# Patient Record
Sex: FEMALE | Race: WHITE | Employment: UNEMPLOYED | URBAN - NONMETROPOLITAN AREA
[De-identification: names, ages, dates, MRNs, and addresses within clinical notes are randomized per-mention and may not be internally consistent; named-entity substitution may affect disease eponyms.]

---

## 2017-02-15 RX ORDER — DIAZEPAM 10 MG/1
10 TABLET ORAL EVERY 8 HOURS PRN
Qty: 90 TABLET | Refills: 0 | OUTPATIENT
Start: 2017-02-15

## 2017-02-23 ENCOUNTER — OFFICE VISIT (OUTPATIENT)
Dept: PSYCHIATRY | Age: 52
End: 2017-02-23
Payer: MEDICAID

## 2017-02-23 VITALS
BODY MASS INDEX: 26.45 KG/M2 | OXYGEN SATURATION: 100 % | SYSTOLIC BLOOD PRESSURE: 113 MMHG | HEIGHT: 63 IN | DIASTOLIC BLOOD PRESSURE: 77 MMHG | WEIGHT: 149.3 LBS | HEART RATE: 70 BPM

## 2017-02-23 DIAGNOSIS — F41.1 GENERALIZED ANXIETY DISORDER: ICD-10-CM

## 2017-02-23 DIAGNOSIS — F43.10 POST TRAUMATIC STRESS DISORDER: ICD-10-CM

## 2017-02-23 DIAGNOSIS — F32.A DEPRESSION, UNSPECIFIED DEPRESSION TYPE: Primary | ICD-10-CM

## 2017-02-23 DIAGNOSIS — F41.0 PANIC DISORDER: ICD-10-CM

## 2017-02-23 PROCEDURE — 99213 OFFICE O/P EST LOW 20 MIN: CPT | Performed by: NURSE PRACTITIONER

## 2017-02-23 RX ORDER — GABAPENTIN 300 MG/1
900 CAPSULE ORAL 3 TIMES DAILY
Qty: 270 CAPSULE | Refills: 2 | Status: SHIPPED | OUTPATIENT
Start: 2017-02-23 | End: 2017-06-21 | Stop reason: SDUPTHER

## 2017-02-23 RX ORDER — DIAZEPAM 10 MG/1
10 TABLET ORAL EVERY 8 HOURS PRN
Qty: 90 TABLET | Refills: 0 | Status: SHIPPED | OUTPATIENT
Start: 2017-02-23 | End: 2017-05-30 | Stop reason: SDUPTHER

## 2017-02-23 RX ORDER — DULOXETIN HYDROCHLORIDE 60 MG/1
60 CAPSULE, DELAYED RELEASE ORAL DAILY
Qty: 30 CAPSULE | Refills: 2 | Status: SHIPPED | OUTPATIENT
Start: 2017-02-23 | End: 2017-06-21 | Stop reason: SDUPTHER

## 2017-02-24 ENCOUNTER — TELEPHONE (OUTPATIENT)
Dept: PSYCHIATRY | Age: 52
End: 2017-02-24

## 2017-04-21 ENCOUNTER — TELEPHONE (OUTPATIENT)
Dept: PSYCHIATRY | Age: 52
End: 2017-04-21

## 2017-04-24 ENCOUNTER — TELEPHONE (OUTPATIENT)
Dept: PSYCHIATRY | Age: 52
End: 2017-04-24

## 2017-05-30 RX ORDER — DIAZEPAM 10 MG/1
10 TABLET ORAL EVERY 8 HOURS PRN
Qty: 90 TABLET | Refills: 0 | Status: SHIPPED | OUTPATIENT
Start: 2017-05-30 | End: 2017-06-21 | Stop reason: SDUPTHER

## 2017-06-21 ENCOUNTER — OFFICE VISIT (OUTPATIENT)
Dept: PSYCHIATRY | Age: 52
End: 2017-06-21
Payer: MEDICAID

## 2017-06-21 VITALS
DIASTOLIC BLOOD PRESSURE: 77 MMHG | BODY MASS INDEX: 25.69 KG/M2 | HEART RATE: 58 BPM | HEIGHT: 63 IN | OXYGEN SATURATION: 99 % | SYSTOLIC BLOOD PRESSURE: 130 MMHG | WEIGHT: 145 LBS

## 2017-06-21 DIAGNOSIS — F41.0 PANIC DISORDER: ICD-10-CM

## 2017-06-21 PROCEDURE — 99214 OFFICE O/P EST MOD 30 MIN: CPT | Performed by: NURSE PRACTITIONER

## 2017-06-21 RX ORDER — TRAZODONE HYDROCHLORIDE 50 MG/1
TABLET ORAL
Qty: 60 TABLET | Refills: 2 | Status: SHIPPED | OUTPATIENT
Start: 2017-06-21 | End: 2017-08-21 | Stop reason: DRUGHIGH

## 2017-06-21 RX ORDER — DULOXETIN HYDROCHLORIDE 60 MG/1
60 CAPSULE, DELAYED RELEASE ORAL DAILY
Qty: 30 CAPSULE | Refills: 2 | Status: SHIPPED | OUTPATIENT
Start: 2017-06-21 | End: 2017-08-21 | Stop reason: SDUPTHER

## 2017-06-21 RX ORDER — GABAPENTIN 300 MG/1
CAPSULE ORAL
Qty: 90 CAPSULE | Refills: 2 | Status: SHIPPED | OUTPATIENT
Start: 2017-06-21 | End: 2017-07-21

## 2017-06-21 RX ORDER — DIAZEPAM 10 MG/1
10 TABLET ORAL EVERY 8 HOURS PRN
Qty: 90 TABLET | Refills: 0 | Status: SHIPPED | OUTPATIENT
Start: 2017-06-21 | End: 2017-08-21 | Stop reason: DRUGHIGH

## 2017-06-23 ENCOUNTER — TELEPHONE (OUTPATIENT)
Dept: PSYCHIATRY | Age: 52
End: 2017-06-23

## 2017-07-21 ENCOUNTER — OFFICE VISIT (OUTPATIENT)
Dept: PSYCHIATRY | Age: 52
End: 2017-07-21
Payer: MEDICAID

## 2017-07-21 VITALS
HEIGHT: 63 IN | DIASTOLIC BLOOD PRESSURE: 66 MMHG | OXYGEN SATURATION: 98 % | BODY MASS INDEX: 27.3 KG/M2 | WEIGHT: 154.1 LBS | SYSTOLIC BLOOD PRESSURE: 98 MMHG | HEART RATE: 68 BPM

## 2017-07-21 DIAGNOSIS — F32.A DEPRESSION, UNSPECIFIED DEPRESSION TYPE: Primary | ICD-10-CM

## 2017-07-21 DIAGNOSIS — F41.1 GENERALIZED ANXIETY DISORDER: ICD-10-CM

## 2017-07-21 PROCEDURE — 90791 PSYCH DIAGNOSTIC EVALUATION: CPT | Performed by: COUNSELOR

## 2017-07-21 PROCEDURE — 99213 OFFICE O/P EST LOW 20 MIN: CPT | Performed by: NURSE PRACTITIONER

## 2017-07-21 PROCEDURE — 99999 PR OFFICE/OUTPT VISIT,PROCEDURE ONLY: CPT | Performed by: COUNSELOR

## 2017-07-21 RX ORDER — PRAZOSIN HYDROCHLORIDE 2 MG/1
2 CAPSULE ORAL NIGHTLY
Qty: 30 CAPSULE | Refills: 1 | Status: SHIPPED | OUTPATIENT
Start: 2017-07-21 | End: 2017-08-21

## 2017-08-14 ENCOUNTER — OFFICE VISIT (OUTPATIENT)
Dept: PSYCHIATRY | Age: 52
End: 2017-08-14
Payer: MEDICAID

## 2017-08-14 VITALS
HEART RATE: 64 BPM | DIASTOLIC BLOOD PRESSURE: 71 MMHG | WEIGHT: 154 LBS | OXYGEN SATURATION: 99 % | BODY MASS INDEX: 27.29 KG/M2 | SYSTOLIC BLOOD PRESSURE: 120 MMHG | HEIGHT: 63 IN

## 2017-08-14 DIAGNOSIS — F32.A DEPRESSION, UNSPECIFIED DEPRESSION TYPE: Primary | ICD-10-CM

## 2017-08-14 DIAGNOSIS — F43.10 POST TRAUMATIC STRESS DISORDER: ICD-10-CM

## 2017-08-14 PROCEDURE — 90834 PSYTX W PT 45 MINUTES: CPT | Performed by: COUNSELOR

## 2017-08-14 PROCEDURE — 99999 PR OFFICE/OUTPT VISIT,PROCEDURE ONLY: CPT | Performed by: COUNSELOR

## 2017-08-14 RX ORDER — GABAPENTIN 300 MG/1
CAPSULE ORAL
Refills: 1 | COMMUNITY
Start: 2017-08-08 | End: 2018-02-16 | Stop reason: SDUPTHER

## 2017-08-21 ENCOUNTER — OFFICE VISIT (OUTPATIENT)
Dept: PSYCHIATRY | Age: 52
End: 2017-08-21
Payer: MEDICAID

## 2017-08-21 VITALS
SYSTOLIC BLOOD PRESSURE: 109 MMHG | HEART RATE: 65 BPM | OXYGEN SATURATION: 99 % | DIASTOLIC BLOOD PRESSURE: 70 MMHG | WEIGHT: 155.2 LBS | HEIGHT: 63 IN | BODY MASS INDEX: 27.5 KG/M2

## 2017-08-21 DIAGNOSIS — F32.A DEPRESSION, UNSPECIFIED DEPRESSION TYPE: Primary | ICD-10-CM

## 2017-08-21 PROCEDURE — 90832 PSYTX W PT 30 MINUTES: CPT | Performed by: NURSE PRACTITIONER

## 2017-08-21 RX ORDER — DULOXETIN HYDROCHLORIDE 60 MG/1
60 CAPSULE, DELAYED RELEASE ORAL DAILY
Qty: 30 CAPSULE | Refills: 2 | Status: SHIPPED | OUTPATIENT
Start: 2017-08-21 | End: 2017-11-16 | Stop reason: SDUPTHER

## 2017-08-21 RX ORDER — DIAZEPAM 5 MG/1
TABLET ORAL
Qty: 45 TABLET | Refills: 0 | Status: SHIPPED | OUTPATIENT
Start: 2017-08-21 | End: 2017-10-09 | Stop reason: SDUPTHER

## 2017-08-21 RX ORDER — TRAZODONE HYDROCHLORIDE 100 MG/1
TABLET ORAL
Qty: 45 TABLET | Refills: 2 | Status: SHIPPED | OUTPATIENT
Start: 2017-08-21 | End: 2017-11-16 | Stop reason: DRUGHIGH

## 2017-08-29 ENCOUNTER — TELEPHONE (OUTPATIENT)
Dept: PSYCHIATRY | Age: 52
End: 2017-08-29

## 2017-08-29 ENCOUNTER — OFFICE VISIT (OUTPATIENT)
Dept: PSYCHIATRY | Age: 52
End: 2017-08-29
Payer: MEDICAID

## 2017-08-29 VITALS
OXYGEN SATURATION: 95 % | SYSTOLIC BLOOD PRESSURE: 112 MMHG | HEART RATE: 67 BPM | BODY MASS INDEX: 26.05 KG/M2 | HEIGHT: 63 IN | WEIGHT: 147 LBS | DIASTOLIC BLOOD PRESSURE: 73 MMHG

## 2017-08-29 DIAGNOSIS — F41.9 ANXIETY: ICD-10-CM

## 2017-08-29 DIAGNOSIS — F32.A DEPRESSION, UNSPECIFIED DEPRESSION TYPE: Primary | ICD-10-CM

## 2017-08-29 DIAGNOSIS — F43.10 POST TRAUMATIC STRESS DISORDER: ICD-10-CM

## 2017-08-29 PROCEDURE — 99999 PR OFFICE/OUTPT VISIT,PROCEDURE ONLY: CPT | Performed by: COUNSELOR

## 2017-08-29 PROCEDURE — 90832 PSYTX W PT 30 MINUTES: CPT | Performed by: COUNSELOR

## 2017-10-10 RX ORDER — DIAZEPAM 5 MG/1
TABLET ORAL
Qty: 45 TABLET | Refills: 0 | Status: SHIPPED | OUTPATIENT
Start: 2017-10-10 | End: 2017-11-16 | Stop reason: DRUGHIGH

## 2017-10-10 NOTE — TELEPHONE ENCOUNTER
Called in Valium 5 mg tablet to [facility] left on prescriber vm  Per Dr. Elen Carlos  Per Katie Pastrana
found.  Tenzin Rinaldi report reviewed per  req. Plan: Start Latuda 10 mg qhs, increase Trazodone to 150 mg qhs, Cymbalta 60 mg daily, Valium 5 mg q 8 hours plus 1/2 tab for breakthrough anxiety  1. The risks, benefits, side effects, indications, contraindications, and adverse effects of the medications have been discussed. yes   2. The pt has verbalized understanding and has capacity to give informed consent. 3. The Tenzin Rinaldi report has been reviewed according to [facility] regulations. 4. Supportive therapy offered. 5. Follow up: No Follow-up on file. 6. The patient has been advised to call with any problems. 7. Controlled substance Treatment Plan: Pt is continuing to try to taper her Valium. 8. The above listed medications have been continued, modifications in meds and other orders/labs as follows: No orders of the defined types were placed in this encounter. No orders of the defined types were placed in this encounter.       9. Additional comments:

## 2017-11-16 ENCOUNTER — TELEPHONE (OUTPATIENT)
Dept: PSYCHIATRY | Age: 52
End: 2017-11-16

## 2017-11-16 ENCOUNTER — OFFICE VISIT (OUTPATIENT)
Dept: PSYCHIATRY | Age: 52
End: 2017-11-16
Payer: MEDICAID

## 2017-11-16 VITALS
HEART RATE: 63 BPM | DIASTOLIC BLOOD PRESSURE: 72 MMHG | OXYGEN SATURATION: 96 % | SYSTOLIC BLOOD PRESSURE: 106 MMHG | BODY MASS INDEX: 27.64 KG/M2 | HEIGHT: 63 IN | WEIGHT: 156 LBS

## 2017-11-16 DIAGNOSIS — F32.A DEPRESSION, UNSPECIFIED DEPRESSION TYPE: Primary | ICD-10-CM

## 2017-11-16 PROCEDURE — 99999 PR OFFICE/OUTPT VISIT,PROCEDURE ONLY: CPT | Performed by: NURSE PRACTITIONER

## 2017-11-16 PROCEDURE — 90832 PSYTX W PT 30 MINUTES: CPT | Performed by: NURSE PRACTITIONER

## 2017-11-16 RX ORDER — DULOXETIN HYDROCHLORIDE 60 MG/1
60 CAPSULE, DELAYED RELEASE ORAL DAILY
Qty: 30 CAPSULE | Refills: 2 | Status: SHIPPED | OUTPATIENT
Start: 2017-11-16 | End: 2018-02-16 | Stop reason: SDUPTHER

## 2017-11-16 RX ORDER — DIAZEPAM 5 MG/1
TABLET ORAL
Qty: 60 TABLET | Refills: 0 | Status: SHIPPED | OUTPATIENT
Start: 2017-11-16 | End: 2018-02-16 | Stop reason: SDUPTHER

## 2017-11-16 RX ORDER — TRAZODONE HYDROCHLORIDE 100 MG/1
100 TABLET ORAL NIGHTLY
Qty: 30 TABLET | Refills: 2 | Status: SHIPPED | OUTPATIENT
Start: 2017-11-16 | End: 2018-02-16 | Stop reason: DRUGHIGH

## 2017-11-16 NOTE — PROGRESS NOTES
11/16/2017 1:05 PM   Progress Note        Jaleesa Pacheco 1965  Psychotherapy Time Spent: 19 min      Psychotherapy Topics: health, medications    Chief Complaint   Patient presents with    Follow-up    Depression     Subjective: The patient is a 46 y.o.   female who is here for a routine office visit. Last seen by this writer on 8/21/17. Dx: Depression, Unspecified type, Anxiety,  PTSD    We discussed complaints made at her last appt in therapy on 8/291/7, I.e medication changes. Devan Alexandre explained to pt to talk about this in appts which she had not. Pt dismisses complaints. \"Oh, I was angry. \" Pt states she was upset because she only gets 30 minutes for therapy. She has not returned to therapy. She is now ready for surgery on her back. She in unsure of the date. Very strong chance the surgery will help her pain in her lower back. Still plans to move into a \"tiny house. \" Hopefully by the end of the year. Depression: Ok  Anxiety: Is taking one 5 mg Valium qhs and occasionally one in the daytime if she gets really upset. \"I'm really proud of myself. I've come a long way with that! \" We discussed a temporary increase at this time due to her upcoming move and surgery. Sleep: Taking 100 mg Trazodone qhs. Working well. 150 mg knocks her out too much. She was urinating in the bed because she couldn't get to the bathroom in time. Appetite: Pt has gained 9 lbs since last appt. Substance Use:  Pain: \"Pretty bad. \"    Patient reports side effects as follows: none. No evidence of EPS, no cogwheeling or abnormal motor movements. Absent  suicidal ideation. Reports compliance with medications as see above. Review of Systems - See above      Current Meds:    Prior to Admission medications    Medication Sig Start Date End Date Taking?  Authorizing Provider   diazepam (VALIUM) 5 MG tablet Take one tablet bid prn anxiety plus 1/2 tablet prn breakthrough anxiety 10/10/17   ALESSANDRA Dooley lurasidone (LATUDA) 20 MG TABS tablet Take 1 tablet by mouth daily 8/30/17   ALESSANDRA Robins   DULoxetine (CYMBALTA) 60 MG extended release capsule Take 1 capsule by mouth daily 8/21/17   ALESSANDRA Robins   traZODone (DESYREL) 100 MG tablet Take 1 1/2 tablets qhs 8/21/17   ALESSANDRA Robins   gabapentin (NEURONTIN) 300 MG capsule TAKE 3 CAPSULES BY MOUTH EVERY 8 HOURS 8/8/17   Historical Provider, MD   carisoprodol (SOMA) 250 MG tablet Take 350 mg by mouth three times daily    Historical Provider, MD   oxyCODONE-acetaminophen (PERCOCET)  MG per tablet every 6 hours as needed  . 1/27/16   Historical Provider, MD   propranolol (INDERAL LA) 80 MG CR capsule daily  2/19/16   Historical Provider, MD   CELEBREX 200 MG capsule 200 mg daily. 9/29/14   Historical Provider, MD   cloNIDine (CATAPRES) 0.1 MG tablet Take 0.1 mg by mouth 2 times daily as needed. Historical Provider, MD   promethazine (PHENERGAN) 25 MG tablet Take 25 mg by mouth daily. Historical Provider, MD           MSE:  Patient is  A & O x3. Appearance: well appearing appropriately dressed for season and age. Cognition:  Recent memory intact , remote memory intact , good fund of knowledge, average  intelligence level. Speech:  normal  Language: Naming: intact; Word Finding: intact  Conversation no evidence of delusions, paranoid, persecutory, grandiose, ideas of reference, thought broadcasting, thought insertion and delusions of control  Behavior:  Cooperative and Good eye contact  Mood: euthymic but brighter than last appt  Affect: congruent with mood  Thought Content: no evidence of overt psychosis, delusional thought or suicidal /homicidal ideation or plan  Thought Process: linear, goal directed and coherent  Judgement Insight:  normal and appropriate  Gait and Station: Slow to rise, stiff gait   Musculoskeletal: WNL      Assesment: See above   Pavan Swanson report reviewed per HB req.        Plan:  D/C Latuda, continue Cymbalta,

## 2017-12-01 ENCOUNTER — TRANSCRIBE ORDERS (OUTPATIENT)
Dept: ADMINISTRATIVE | Facility: HOSPITAL | Age: 52
End: 2017-12-01

## 2017-12-01 DIAGNOSIS — M41.80 DEXTROSCOLIOSIS: Primary | ICD-10-CM

## 2017-12-01 DIAGNOSIS — M54.5 LOW BACK PAIN, UNSPECIFIED BACK PAIN LATERALITY, UNSPECIFIED CHRONICITY, WITH SCIATICA PRESENCE UNSPECIFIED: ICD-10-CM

## 2017-12-01 DIAGNOSIS — M54.9 DORSALGIA: ICD-10-CM

## 2017-12-01 DIAGNOSIS — N39.44 NOCTURNAL AND DIURNAL ENURESIS: ICD-10-CM

## 2017-12-08 ENCOUNTER — HOSPITAL ENCOUNTER (OUTPATIENT)
Dept: MRI IMAGING | Facility: HOSPITAL | Age: 52
Discharge: HOME | End: 2017-12-08
Admitting: NEUROLOGICAL SURGERY

## 2017-12-08 ENCOUNTER — HOSPITAL ENCOUNTER (OUTPATIENT)
Dept: MRI IMAGING | Facility: HOSPITAL | Age: 52
Discharge: HOME | End: 2017-12-08

## 2017-12-08 DIAGNOSIS — M41.80 DEXTROSCOLIOSIS: ICD-10-CM

## 2017-12-08 DIAGNOSIS — M54.9 DORSALGIA: ICD-10-CM

## 2017-12-08 DIAGNOSIS — M54.5 LOW BACK PAIN, UNSPECIFIED BACK PAIN LATERALITY, UNSPECIFIED CHRONICITY, WITH SCIATICA PRESENCE UNSPECIFIED: ICD-10-CM

## 2017-12-08 PROCEDURE — 72148 MRI LUMBAR SPINE W/O DYE: CPT

## 2017-12-08 PROCEDURE — 72146 MRI CHEST SPINE W/O DYE: CPT

## 2017-12-11 ENCOUNTER — APPOINTMENT (OUTPATIENT)
Dept: MRI IMAGING | Facility: HOSPITAL | Age: 52
End: 2017-12-11

## 2017-12-12 ENCOUNTER — HOSPITAL ENCOUNTER (OUTPATIENT)
Dept: MRI IMAGING | Facility: HOSPITAL | Age: 52
Discharge: HOME | End: 2017-12-12
Admitting: NEUROLOGICAL SURGERY

## 2017-12-12 DIAGNOSIS — M41.80 DEXTROSCOLIOSIS: ICD-10-CM

## 2017-12-12 DIAGNOSIS — N39.44 NOCTURNAL AND DIURNAL ENURESIS: ICD-10-CM

## 2017-12-12 LAB — CREAT BLDA-MCNC: 1 MG/DL (ref 0.6–1.3)

## 2017-12-12 PROCEDURE — 72156 MRI NECK SPINE W/O & W/DYE: CPT

## 2017-12-12 PROCEDURE — A9577 INJ MULTIHANCE: HCPCS | Performed by: NEUROLOGICAL SURGERY

## 2017-12-12 PROCEDURE — 0 GADOBENATE DIMEGLUMINE 529 MG/ML SOLUTION: Performed by: NEUROLOGICAL SURGERY

## 2017-12-12 PROCEDURE — 82565 ASSAY OF CREATININE: CPT

## 2017-12-12 RX ADMIN — GADOBENATE DIMEGLUMINE 20 ML: 529 INJECTION, SOLUTION INTRAVENOUS at 13:15

## 2018-01-15 ENCOUNTER — TRANSCRIBE ORDERS (OUTPATIENT)
Dept: ADMINISTRATIVE | Facility: HOSPITAL | Age: 53
End: 2018-01-15

## 2018-01-15 DIAGNOSIS — E04.9 NONTOXIC GOITER: Primary | ICD-10-CM

## 2018-01-17 ENCOUNTER — APPOINTMENT (OUTPATIENT)
Dept: ULTRASOUND IMAGING | Facility: HOSPITAL | Age: 53
End: 2018-01-17
Attending: INTERNAL MEDICINE

## 2018-01-23 ENCOUNTER — HOSPITAL ENCOUNTER (OUTPATIENT)
Dept: ULTRASOUND IMAGING | Facility: HOSPITAL | Age: 53
Discharge: HOME | End: 2018-01-23
Attending: INTERNAL MEDICINE | Admitting: INTERNAL MEDICINE

## 2018-01-23 DIAGNOSIS — E04.9 NONTOXIC GOITER: ICD-10-CM

## 2018-01-23 PROCEDURE — 76536 US EXAM OF HEAD AND NECK: CPT

## 2018-02-16 ENCOUNTER — OFFICE VISIT (OUTPATIENT)
Dept: PSYCHIATRY | Age: 53
End: 2018-02-16
Payer: MEDICAID

## 2018-02-16 ENCOUNTER — TELEPHONE (OUTPATIENT)
Dept: PSYCHIATRY | Age: 53
End: 2018-02-16

## 2018-02-16 DIAGNOSIS — F32.A DEPRESSION, UNSPECIFIED DEPRESSION TYPE: Primary | ICD-10-CM

## 2018-02-16 PROCEDURE — 99999 PR OFFICE/OUTPT VISIT,PROCEDURE ONLY: CPT | Performed by: NURSE PRACTITIONER

## 2018-02-16 PROCEDURE — 90832 PSYTX W PT 30 MINUTES: CPT | Performed by: NURSE PRACTITIONER

## 2018-02-16 RX ORDER — DIAZEPAM 5 MG/1
TABLET ORAL
Qty: 60 TABLET | Refills: 0 | Status: SHIPPED | OUTPATIENT
Start: 2018-02-16 | End: 2018-05-04 | Stop reason: SDUPTHER

## 2018-02-16 RX ORDER — TRAZODONE HYDROCHLORIDE 100 MG/1
TABLET ORAL
Qty: 60 TABLET | Refills: 2 | Status: SHIPPED | OUTPATIENT
Start: 2018-02-16 | End: 2018-05-26 | Stop reason: SDUPTHER

## 2018-02-16 RX ORDER — DULOXETIN HYDROCHLORIDE 60 MG/1
60 CAPSULE, DELAYED RELEASE ORAL DAILY
Qty: 30 CAPSULE | Refills: 2 | Status: SHIPPED | OUTPATIENT
Start: 2018-02-16 | End: 2018-05-26 | Stop reason: SDUPTHER

## 2018-02-16 RX ORDER — GABAPENTIN 300 MG/1
CAPSULE ORAL
Qty: 90 CAPSULE | Refills: 0 | Status: SHIPPED | OUTPATIENT
Start: 2018-02-16 | End: 2020-08-13 | Stop reason: ALTCHOICE

## 2018-02-16 NOTE — PROGRESS NOTES
capsule 200 mg daily. 9/29/14   Historical Provider, MD   cloNIDine (CATAPRES) 0.1 MG tablet Take 0.1 mg by mouth 2 times daily as needed. Historical Provider, MD   promethazine (PHENERGAN) 25 MG tablet Take 25 mg by mouth daily. Historical Provider, MD     MSE:  Patient is  A & O x3. Appearance:  well-appearing appropriately dressed for season and age. Cognition:  Recent memory intact , remote memory intact , good fund of knowledge, average  intelligence level. Speech:  normal  Language: Naming: intact; Word Finding: intact  Conversation no evidence of delusions  Behavior:  Cooperative and Good eye contact  Mood: euthymic  Affect: congruent with mood  Thought Content: no evidence of overt psychosis, delusional thought or suicidal /homicidal ideation or plan  Thought Process: linear, goal directed and coherent  Judgement Insight:  normal and appropriate  Gait and Station: slow to rise and move   Musculoskeletal: WNL    Assesment: See above   Adrian Hammonds report reviewed per  req. Plan: Increase Trazodone to 100 mg one to two tablets qhs prn insomnia, continue Valium 5 mg one tablet daily prn anxiety and one tablet qhs, Cymbalta 60 mg qam, Neurontin 300 mg take 3 capsules q 8 hours  1. The risks, benefits, side effects, indications, contraindications, and adverse effects of the medications have been discussed. yes   2. The pt has verbalized understanding and has capacity to give informed consent. 3. The Adrian Hammonds report has been reviewed according to [facility] regulations. 4. Supportive therapy offered. 5. Follow up: Return in three months  6. The patient has been advised to call with any problems.   7. Controlled substance Treatment Plan: Maintenance at this time

## 2018-05-08 RX ORDER — DIAZEPAM 5 MG/1
TABLET ORAL
Qty: 60 TABLET | Refills: 0 | Status: SHIPPED | OUTPATIENT
Start: 2018-05-08 | End: 2018-06-05 | Stop reason: SDUPTHER

## 2018-05-16 ENCOUNTER — TELEPHONE (OUTPATIENT)
Dept: PSYCHIATRY | Age: 53
End: 2018-05-16

## 2018-05-29 RX ORDER — DULOXETIN HYDROCHLORIDE 60 MG/1
60 CAPSULE, DELAYED RELEASE ORAL DAILY
Qty: 30 CAPSULE | Refills: 2 | Status: SHIPPED | OUTPATIENT
Start: 2018-05-29 | End: 2018-08-22 | Stop reason: SDUPTHER

## 2018-05-29 RX ORDER — TRAZODONE HYDROCHLORIDE 100 MG/1
TABLET ORAL
Qty: 60 TABLET | Refills: 2 | Status: SHIPPED | OUTPATIENT
Start: 2018-05-29 | End: 2018-08-22 | Stop reason: SDUPTHER

## 2018-06-05 DIAGNOSIS — F33.9 EPISODE OF RECURRENT MAJOR DEPRESSIVE DISORDER, UNSPECIFIED DEPRESSION EPISODE SEVERITY (HCC): Primary | ICD-10-CM

## 2018-06-06 RX ORDER — DIAZEPAM 5 MG/1
TABLET ORAL
Qty: 60 TABLET | Refills: 0 | Status: SHIPPED | OUTPATIENT
Start: 2018-06-06 | End: 2018-06-26 | Stop reason: SDUPTHER

## 2018-06-26 ENCOUNTER — OFFICE VISIT (OUTPATIENT)
Dept: PSYCHIATRY | Age: 53
End: 2018-06-26
Payer: MEDICAID

## 2018-06-26 VITALS
HEART RATE: 65 BPM | HEIGHT: 62 IN | DIASTOLIC BLOOD PRESSURE: 74 MMHG | OXYGEN SATURATION: 98 % | BODY MASS INDEX: 27.2 KG/M2 | WEIGHT: 147.8 LBS | SYSTOLIC BLOOD PRESSURE: 118 MMHG

## 2018-06-26 DIAGNOSIS — F43.10 PTSD (POST-TRAUMATIC STRESS DISORDER): Primary | ICD-10-CM

## 2018-06-26 DIAGNOSIS — F41.1 GAD (GENERALIZED ANXIETY DISORDER): ICD-10-CM

## 2018-06-26 DIAGNOSIS — F33.9 EPISODE OF RECURRENT MAJOR DEPRESSIVE DISORDER, UNSPECIFIED DEPRESSION EPISODE SEVERITY (HCC): ICD-10-CM

## 2018-06-26 DIAGNOSIS — F99 INSOMNIA DUE TO OTHER MENTAL DISORDER: ICD-10-CM

## 2018-06-26 DIAGNOSIS — F51.05 INSOMNIA DUE TO OTHER MENTAL DISORDER: ICD-10-CM

## 2018-06-26 PROCEDURE — 99215 OFFICE O/P EST HI 40 MIN: CPT | Performed by: NURSE PRACTITIONER

## 2018-06-26 RX ORDER — DIAZEPAM 5 MG/1
TABLET ORAL
Qty: 30 TABLET | Refills: 0 | Status: SHIPPED | OUTPATIENT
Start: 2018-06-26 | End: 2018-07-26 | Stop reason: SDUPTHER

## 2018-07-26 ENCOUNTER — TELEPHONE (OUTPATIENT)
Dept: PSYCHIATRY | Age: 53
End: 2018-07-26

## 2018-07-26 DIAGNOSIS — F33.9 EPISODE OF RECURRENT MAJOR DEPRESSIVE DISORDER, UNSPECIFIED DEPRESSION EPISODE SEVERITY (HCC): ICD-10-CM

## 2018-07-26 RX ORDER — DIAZEPAM 5 MG/1
TABLET ORAL
Qty: 60 TABLET | Refills: 0 | OUTPATIENT
Start: 2018-07-30 | End: 2018-08-30 | Stop reason: SDUPTHER

## 2018-08-22 RX ORDER — DULOXETIN HYDROCHLORIDE 60 MG/1
CAPSULE, DELAYED RELEASE ORAL
Qty: 30 CAPSULE | Refills: 2 | Status: SHIPPED | OUTPATIENT
Start: 2018-08-22 | End: 2018-11-19 | Stop reason: SDUPTHER

## 2018-08-22 RX ORDER — TRAZODONE HYDROCHLORIDE 100 MG/1
TABLET ORAL
Qty: 60 TABLET | Refills: 2 | Status: SHIPPED | OUTPATIENT
Start: 2018-08-22 | End: 2018-11-27 | Stop reason: SDUPTHER

## 2018-08-22 NOTE — TELEPHONE ENCOUNTER
Pt is needing a refill on the following; Pt was last seen on 06/26/18 and has a follow up on 09/10/18                                                         Progress Note    8/22/2018          Octaviano Cheng 1965     Time in: 6422   Time out: 56      Chief Complaint   Patient presents with    Medication Refill       History obtained via chart review Patient    Subjective:  Patient is a 46 y.o. Year old female who presents needing medication management for the problems of mood swings. States she will become angry due to the situation she is in. She is not currently seeing a therapist for PTSD. She agrees to get a therapist to work on her emotional distress, fear, and anxiety disorder. Patient Active Problem List    Diagnosis Date Noted    Post traumatic stress disorder 05/18/2016    Depression 10/09/2014    Panic disorder 10/09/2014    Generalized anxiety disorder 10/09/2014    Chronic constipation 08/04/2014    Melena 08/04/2014    Bilateral lower abdominal pain 08/04/2014    Nausea 08/04/2014    Dysphagia 08/04/2014    NSAID long-term use 08/04/2014   . Allen Clark is not reporting any side effects from prescribed medications and is  medication complaint. Today Allen Clark states, \"My S.O. caused a lot of damage to my back for past 3 and 1/2 years. \" States she has had back pain for most of her life, but has been more severe with 3.5 years. Has also had neck surgery. Psychosocial Stressors:  At risk for anxiety/stress issues, At risk for depression and At risk for social isolation    Review of Systems - Review of Systems - Negative except listed  History obtained from chart review and the patient  General ROS: positive for  - fatigue, malaise and sleep disturbance  Psychological ROS: positive for - anxiety, depression and sleep disturbances  Musculoskeletal ROS: positive for - gait disturbance, joint pain, joint stiffness, muscle pain and muscular weakness    Objective:     Family History or two at HS PRN sleep-has current refills  Cymbalta 60 mg at HS - has current refills    She is currently taking polypharmacy and appears sedated in the session.    Requested Prescriptions     Pending Prescriptions Disp Refills    DULoxetine (CYMBALTA) 60 MG extended release capsule [Pharmacy Med Name: DULOXETINE HCL DR 60 MG CAP] 30 capsule 2     Sig: TAKE 1 CAPSULE BY MOUTH EVERY DAY    traZODone (DESYREL) 100 MG tablet [Pharmacy Med Name: TRAZODONE 100 MG TABLET] 60 tablet 2     Sig: TAKE ONE TO TWO TABLETS BY MOUTH AT BEDTIME AS NEEDED FOR INSOMNIA

## 2018-08-30 DIAGNOSIS — F33.9 EPISODE OF RECURRENT MAJOR DEPRESSIVE DISORDER, UNSPECIFIED DEPRESSION EPISODE SEVERITY (HCC): ICD-10-CM

## 2018-08-31 RX ORDER — DIAZEPAM 5 MG/1
TABLET ORAL
Qty: 30 TABLET | Refills: 0 | Status: SHIPPED | OUTPATIENT
Start: 2018-08-31 | End: 2018-09-18

## 2018-09-06 ENCOUNTER — TELEPHONE (OUTPATIENT)
Dept: PSYCHIATRY | Age: 53
End: 2018-09-06

## 2018-09-21 ENCOUNTER — OFFICE VISIT (OUTPATIENT)
Dept: PSYCHIATRY | Age: 53
End: 2018-09-21
Payer: MEDICAID

## 2018-09-21 VITALS
OXYGEN SATURATION: 99 % | DIASTOLIC BLOOD PRESSURE: 83 MMHG | BODY MASS INDEX: 25.49 KG/M2 | WEIGHT: 135 LBS | HEIGHT: 61 IN | SYSTOLIC BLOOD PRESSURE: 129 MMHG | HEART RATE: 66 BPM

## 2018-09-21 DIAGNOSIS — F41.1 GAD (GENERALIZED ANXIETY DISORDER): ICD-10-CM

## 2018-09-21 DIAGNOSIS — F43.10 PTSD (POST-TRAUMATIC STRESS DISORDER): Primary | ICD-10-CM

## 2018-09-21 DIAGNOSIS — F33.9 EPISODE OF RECURRENT MAJOR DEPRESSIVE DISORDER, UNSPECIFIED DEPRESSION EPISODE SEVERITY (HCC): ICD-10-CM

## 2018-09-21 PROCEDURE — 99214 OFFICE O/P EST MOD 30 MIN: CPT | Performed by: NURSE PRACTITIONER

## 2018-09-21 RX ORDER — DIAZEPAM 5 MG/1
TABLET ORAL
Qty: 60 TABLET | Refills: 0 | Status: SHIPPED | OUTPATIENT
Start: 2018-09-21 | End: 2018-10-29 | Stop reason: SDUPTHER

## 2018-09-27 ENCOUNTER — TELEPHONE (OUTPATIENT)
Dept: PSYCHIATRY | Age: 53
End: 2018-09-27

## 2018-10-29 DIAGNOSIS — F33.9 EPISODE OF RECURRENT MAJOR DEPRESSIVE DISORDER, UNSPECIFIED DEPRESSION EPISODE SEVERITY (HCC): ICD-10-CM

## 2018-10-30 RX ORDER — DIAZEPAM 5 MG/1
TABLET ORAL
Qty: 60 TABLET | Refills: 0 | Status: SHIPPED | OUTPATIENT
Start: 2018-10-30 | End: 2018-11-19 | Stop reason: SDUPTHER

## 2018-10-30 NOTE — TELEPHONE ENCOUNTER
Called into pharmacy. Left on vm.
for - anxiety and depression  Musculoskeletal ROS: positive for - pain in bilateral back    Objective:     Family History   Problem Relation Age of Onset    Colon Polyps Father     Lung Cancer Father     Heart Disease Father         stents    Depression Father     Colon Polyps Paternal Grandmother     Colon Cancer Paternal Grandmother     Depression Sister     Esophageal Cancer Neg Hx     Liver Cancer Neg Hx     Stomach Cancer Neg Hx      Social History     Social History    Marital status:      Spouse name: N/A    Number of children: N/A    Years of education: N/A     Social History Main Topics    Smoking status: Former Smoker     Packs/day: 1.50     Quit date: 11/4/2016    Smokeless tobacco: Never Used      Comment: pt is using ecigs    Alcohol use No    Drug use: No    Sexual activity: Not on file     Other Topics Concern    Not on file     Social History Narrative    No narrative on file         Labs: reviewed No results for input(s): WBC, HGB, HCT, MCV, PLT in the last 720 hours. No results found for: LABA1C  No results found for: EAG   No results found for: NA, K, CL, CO2, BUN, CREATININE, GLUCOSE, CALCIUM, PROT, LABALBU, BILITOT, ALKPHOS, AST, ALT, LABGLOM, GFRAA, AGRATIO, GLOB      /83 (Site: Right Upper Arm, Position: Sitting, Cuff Size: Medium Adult)   Pulse 66   Ht 5' 1\" (1.549 m)   Wt 135 lb (61.2 kg)   SpO2 99%   BMI 25.51 kg/m²       Current Meds:    Current Outpatient Prescriptions   Medication Sig Dispense Refill    DULoxetine (CYMBALTA) 60 MG extended release capsule TAKE 1 CAPSULE BY MOUTH EVERY DAY 30 capsule 2    traZODone (DESYREL) 100 MG tablet TAKE ONE TO TWO TABLETS BY MOUTH AT BEDTIME AS NEEDED FOR INSOMNIA 60 tablet 2    gabapentin (NEURONTIN) 300 MG capsule TAKE 3 CAPSULES BY MOUTH EVERY 8 HOURS.  90 capsule 0    carisoprodol (SOMA) 250 MG tablet Take 350 mg by mouth three times daily      oxyCODONE-acetaminophen (PERCOCET)  MG per tablet

## 2018-11-19 DIAGNOSIS — F33.9 EPISODE OF RECURRENT MAJOR DEPRESSIVE DISORDER, UNSPECIFIED DEPRESSION EPISODE SEVERITY (HCC): ICD-10-CM

## 2018-11-19 RX ORDER — DULOXETIN HYDROCHLORIDE 60 MG/1
CAPSULE, DELAYED RELEASE ORAL
Qty: 30 CAPSULE | Refills: 2 | Status: SHIPPED | OUTPATIENT
Start: 2018-11-19 | End: 2019-02-14 | Stop reason: SDUPTHER

## 2018-11-19 RX ORDER — DIAZEPAM 5 MG/1
TABLET ORAL
Qty: 60 TABLET | Refills: 0 | Status: SHIPPED | OUTPATIENT
Start: 2018-11-19 | End: 2018-12-07

## 2018-11-19 NOTE — TELEPHONE ENCOUNTER
Shericeadelia Aguilar is out , please approve  Pt is requesting a refill of the following Rx. Pt was last seen on 9-21-18and has a follow up on 12-14-18. Requested Prescriptions     Pending Prescriptions Disp Refills    DULoxetine (CYMBALTA) 60 MG extended release capsule 30 capsule 2    diazepam (VALIUM) 5 MG tablet 60 tablet 0     Sig: Take one tablet BID. Moris Love Progress Note    11/19/2018          Aleksandar Owen 1965     Time in: 200   Time out:1145       Chief Complaint   Patient presents with    Medication Refill       History obtained via chart review Patient    Subjective:  Patient is a 48 y.o. Year old female who presents needing medication management for the problems of   Patient Active Problem List    Diagnosis Date Noted    Post traumatic stress disorder 05/18/2016    Depression 10/09/2014    Generalized anxiety disorder 10/09/2014    Chronic constipation 08/04/2014    Melena 08/04/2014    Bilateral lower abdominal pain 08/04/2014    Nausea 08/04/2014    Dysphagia 08/04/2014    NSAID long-term use 08/04/2014   . Sky Ashford is not reporting any side effects from prescribed medications and is  medication complaint. Today Sky Ashford states, \"I have PTSD and I need Valium 5 mg twice a day. \" She has not reengaged with a therapist for the past year. She has been instructed to do this to help long term with her PTSD and anxiety. She agrees today to contact a therapist and make a PTSD, trauma appointment. She has been taking Valium for a number of years and is not ready to decrease her dose to 30 tablets a month and with therapy she would be a candidate for decreasing this dose further. Will approve Valium 5 mg BID for her while she reengages with trauma therapy. Psychosocial Stressors: Has been living with mother. Sold her home. \"My back is to bad that I can't do anything. I just lay on the couch or in the bed. \"    Review of Systems - Review of Systems - Negative except listed  History obtained from the patient  General ROS: positive for  - sleep disturbance  Psychological ROS: positive for - anxiety and depression  Musculoskeletal ROS: positive for - pain in bilateral back    Objective:     Family History   Problem Relation Age of Onset    Colon Polyps Father     Lung Cancer Father     Heart Disease Father         stents    Depression Father     Colon Polyps Paternal Grandmother     Colon Cancer Paternal Grandmother     Depression Sister     Esophageal Cancer Neg Hx     Liver Cancer Neg Hx     Stomach Cancer Neg Hx      Social History     Social History    Marital status:      Spouse name: N/A    Number of children: N/A    Years of education: N/A     Social History Main Topics    Smoking status: Former Smoker     Packs/day: 1.50     Quit date: 11/4/2016    Smokeless tobacco: Never Used      Comment: pt is using ecigs    Alcohol use No    Drug use: No    Sexual activity: Not on file     Other Topics Concern    Not on file     Social History Narrative    No narrative on file         Labs: reviewed No results for input(s): WBC, HGB, HCT, MCV, PLT in the last 720 hours. No results found for: LABA1C  No results found for: EAG   No results found for: NA, K, CL, CO2, BUN, CREATININE, GLUCOSE, CALCIUM, PROT, LABALBU, BILITOT, ALKPHOS, AST, ALT, LABGLOM, GFRAA, AGRATIO, GLOB      /83 (Site: Right Upper Arm, Position: Sitting, Cuff Size: Medium Adult)   Pulse 66   Ht 5' 1\" (1.549 m)   Wt 135 lb (61.2 kg)   SpO2 99%   BMI 25.51 kg/m²       Current Meds:    Current Outpatient Prescriptions   Medication Sig Dispense Refill    DULoxetine (CYMBALTA) 60 MG extended release capsule TAKE 1 CAPSULE BY MOUTH EVERY DAY 30 capsule 2    traZODone (DESYREL) 100 MG tablet TAKE ONE TO TWO TABLETS BY MOUTH AT BEDTIME AS NEEDED FOR INSOMNIA 60 tablet 2    gabapentin (NEURONTIN) 300 MG capsule TAKE 3 CAPSULES BY MOUTH EVERY 8 HOURS.  80 capsule 0    carisoprodol (SOMA) 250 MG tablet Take 350 mg by mouth three times daily      oxyCODONE-acetaminophen (PERCOCET)  MG per tablet every 3-4 hours as needed. Moris Fear 0    propranolol (INDERAL LA) 80 MG CR capsule daily   6    CELEBREX 200 MG capsule 200 mg daily.  cloNIDine (CATAPRES) 0.1 MG tablet Take 0.1 mg by mouth 2 times daily as needed.  promethazine (PHENERGAN) 25 MG tablet Take 25 mg by mouth daily. No current facility-administered medications for this visit. MSE:  General Appearance: Appears healthy. Alert; in no acute distress. Pleasant. Mood & Affect: sad  and anxious    Orientation:  person, place, time/date, situation, day of week and month of year    Speech: Monotonous and Soft    Thought Process: within normal limits    Thought Content: normal    Hallucinations: Absent    Delusions: Absent    Suicidal: denies suicidal ideation    Homicidal: Negative for homicidal ideation       Memory: recent:  fair and remote:  fair    Estimated Intelligence: average    Attention/Concentration: fair    Judgment (everyday activities & social situations): limited  Insight (concerning psychiatric condition): limited    Gait and station: shuffled      Assessment:   Patient Active Problem List    Diagnosis Date Noted    Post traumatic stress disorder 05/18/2016    Depression 10/09/2014    Generalized anxiety disorder 10/09/2014    Chronic constipation 08/04/2014    Melena 08/04/2014    Bilateral lower abdominal pain 08/04/2014    Nausea 08/04/2014    Dysphagia 08/04/2014    NSAID long-term use 08/04/2014         Plan:  1. The risks, benefits, side effects, indications, contraindications, and adverse effects of the medications have been discussed. 2. [facility]  has verbalized understanding and has capacity to give informed consent. 3. The Smith Cordero report has been reviewed according to [facility] regulations. 4. Would benefit from individual therapy  5. Follow up in 2 months   6.

## 2018-11-27 DIAGNOSIS — G47.00 INSOMNIA, UNSPECIFIED TYPE: Primary | ICD-10-CM

## 2018-11-27 RX ORDER — TRAZODONE HYDROCHLORIDE 100 MG/1
TABLET ORAL
Qty: 60 TABLET | Refills: 2 | Status: SHIPPED | OUTPATIENT
Start: 2018-11-27 | End: 2019-02-14 | Stop reason: SDUPTHER

## 2018-11-27 NOTE — TELEPHONE ENCOUNTER
tablet Take 350 mg by mouth three times daily      oxyCODONE-acetaminophen (PERCOCET)  MG per tablet every 3-4 hours as needed. Nessa Kubas 0    propranolol (INDERAL LA) 80 MG CR capsule daily   6    CELEBREX 200 MG capsule 200 mg daily.  cloNIDine (CATAPRES) 0.1 MG tablet Take 0.1 mg by mouth 2 times daily as needed.  promethazine (PHENERGAN) 25 MG tablet Take 25 mg by mouth daily. No current facility-administered medications for this visit. MSE:  General Appearance: Appears healthy. Alert; in no acute distress. Pleasant. Mood & Affect: sad  and anxious    Orientation:  person, place, time/date, situation, day of week and month of year    Speech: Monotonous and Soft    Thought Process: within normal limits    Thought Content: normal    Hallucinations: Absent    Delusions: Absent    Suicidal: denies suicidal ideation    Homicidal: Negative for homicidal ideation       Memory: recent:  fair and remote:  fair    Estimated Intelligence: average    Attention/Concentration: fair    Judgment (everyday activities & social situations): limited  Insight (concerning psychiatric condition): limited    Gait and station: shuffled      Assessment:   Patient Active Problem List    Diagnosis Date Noted    Post traumatic stress disorder 05/18/2016    Depression 10/09/2014    Generalized anxiety disorder 10/09/2014    Chronic constipation 08/04/2014    Melena 08/04/2014    Bilateral lower abdominal pain 08/04/2014    Nausea 08/04/2014    Dysphagia 08/04/2014    NSAID long-term use 08/04/2014         Plan:  1. The risks, benefits, side effects, indications, contraindications, and adverse effects of the medications have been discussed. 2. Maninder  has verbalized understanding and has capacity to give informed consent. 3. The [facility] report has been reviewed according to [facility] regulations. 4. Would benefit from individual therapy  5. Follow up in 2 months   6.  The patient has been advised to call with

## 2018-12-13 ENCOUNTER — OFFICE VISIT (OUTPATIENT)
Dept: NEUROSURGERY | Facility: CLINIC | Age: 53
End: 2018-12-13

## 2018-12-13 VITALS — BODY MASS INDEX: 26.5 KG/M2 | WEIGHT: 144 LBS | HEIGHT: 62 IN

## 2018-12-13 DIAGNOSIS — M41.124 ADOLESCENT IDIOPATHIC SCOLIOSIS OF THORACIC REGION: Primary | ICD-10-CM

## 2018-12-13 DIAGNOSIS — G89.4 CHRONIC PAIN SYNDROME: ICD-10-CM

## 2018-12-13 DIAGNOSIS — M51.36 DEGENERATION OF LUMBAR INTERVERTEBRAL DISC: ICD-10-CM

## 2018-12-13 DIAGNOSIS — Z98.1 HISTORY OF FUSION OF CERVICAL SPINE: ICD-10-CM

## 2018-12-13 DIAGNOSIS — M47.896 OTHER SPONDYLOSIS, LUMBAR REGION: ICD-10-CM

## 2018-12-13 DIAGNOSIS — Z78.9 NON-TOBACCO USER: ICD-10-CM

## 2018-12-13 PROBLEM — M51.369 DEGENERATION OF LUMBAR INTERVERTEBRAL DISC: Status: ACTIVE | Noted: 2018-12-13

## 2018-12-13 PROCEDURE — 99204 OFFICE O/P NEW MOD 45 MIN: CPT | Performed by: NEUROLOGICAL SURGERY

## 2018-12-13 RX ORDER — DIAZEPAM 5 MG/1
5 TABLET ORAL 2 TIMES DAILY
Refills: 0 | COMMUNITY
Start: 2018-11-29 | End: 2021-08-09

## 2018-12-13 RX ORDER — OXYCODONE HYDROCHLORIDE 20 MG/1
20 TABLET ORAL EVERY 8 HOURS
COMMUNITY
Start: 2018-11-23 | End: 2021-08-09

## 2018-12-13 RX ORDER — TRAZODONE HYDROCHLORIDE 100 MG/1
TABLET ORAL
Refills: 2 | COMMUNITY
Start: 2018-11-27 | End: 2021-08-09

## 2018-12-13 RX ORDER — CARISOPRODOL 350 MG/1
350 TABLET ORAL EVERY 8 HOURS PRN
Refills: 0 | COMMUNITY
Start: 2018-11-29 | End: 2021-08-09

## 2018-12-13 RX ORDER — CELECOXIB 200 MG/1
200 CAPSULE ORAL DAILY
Refills: 6 | COMMUNITY
Start: 2018-11-24

## 2018-12-13 RX ORDER — PROPRANOLOL HYDROCHLORIDE 80 MG/1
80 CAPSULE, EXTENDED RELEASE ORAL DAILY
Refills: 0 | COMMUNITY
Start: 2018-11-24

## 2018-12-13 RX ORDER — GABAPENTIN 300 MG/1
900 CAPSULE ORAL 3 TIMES DAILY
COMMUNITY
Start: 2018-12-11

## 2018-12-13 RX ORDER — DULOXETIN HYDROCHLORIDE 60 MG/1
60 CAPSULE, DELAYED RELEASE ORAL DAILY
Refills: 2 | COMMUNITY
Start: 2018-11-23 | End: 2021-08-09

## 2018-12-13 NOTE — PROGRESS NOTES
"Primary Care Provider: Jose Luis Steve DO  Requesting Provider: No ref. provider found    Chief Complaint:   Chief Complaint   Patient presents with   • Back Pain     Chronic back pain since childhood. Diagnosed with scoliosis age 14, no bracing, no prior surgery. This is her 4-5th surgical evaluation for back pain.        History of Present Illness  Rossana He is a 53 y.o. female is being seen for consultation today at the request of Dr. Steve    Rossana is an unfortunate individual with a long history of back pain, anxiety, arthritis, chronic pain syndrome, fibromyalgia, and osteoporosis.    At the age of 14 she was diagnosed with adolescent idiopathic scoliosis.  This was found by her pediatrician.  She was then referred to a specialist who determined that she did not need bracing and would not need surgical intervention.  Her old pictures are not available for review.  However chest x-ray from 2014 till now show no progression.  She has a minor 14° thoracic curve.    2010 she had a C4/5 and 5/6 anterior cervical discectomy and fusion performed by Dr. Villa.  This was done for predominantly neck pain that radiated into her right arm.  She works as a black object dealer at the time and had worsening function of her right arm.  She feels that after the surgery her neck pain improved significantly in her right pain improved moderately.    2015 because of worsening back pain she went to [facility]. She saw Dr. Palencia, and now retired neurosurgeon, he felt that her back pain was severe and that she needed thoracic surgery but as he was retiring he referred her on.  She then was evaluated by Dr. Uriarte.  He did not feel that she needed thoracic surgery.  And he told the patient \"there is nothing wrong with you.\" He did recommend injections.  She received these a [facility].  Supposedly one of these injections hit her \"esophageal\" nerve.  This mimicked a heart attack resulting in a " catheterization workup.  However no cardiac condition was identified.    2016.  The patient was evaluated by Dr. Marcial Grossman.  He stated that he would not touch the thoracic spine.  He did however offered to perform a lumbar surgery for flat back.  However in the workup she was found to have osteoporosis.  She was originally started on Forteo and then transitioned to Prolia.    The patient has been frequently involved with pain management.  She is seen by Dr. Daniel at the [facility].  He has suggested that she could have fibromyalgia.    Currently the patient complains of predominantly axial back pain.  This is been present since childhood worsening since 2005.  She describes as whole back and right arm pain.  She also has pain in her bilateral legs.  Her pain is a 7 out of 10 in severity.  She feels that his life limiting.  It is 60% back pain, 20% right arm pain, and 20% bilateral leg pain.  She feels that it radiates from her thoracic spine into her right posterior arm.  She also feels that her thoracic spine and radiates down into her buttocks and posterior thighs bilaterally.  She feels like her knees are going to explode but it does not radiate past her knees. She does describe minor numbness.  She does not state that it is numb but that she has a strange feeling in her right anterior calf. She does endorse a change in fine motor function.  She has changes in her handwriting over the past few years.  She feels that all motor skills are slow due to pain.  Even showers take longer.  She endorses changes in gait.  She says that she will occasionally use a cane or walker and that she feels that she drags her right foot.  She endorses changes in bowel or bladder habits.  She feels that her bowels move slower.  Initially she says that she is incontinent of urine.  However then she says she wakes up approximately once a month needing to going cannot make it due to pain.    Her pain is  exacerbated by standing, walking, using arms, or bending.  Her pain is alleviated by laying on her Caltrate and recumbent position.  She has remained perfectly still.    Alternative therapies include she had aquatic therapy ordered by Dr. Villa several years ago which did not help.  There was no improvement.  She has not tried chiropractic.  She currently takes Celebrex.  She is on Percocet per pain management.  She receives [facility] which relieve her pain in her hips briefly.  She is also on Soma and gabapentin.    Oswestry Disability Index (Jonah et al, 1980)     Score   Pain Intensity 3   Personal Care 3   Lifting 3   Walking 3   Sitting 3   Standing 4   Sleeping 1   Sex Life (if applicable) 4   Social Life 5   Traveling 3   Previous Treatment Yes   TOTAL = Score x2  (or 2.22 if one NA) 64       SCORE INTERPRETATION OF THE OSWESTRY LBP DISABILITY QUESTIONNAIRE     60-80% Crippled Back pain impinges on all aspects of these patients’ lives both at home and at work. Positive intervention is required.       Review of Systems   Constitutional: Positive for activity change, appetite change and fatigue.   HENT: Positive for congestion, dental problem, ear discharge, ear pain and postnasal drip.    Eyes: Negative.    Respiratory: Negative.    Cardiovascular: Positive for palpitations.   Gastrointestinal: Positive for nausea.   Endocrine: Positive for cold intolerance and heat intolerance.   Genitourinary: Negative.    Musculoskeletal: Positive for back pain, gait problem, joint swelling, neck pain and neck stiffness.   Skin: Positive for pallor.   Allergic/Immunologic: Negative.    Neurological: Positive for facial asymmetry, weakness and headaches.   Hematological: Negative.    Psychiatric/Behavioral: Positive for decreased concentration and sleep disturbance. The patient is nervous/anxious.        Past Medical History:   Diagnosis Date   • Anxiety    • Arthritis    • Chronic pain  syndrome    • Fibromyalgia    • Osteoporosis        Past Surgical History:   Procedure Laterality Date   • ANTERIOR CERVICAL FUSION      C4-5, C5-6 with Dr. Villa   •  SECTION     • CHOLECYSTECTOMY         Family History: family history includes Arthritis in her father; Cancer in her father; Heart disease in her father; Hypertension in her father and mother; Rheum arthritis in her mother.    Social History:  reports that  has never smoked. she has never used smokeless tobacco. She reports that she does not drink alcohol or use drugs.    Medications:    Current Outpatient Medications:   •  carisoprodol (SOMA) 350 MG tablet, Take 350 mg by mouth Every 8 (Eight) Hours As Needed., Disp: , Rfl: 0  •  celecoxib (CeleBREX) 200 MG capsule, Take 200 mg by mouth Daily., Disp: , Rfl: 6  •  diazePAM (VALIUM) 5 MG tablet, Take 5 mg by mouth 2 (Two) Times a Day., Disp: , Rfl: 0  •  DULoxetine (CYMBALTA) 60 MG capsule, Take 60 mg by mouth Daily., Disp: , Rfl: 2  •  gabapentin (NEURONTIN) 300 MG capsule, Take 900 mg by mouth 3 (Three) Times a Day., Disp: , Rfl:   •  oxyCODONE (ROXICODONE) 20 MG tablet, Take 20 mg by mouth Every 8 (Eight) Hours., Disp: , Rfl:   •  propranolol LA (INDERAL LA) 80 MG 24 hr capsule, Take 80 mg by mouth Daily., Disp: , Rfl: 0  •  traZODone (DESYREL) 100 MG tablet, TAKE ONE TO TWO TABLETS BY MOUTH AT BEDTIME AS NEEDED FOR INSOMNIA, Disp: , Rfl: 2    Allergies:  Sulfa antibiotics    Objective   Physical Exam   Constitutional: She is oriented to person, place, and time. She appears well-developed and well-nourished.   HENT:   Head: Normocephalic and atraumatic.   Eyes: EOM are normal. Pupils are equal, round, and reactive to light.   Neck: Normal range of motion. Neck supple.   Pulmonary/Chest: Effort normal and breath sounds normal.   Abdominal: Soft.   Musculoskeletal: Normal range of motion.   Neurological: She is oriented to person, place, and time. She has a normal Finger-Nose-Finger Test, a  normal Heel to Bowles Test and a normal Tandem Gait Test.   Skin: Skin is warm and dry.   Dry cracked erythematous hands bilaterally.  Mild swelling.  No evidence of inflammation of the joint.  Psoriatic skin lesions.   Psychiatric: Her speech is normal.     Neurologic Exam     Mental Status   Oriented to person, place, and time.   Registration: recalls 3 of 3 objects. Recall at 5 minutes: recalls 3 of 3 objects.   Attention: normal. Concentration: normal.   Speech: speech is normal   Knowledge: consistent with education.     Cranial Nerves     CN II   Visual acuity: normal    CN III, IV, VI   Pupils are equal, round, and reactive to light.  Extraocular motions are normal.   Diplopia: none    CN V   Facial sensation intact.   Right corneal reflex: normal  Left corneal reflex: normal    CN VII   Right facial weakness: none  Left facial weakness: none    CN VIII   Hearing: intact    CN IX, X   Palate: symmetric  Right gag reflex: normal  Left gag reflex: normal    CN XI   Right trapezius strength: normal  Left trapezius strength: normal    CN XII   Tongue deviation: none    Motor Exam   Right arm tone: normal  Left arm tone: normal  Right arm pronator drift: absent  Left arm pronator drift: absent  Right leg tone: normal  Left leg tone: normal    Strength   Strength 5/5 except as noted.     Sensory Exam   Light touch normal.   Proprioception normal.     Gait, Coordination, and Reflexes     Gait  Gait: (Antalgic)    Coordination   Finger to nose coordination: normal  Heel to shin coordination: normal  Tandem walking coordination: normal    Reflexes   Reflexes 2+ except as noted.   Right plantar: normal  Left plantar: normal  Right Quesada: absent  Left Quesada: absent    Gait:  Able to heel and toe walk without difficulty    Back exam:   No point tenderness over spine   No point tenderness over SI joint right and left   No pain with later loading of hip    Hip exam:   Negative VIVIANA right and left    Negative straight  leg Raise bilaterally.      Imaging: (independent review and interpretation)  Chest x-ray from 2014 in 2016.  There is evidence of cervical instrumentation from an anterior cervical discectomy and fusion.  There is an approximately 14° main thoracic curve consistent with a very very mild less idiopathic scoliosis.  There is no evidence of progression in these films.  There is no evidence of rib vertebral angle discrepancy.      MRI of the cervical spine noncontrast from 2015, 16, and 17 reviewed.  Evidence of C4 5 and C5-C6 anterior cervical discectomy and fusion.  There is mild flattening of the cervical spine.  No other evidence of disc herniation or thecal sac compression.  There is no evidence of T2 signal change within the cord.  No change from year to year.    MRI of the thoracic spine noncontrast from 2015, 16, and 17 reviewed.  There are several areas of mild disc bulging.  No evidence of significant thecal sac compression or stenosis.  No change from ear to ear.    MRI of the lumbar spine from 2015, 16, and 17 reviewed  Mild degenerative changes at all levels of the lumbar spine.  There is evidence of flattening of the lumbar spine.  No evidence of central or foraminal stenosis.  No bunching of the nerve roots.  No T2 signal change within the cord.    ASSESSMENT and PLAN  Rossana He is a 53 y.o. female with a significant comorbidity osteoporosis, fibromyalgia, anxiety, and cervical stenosis status post 2 level anterior cervical discectomy and fusion. She presents with a new problem of predominantly axial back pain with some radiation into her right posterior arm and numbness and tingling in her bilateral lower extremities. Physical exam findings of neurologically intact with a significant antalgic gait..  Her imaging shows no significant cervical, thoracic, or lumbar compression on either her 2015, 2016, or 2017 MRIs.  While her chest film showed approximately 14° scoliosis this has very minimal axial  rotation and no evidence of progression.      Regarding her scoliosis: Her physical exam when bending forward shows right rib prominence.  But based on her x-rays this is most likely the result of the patient tensing her muscles on the right side.  Without altered rib vertebral angle or significant axial rotation of her spine this should not occur.  Thoracic scoliosis are typically instrumented at 50°.  Curves of 14° show virtually no likelihood of progression in adulthood based on several large studies.  Recommending conservative care.  This should also not be contributing significantly to her back pain.    Regarding her significant back pain: This is predominantly axial back pain with a normal neurological examination. Differential diagnosis includes dark disc disease, facet arthropathy, flat back syndrome, fibromyalgia, chronic pain, myofascial pain, conversion disorder, or malingering.  The patient has no radiographic pathology to explain her pain syndrome.  No surgical intervention needed.    I did however note that the patient has significant redness and cracking in her hands.  She reports an EM that is on the disabled range of back pain.  Whole body pain that is out of proportion to radiographic imaging can sometimes be rheumatological.  Therefore I recommend referral to rheumatology.    PT/OT as tolerated.  Patient does not feel that this will hold any benefit for her.  Massage and Chiropractic as tolerated  Nonsteroidal anti-inflammatories, she is currently on Celebrex  Continue with pain management, Dr Daniel, for epidural/facet injection.  She has no radiographic contraindications to injections.  We also discussed weight loss and healthy lifestyle as an adjuvant    Rossana was seen today for back pain.    Diagnoses and all orders for this visit:    Adolescent idiopathic scoliosis of thoracic region  -     Ambulatory Referral to Rheumatology    Degeneration of lumbar intervertebral disc  -     Ambulatory  Referral to Rheumatology    Other spondylosis, lumbar region  -     Ambulatory Referral to Rheumatology    Chronic pain syndrome  -     Ambulatory Referral to Rheumatology    History of fusion of cervical spine  -     Ambulatory Referral to Rheumatology    BMI 26.0-26.9,adult    Non-tobacco user        Return if symptoms worsen or fail to improve.    Thank you for this Consultation and the opportunity to participate in Rossana's care.    Sincerely,  James Stearns MD    Level of Risk: Moderate due to: undiagnosed new problem  MDM: High Complexity  (Mod = 60777, High = 93310)

## 2018-12-14 ENCOUNTER — OFFICE VISIT (OUTPATIENT)
Dept: PSYCHIATRY | Age: 53
End: 2018-12-14
Payer: MEDICAID

## 2018-12-14 VITALS
HEIGHT: 62 IN | WEIGHT: 140 LBS | BODY MASS INDEX: 25.76 KG/M2 | SYSTOLIC BLOOD PRESSURE: 161 MMHG | OXYGEN SATURATION: 100 % | HEART RATE: 61 BPM | DIASTOLIC BLOOD PRESSURE: 94 MMHG

## 2018-12-14 DIAGNOSIS — F43.10 PTSD (POST-TRAUMATIC STRESS DISORDER): ICD-10-CM

## 2018-12-14 DIAGNOSIS — F33.1 MAJOR DEPRESSIVE DISORDER, RECURRENT EPISODE, MODERATE (HCC): ICD-10-CM

## 2018-12-14 DIAGNOSIS — F41.1 GAD (GENERALIZED ANXIETY DISORDER): Primary | ICD-10-CM

## 2018-12-14 DIAGNOSIS — M45.9 RHEUMATOID ARTHRITIS INVOLVING VERTEBRA WITH POSITIVE RHEUMATOID FACTOR (HCC): ICD-10-CM

## 2018-12-14 PROCEDURE — 99213 OFFICE O/P EST LOW 20 MIN: CPT | Performed by: NURSE PRACTITIONER

## 2018-12-14 RX ORDER — DIAZEPAM 5 MG/1
5 TABLET ORAL EVERY 8 HOURS PRN
Qty: 60 TABLET | Refills: 0 | Status: SHIPPED | OUTPATIENT
Start: 2018-12-14 | End: 2019-01-28 | Stop reason: SDUPTHER

## 2018-12-14 NOTE — PROGRESS NOTES
Progress Note    12/14/2018          Gerri Hickey 1965     Time in: 0831   Time out: 16257      Chief Complaint   Patient presents with   [facility] Psychiatric Evaluation     \"I'm really stressed with the pain, It's a lot of pain. \"       History obtained via chart review and patient interview     Subjective:  Patient is a 48 y.o. Year old female who presents needing medication management for the problems of pain and depression. Patient Active Problem List    Diagnosis Date Noted    Rheumatoid arthritis involving vertebra with positive rheumatoid factor ([facility] [address] 12/14/2018    Post traumatic stress disorder 05/18/2016    Depression 10/09/2014    Generalized anxiety disorder 10/09/2014    Chronic constipation 08/04/2014    Melena 08/04/2014    Bilateral lower abdominal pain 08/04/2014    Nausea 08/04/2014    Dysphagia 08/04/2014    NSAID long-term use 08/04/2014   . Darylene Hoes is not reporting any side effects from prescribed medications and is  medication complaint. Today Darylene Hoes states she was just told she has RA. She is concerned about the side effect of the medications. \"I had neck surgery in 2010 I was old I have DDD and arthritis in my neck and spine. Psychosocial Stressors:  At risk for depression and At risk for social isolation    Review of Systems - Review of Systems - Negative except as listed  History obtained from the patient  General ROS: positive for  - fatigue  Psychological ROS: positive for - anxiety and depression  Musculoskeletal ROS: positive for - joint pain and muscle pain    Objective:     Family History   Problem Relation Age of Onset    Colon Polyps Father     Lung Cancer Father     Heart Disease Father         stents    Depression Father     Colon Polyps Paternal Grandmother     Colon Cancer Paternal Grandmother     Depression Sister     Esophageal Cancer Neg Hx     Liver Cancer Neg Hx     Stomach Cancer Neg Hx

## 2018-12-19 NOTE — PROGRESS NOTES
In regards to Rheum referral, no one in area participating with patient's insurance. Our referral has been canceled, and we have asked for help from Dr. Steve office in regards to. I have contacted patient and left this information on her VM.

## 2018-12-26 ENCOUNTER — TELEPHONE (OUTPATIENT)
Dept: PSYCHIATRY | Age: 53
End: 2018-12-26

## 2019-01-07 ENCOUNTER — HOSPITAL ENCOUNTER (EMERGENCY)
Facility: HOSPITAL | Age: 54
Discharge: HOME | End: 2019-01-07
Attending: EMERGENCY MEDICINE | Admitting: EMERGENCY MEDICINE

## 2019-01-07 VITALS
OXYGEN SATURATION: 100 % | BODY MASS INDEX: 25.98 KG/M2 | RESPIRATION RATE: 15 BRPM | DIASTOLIC BLOOD PRESSURE: 79 MMHG | TEMPERATURE: 98.6 F | HEART RATE: 59 BPM | WEIGHT: 141.2 LBS | SYSTOLIC BLOOD PRESSURE: 143 MMHG | HEIGHT: 62 IN

## 2019-01-07 DIAGNOSIS — R21 RASH: Primary | ICD-10-CM

## 2019-01-07 DIAGNOSIS — L30.9 DERMATITIS: ICD-10-CM

## 2019-01-07 LAB
HOLD SPECIMEN: NORMAL
HOLD SPECIMEN: NORMAL
WHOLE BLOOD HOLD SPECIMEN: NORMAL
WHOLE BLOOD HOLD SPECIMEN: NORMAL

## 2019-01-07 PROCEDURE — 99283 EMERGENCY DEPT VISIT LOW MDM: CPT

## 2019-01-07 PROCEDURE — 25010000002 DEXAMETHASONE PER 1 MG: Performed by: EMERGENCY MEDICINE

## 2019-01-07 PROCEDURE — 96374 THER/PROPH/DIAG INJ IV PUSH: CPT

## 2019-01-07 RX ORDER — PREDNISONE 10 MG/1
10 TABLET ORAL DAILY
Qty: 7 TABLET | Refills: 0 | Status: SHIPPED | OUTPATIENT
Start: 2019-01-07 | End: 2021-08-09

## 2019-01-07 RX ORDER — DEXAMETHASONE SODIUM PHOSPHATE 4 MG/ML
4 INJECTION, SOLUTION INTRA-ARTICULAR; INTRALESIONAL; INTRAMUSCULAR; INTRAVENOUS; SOFT TISSUE ONCE
Status: COMPLETED | OUTPATIENT
Start: 2019-01-07 | End: 2019-01-07

## 2019-01-07 RX ORDER — CEPHALEXIN 500 MG/1
500 CAPSULE ORAL 2 TIMES DAILY
Qty: 14 CAPSULE | Refills: 0 | Status: SHIPPED | OUTPATIENT
Start: 2019-01-07 | End: 2021-08-09

## 2019-01-07 RX ADMIN — DEXAMETHASONE SODIUM PHOSPHATE 4 MG: 4 INJECTION, SOLUTION INTRA-ARTICULAR; INTRALESIONAL; INTRAMUSCULAR; INTRAVENOUS; SOFT TISSUE at 04:01

## 2019-01-07 NOTE — DISCHARGE INSTRUCTIONS
Please use topical over-the-counter hydrocortisone on areas that are itchy and painful on the skin, topical or oral Benadryl as needed for itching.  He'll be prescribed a prescription of prednisone that he can use as needed if symptoms worsen.  Please follow with her primary care doctor within the next 48 hours.

## 2019-01-07 NOTE — ED PROVIDER NOTES
Subjective   53-year-old female presenting to the emergency department with areas of itching on her skin.  Patient states the symptoms of cough for 2 weeks and she feels they started when she started a topical ointment on her dog.  Patient states that over the past 2 days his symptoms got worse and she's noted circular spots on her skin which are really itchy and painful.  Patient states she also noticed her on her arms today.  Patient denies any fevers chills any difficulty breathing or any other symptoms.            Review of Systems   Skin: Positive for rash.       Past Medical History:   Diagnosis Date   • Anxiety    • Arthritis    • Chronic pain syndrome    • Fibromyalgia    • Osteoporosis        Allergies   Allergen Reactions   • Sulfa Antibiotics Rash       Past Surgical History:   Procedure Laterality Date   • ANTERIOR CERVICAL FUSION      C4-5, C5-6 with Dr. Villa   •  SECTION     • CHOLECYSTECTOMY         Family History   Problem Relation Age of Onset   • Hypertension Mother    • Rheum arthritis Mother    • Arthritis Father    • Cancer Father    • Heart disease Father    • Hypertension Father        Social History     Socioeconomic History   • Marital status:      Spouse name: Not on file   • Number of children: Not on file   • Years of education: Not on file   • Highest education level: Not on file   Tobacco Use   • Smoking status: Never Smoker   • Smokeless tobacco: Never Used   Substance and Sexual Activity   • Alcohol use: No     Frequency: Never   • Drug use: No   • Sexual activity: Defer           Objective   Physical Exam   Constitutional: She is oriented to person, place, and time. She appears well-developed and well-nourished.   HENT:   Head: Normocephalic and atraumatic.   Eyes: EOM are normal. Pupils are equal, round, and reactive to light.   Neck: Normal range of motion. Neck supple.   Cardiovascular: Normal rate, regular rhythm and normal heart sounds.   2+ pulses in upper  and lower extremities   Pulmonary/Chest: Effort normal and breath sounds normal.   Abdominal: Soft. Bowel sounds are normal.   Musculoskeletal: Normal range of motion.   Neurological: She is alert and oriented to person, place, and time.   Skin: Skin is warm. Capillary refill takes less than 2 seconds.   Areas of superficial irritation to the skin along the back as well as 2 areas on the face which are smaller in diameter as well as pencil size areas approximately 3 on both forearms bilaterally.  Skin overall rough and dry.   Psychiatric: She has a normal mood and affect. Her behavior is normal. Thought content normal.   Nursing note and vitals reviewed.      Procedures           ED Course  ED Course as of Jan 07 0355   Mon Jan 07, 2019   0350 Rash concerning for dermatitis with possible infection.  Recommend hydrocortisone cream as well as oral Keflex.  Patient will be discharged as well with a prescription for low-dose prednisone however patient states that she shakes a lot when she takes prednisone.  Patient will follow-up with Dr. Steve the next 48 hours.  Low suspicion for severe allergy such as a type I allergic reaction or severe allergic reactions or autoimmune reaction such as Evans-Jesse syndrome  [AP]      ED Course User Index  [AP] Ladonna Smith MD                  [facility]      Final diagnoses:   Rash   Dermatitis            Ladonna Smith MD  01/07/19 0351

## 2019-01-28 DIAGNOSIS — F41.1 GAD (GENERALIZED ANXIETY DISORDER): ICD-10-CM

## 2019-01-28 RX ORDER — DIAZEPAM 5 MG/1
5 TABLET ORAL EVERY 8 HOURS PRN
Qty: 60 TABLET | Refills: 0 | Status: SHIPPED | OUTPATIENT
Start: 2019-01-28 | End: 2019-02-14 | Stop reason: SDUPTHER

## 2019-02-12 ENCOUNTER — TELEPHONE (OUTPATIENT)
Dept: PSYCHIATRY | Age: 54
End: 2019-02-12

## 2019-02-14 ENCOUNTER — OFFICE VISIT (OUTPATIENT)
Dept: PSYCHIATRY | Age: 54
End: 2019-02-14
Payer: MEDICAID

## 2019-02-14 DIAGNOSIS — F41.1 GAD (GENERALIZED ANXIETY DISORDER): ICD-10-CM

## 2019-02-14 DIAGNOSIS — G47.00 INSOMNIA, UNSPECIFIED TYPE: ICD-10-CM

## 2019-02-14 DIAGNOSIS — F33.9 EPISODE OF RECURRENT MAJOR DEPRESSIVE DISORDER, UNSPECIFIED DEPRESSION EPISODE SEVERITY (HCC): ICD-10-CM

## 2019-02-14 PROCEDURE — 99214 OFFICE O/P EST MOD 30 MIN: CPT | Performed by: CLINICAL NURSE SPECIALIST

## 2019-02-14 RX ORDER — DIAZEPAM 5 MG/1
5 TABLET ORAL EVERY 8 HOURS PRN
Qty: 60 TABLET | Refills: 0 | Status: SHIPPED | OUTPATIENT
Start: 2019-02-14 | End: 2019-04-23 | Stop reason: SDUPTHER

## 2019-02-14 RX ORDER — DULOXETIN HYDROCHLORIDE 60 MG/1
CAPSULE, DELAYED RELEASE ORAL
Qty: 30 CAPSULE | Refills: 2 | Status: SHIPPED | OUTPATIENT
Start: 2019-02-14 | End: 2019-05-24 | Stop reason: SDUPTHER

## 2019-02-14 RX ORDER — TRAZODONE HYDROCHLORIDE 100 MG/1
TABLET ORAL
Qty: 60 TABLET | Refills: 2 | Status: SHIPPED | OUTPATIENT
Start: 2019-02-14 | End: 2019-06-04

## 2019-03-24 ENCOUNTER — HOSPITAL ENCOUNTER (EMERGENCY)
Age: 54
Discharge: LEFT BEFORE DISCHARGE | End: 2019-03-24
Payer: MEDICAID

## 2019-03-24 VITALS
OXYGEN SATURATION: 98 % | SYSTOLIC BLOOD PRESSURE: 109 MMHG | HEIGHT: 62 IN | BODY MASS INDEX: 23.19 KG/M2 | WEIGHT: 126 LBS | HEART RATE: 78 BPM | DIASTOLIC BLOOD PRESSURE: 70 MMHG | TEMPERATURE: 98.2 F

## 2019-03-24 PROCEDURE — 4500000002 HC ER NO CHARGE

## 2019-03-30 ENCOUNTER — APPOINTMENT (OUTPATIENT)
Dept: GENERAL RADIOLOGY | Age: 54
End: 2019-03-30
Payer: MEDICAID

## 2019-03-30 ENCOUNTER — HOSPITAL ENCOUNTER (EMERGENCY)
Age: 54
Discharge: HOME | End: 2019-03-30
Payer: MEDICAID

## 2019-03-30 VITALS
OXYGEN SATURATION: 98 % | SYSTOLIC BLOOD PRESSURE: 134 MMHG | HEART RATE: 78 BPM | TEMPERATURE: 98.4 F | BODY MASS INDEX: 23 KG/M2 | WEIGHT: 125 LBS | HEIGHT: 62 IN | RESPIRATION RATE: 18 BRPM | DIASTOLIC BLOOD PRESSURE: 78 MMHG

## 2019-03-30 DIAGNOSIS — F22 DELUSIONS OF PARASITOSIS (HCC): Primary | ICD-10-CM

## 2019-03-30 DIAGNOSIS — R21 RASH AND OTHER NONSPECIFIC SKIN ERUPTION: ICD-10-CM

## 2019-03-30 DIAGNOSIS — M54.50 RIGHT-SIDED LOW BACK PAIN WITHOUT SCIATICA, UNSPECIFIED CHRONICITY: ICD-10-CM
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
## 2019-03-30 LAB
ACETAMINOPHEN LEVEL: <15 UG/ML
ALBUMIN SERPL-MCNC: 3.6 G/DL (ref 3.5–5.2)
ALP BLD-CCNC: 66 U/L (ref 35–104)
ALT SERPL-CCNC: 25 U/L (ref 5–33)
AMPHETAMINE SCREEN, URINE: NEGATIVE
ANION GAP SERPL CALCULATED.3IONS-SCNC: 9 MMOL/L (ref 7–19)
AST SERPL-CCNC: 22 U/L (ref 5–32)
BARBITURATE SCREEN URINE: NEGATIVE
BASOPHILS ABSOLUTE: 0 K/UL (ref 0–0.2)
BASOPHILS RELATIVE PERCENT: 0.5 % (ref 0–1)
BENZODIAZEPINE SCREEN, URINE: POSITIVE
BILIRUB SERPL-MCNC: <0.2 MG/DL (ref 0.2–1.2)
BILIRUBIN URINE: ABNORMAL
BLOOD, URINE: NEGATIVE
BUN BLDV-MCNC: 4 MG/DL (ref 6–20)
CALCIUM SERPL-MCNC: 8.9 MG/DL (ref 8.6–10)
CANNABINOID SCREEN URINE: NEGATIVE
CHLORIDE BLD-SCNC: 101 MMOL/L (ref 98–111)
CLARITY: ABNORMAL
CO2: 29 MMOL/L (ref 22–29)
COCAINE METABOLITE SCREEN URINE: NEGATIVE
COLOR: YELLOW
CREAT SERPL-MCNC: 0.7 MG/DL (ref 0.5–0.9)
EOSINOPHILS ABSOLUTE: 0.1 K/UL (ref 0–0.6)
EOSINOPHILS RELATIVE PERCENT: 1.8 % (ref 0–5)
GFR NON-AFRICAN AMERICAN: >60
GLUCOSE BLD-MCNC: 99 MG/DL (ref 74–109)
GLUCOSE URINE: NEGATIVE MG/DL
HCT VFR BLD CALC: 33.3 % (ref 37–47)
HEMOGLOBIN: 10.9 G/DL (ref 12–16)
KETONES, URINE: NEGATIVE MG/DL
LEUKOCYTE ESTERASE, URINE: NEGATIVE
LYMPHOCYTES ABSOLUTE: 2.3 K/UL (ref 1.1–4.5)
LYMPHOCYTES RELATIVE PERCENT: 37.8 % (ref 20–40)
Lab: ABNORMAL
MCH RBC QN AUTO: 30.6 PG (ref 27–31)
MCHC RBC AUTO-ENTMCNC: 32.7 G/DL (ref 33–37)
MCV RBC AUTO: 93.5 FL (ref 81–99)
MONOCYTES ABSOLUTE: 0.5 K/UL (ref 0–0.9)
MONOCYTES RELATIVE PERCENT: 8.7 % (ref 0–10)
NEUTROPHILS ABSOLUTE: 3.1 K/UL (ref 1.5–7.5)
NEUTROPHILS RELATIVE PERCENT: 51 % (ref 50–65)
NITRITE, URINE: NEGATIVE
OPIATE SCREEN URINE: NEGATIVE
PDW BLD-RTO: 12.2 % (ref 11.5–14.5)
PH UA: 5.5 (ref 5–8)
PLATELET # BLD: 213 K/UL (ref 130–400)
PMV BLD AUTO: 9.7 FL (ref 9.4–12.3)
POTASSIUM SERPL-SCNC: 3.4 MMOL/L (ref 3.5–5)
PROTEIN UA: ABNORMAL MG/DL
RBC # BLD: 3.56 M/UL (ref 4.2–5.4)
SALICYLATE, SERUM: <3 MG/DL (ref 3–10)
SODIUM BLD-SCNC: 139 MMOL/L (ref 136–145)
SPECIFIC GRAVITY UA: 1.02 (ref 1–1.03)
TOTAL PROTEIN: 6.2 G/DL (ref 6.6–8.7)
UROBILINOGEN, URINE: 0.2 E.U./DL
WBC # BLD: 6 K/UL (ref 4.8–10.8)

## 2019-03-30 PROCEDURE — 99284 EMERGENCY DEPT VISIT MOD MDM: CPT

## 2019-03-30 PROCEDURE — 36415 COLL VENOUS BLD VENIPUNCTURE: CPT

## 2019-03-30 PROCEDURE — 85025 COMPLETE CBC W/AUTO DIFF WBC: CPT

## 2019-03-30 PROCEDURE — G0480 DRUG TEST DEF 1-7 CLASSES: HCPCS

## 2019-03-30 PROCEDURE — 80053 COMPREHEN METABOLIC PANEL: CPT

## 2019-03-30 PROCEDURE — 72100 X-RAY EXAM L-S SPINE 2/3 VWS: CPT

## 2019-03-30 PROCEDURE — 99284 EMERGENCY DEPT VISIT MOD MDM: CPT | Performed by: EMERGENCY MEDICINE

## 2019-03-30 PROCEDURE — 81003 URINALYSIS AUTO W/O SCOPE: CPT

## 2019-03-30 PROCEDURE — 80307 DRUG TEST PRSMV CHEM ANLYZR: CPT

## 2019-03-30 PROCEDURE — 73502 X-RAY EXAM HIP UNI 2-3 VIEWS: CPT

## 2019-03-30 ASSESSMENT — LIFESTYLE VARIABLES: HISTORY_ALCOHOL_USE: NO

## 2019-03-30 ASSESSMENT — PAIN DESCRIPTION - ORIENTATION: ORIENTATION: LEFT

## 2019-03-30 ASSESSMENT — PAIN SCALES - GENERAL: PAINLEVEL_OUTOF10: 7

## 2019-03-30 ASSESSMENT — PAIN DESCRIPTION - LOCATION: LOCATION: BACK;BUTTOCKS

## 2019-03-30 NOTE — ED PROVIDER NOTES
[facility] EMERG[facility]Y DE[facility]  eMERGENCY dEPARTMENT eNCOUnter      Pt Name: Tushar Mario  MRN: [id number]  [place] 1965  Date of evaluation: 3/30/2019  Provider: ALESSANDRA Yan    CHIEF COMPLAINT       Chief Complaint   Patient presents with    Back Pain     from fall.  Fall     fell tuesday. right knee gave out on her and she fell. pt hit head on a chair and butt and hip hit the floor.  Rash     pt has been seeing bugs. HISTORY OF PRESENT ILLNESS   (Location/Symptom, Timing/Onset,Context/Setting, Quality, Duration, Modifying Factors, Severity)  Note limiting factors. Tushar Mario is a 48 y.o. female who presents to the emergency department  with back and right hip pain after a fall 3 days ago. She states her knee gave out. This caused her to fall backward onto her buttocks. Still having soreness and pain. Did not hit her heAD. She also says she has bugs in her skin. This has gone on for 9 weeks apparently. She has seen a dermatologist AND 2 different physicians. She's been given ivermectin twice as well as the treatment for scabies. iT WILL GET BETTER AND THen comes back. She states her dog had similar worms that the dog  of. She tells me she has researched on the Internet and at times can see the worms. SHe told me a scientific name for the worms from her research. The worms she is concerned about are heartworms. She says her family think she is crazy. No one else in the family has worms. HAs a psych history and sees pain management    The history is provided by the patient. Fall   The accident occurred more than 2 days ago. The fall occurred while walking. She fell from a height of 3 to 5 ft. She landed on a hard floor. There was no blood loss. The point of impact was the right hip (buttock). The pain is moderate. She was ambulatory at the scene. The symptoms are aggravated by standing. NursingNotes were reviewed.     REVIEW OF SYSTEMS    (2-9 systems for level 4, 10 or Source Pulse Resp SpO2 Height Weight   (!) 140/80 98.4 °F (36.9 °C) Oral 75 17 98 % 5' 2\" (1.575 m) 125 lb (56.7 kg)       Physical Exam   Constitutional: She appears well-developed and well-nourished. HENT:   Head: Normocephalic and atraumatic. Eyes: Right eye exhibits no discharge. Left eye exhibits no discharge. No scleral icterus. Neck: Normal range of motion. Neck supple. Cardiovascular: Normal rate. Pulmonary/Chest: No respiratory distress. Neurological: She is alert. Skin: Rash noted. Dryness to skin   Psychiatric: Her behavior is normal. Her mood appears anxious. Her speech is rapid and/or pressured. Thought content is paranoid. She expresses no homicidal and no suicidal ideation. Nursing note and vitals reviewed. DIAGNOSTIC RESULTS     EKG: All EKG's are interpreted by the Emergency Department Physician who either signs or Co-signsthis chart in the absence of a cardiologist.        RADIOLOGY:   Pepin Renato such as CT, Ultrasound and MRI are read by the radiologist. [facility] radiographic images are visualized and preliminarily interpreted by the emergency physician with the below findings:      Interpretation per the Radiologist below, if available at the time of this note:    XR HIP 2-3 VW W PELVIS RIGHT   Final Result   Negative pelvis and right hip. Signed by Dr Griffin Ngo on 3/30/2019 8:26 AM      XR LUMBAR SPINE (2-3 VIEWS)   Final Result   No lumbar spine fracture or subluxation.    Signed by Dr Griffin Ngo on 3/30/2019 8:41 AM            ED BEDSIDEULTRASOUND:   Performed by ED Physician -none    LABS:  Labs Reviewed   URINALYSIS - Abnormal; Notable for the following components:       Result Value    Clarity, UA CLOUDY (*)     Bilirubin Urine SMALL (*)     Protein, UA TRACE (*)     All other components within normal limits   URINE DRUG SCREEN - Abnormal; Notable for the following components:    Benzodiazepine Screen, Urine Positive (*)     All other components within normal limits   CBC WITH AUTO DIFFERENTIAL - Abnormal; Notable for the following components:    RBC 3.56 (*)     Hemoglobin 10.9 (*)     Hematocrit 33.3 (*)     MCHC 32.7 (*)     All other components within normal limits   COMPREHENSIVE METABOLIC PANEL - Abnormal; Notable for the following components:    Potassium 3.4 (*)     BUN 4 (*)     Total Protein 6.2 (*)     All other components within normal limits   ACETAMINOPHEN LEVEL   SALICYLATE LEVEL       All other labs were within normal range or not returned as of this dictation. EMERGENCY DEPARTMENT COURSE and DIFFERENTIALDIAGNOSIS/MDM:   Vitals:    Vitals:    03/30/19 0037 03/30/19 0508   BP: (!) 140/80 134/78   Pulse: 75 78   Resp: 17 18   Temp: 98.4 °F (36.9 °C)    TempSrc: Oral    SpO2: 98% 98%   Weight: 125 lb (56.7 kg)    Height: 5' 2\" (1.575 m)            MDM  Pt's family is worried about her. Denies SI or HI. Agreeable to a mental health eval.  Pt left in care of Dr William Marrufo      CONSULTS:  None    PROCEDURES:  Unless otherwise noted below, none     Procedures    FINAL IMPRESSION      1. Delusions of parasitosis ([address]    2. Rash and other nonspecific skin eruption    3. Fall, initial encounter    4.  Right-sided low back pain without sciatica, unspecified chronicity        DISPOSITION/PLAN   DISPOSITION        PATIENT REFERRED TO:  Nick Azevedo, [place] [address]  [phone]    Call in 3 days  For follow up, As needed      DISCHARGE MEDICATIONS:  Discharge Medication List as of 3/30/2019  4:50 AM             (Please note that portions of this note were completed with a voice recognitionprogram.  Efforts were made to edit the dictations but occasionally words are mis-transcribed.)    ALESSANDRA Bolaños (electronically signed)          ALESSANDRA Bolaños  03/30/19 [facility], ALESSANDRA  03/30/19 4726

## 2019-03-30 NOTE — ED PROVIDER NOTES
[facility] EMERGENCY DEPT  eMERGENCYdEPARTMENT eNCOUnter      Pt Name: Ryan Conteh  MRN: [id number]  [place] 1965  Date of evaluation: 3/30/2019  Lynne Anand MD    Emergency Department care of this patient was assumed at 0330 from [facility]. We have discussed the case and the plan of care. I have seen and evaluated patient and reviewed ED course. CHIEF COMPLAINT       Chief Complaint   Patient presents with    Back Pain     from fall.  Fall     fell tuesday. right knee gave out on her and she fell. pt hit head on a chair and butt and hip hit the floor.  Rash     pt has been seeing bugs. PHYSICAL EXAM    (up to 7 for level 4, 8 or more for level 5)     ED Triage Vitals [03/30/19 0037]   BP Temp Temp Source Pulse Resp SpO2 Height Weight   (!) 140/80 98.4 °F (36.9 °C) Oral 75 17 98 % 5' 2\" (1.575 m) 125 lb (56.7 kg)       Physical Exam  Please see Arianna's note for full details. DIAGNOSTIC RESULTS     EKG: All EKG's are interpreted by the Emergency Department Physician who either signs or Co-signs this chart inthe absence of a cardiologist.        RADIOLOGY:   Non-plain film imagessuch as CT, Ultrasound and MRI are read by the radiologist. Plain radiographic images are visualized and preliminarily interpreted by the emergency physician with the below findings:          XR HIP 2-3 VW W PELVIS RIGHT   Final Result   Negative pelvis and right hip. Signed by Dr Jaspreet Branham on 3/30/2019 8:26 AM      XR LUMBAR SPINE (2-3 VIEWS)   Final Result   No lumbar spine fracture or subluxation.    Signed by Dr Jaspreet Branham on 3/30/2019 8:41 AM              LABS:  Labs Reviewed   URINALYSIS - Abnormal; Notable for the following components:       Result Value    Clarity, UA CLOUDY (*)     Bilirubin Urine SMALL (*)     Protein, UA TRACE (*)     All other components within normal limits   URINE DRUG SCREEN - Abnormal; Notable for the following components:    Benzodiazepine Screen, Urine Positive (*) All other components within normal limits   CBC WITH AUTO DIFFERENTIAL - Abnormal; Notable for the following components:    RBC 3.56 (*)     Hemoglobin 10.9 (*)     Hematocrit 33.3 (*)     MCHC 32.7 (*)     All other components within normal limits   COMPREHENSIVE METABOLIC PANEL - Abnormal; Notable for the following components:    Potassium 3.4 (*)     BUN 4 (*)     Total Protein 6.2 (*)     All other components within normal limits   ACETAMINOPHEN LEVEL   SALICYLATE LEVEL       All other labs were within normal range or not returned as of this dictation. EMERGENCY DEPARTMENT COURSE and DIFFERENTIAL DIAGNOSIS/MDM:   Vitals:    Vitals:    03/30/19 0037 03/30/19 0508   BP: (!) 140/80 134/78   Pulse: 75 78   Resp: 17 18   Temp: 98.4 °F (36.9 °C)    TempSrc: Oral    SpO2: 98% 98%   Weight: 125 lb (56.7 kg)    Height: 5' 2\" (1.575 m)        MDM  Number of Diagnoses or Management Options  Delusions of parasitosis ([facility] [facility]): new and requires workup  Fall, initial encounter: new and requires workup  Rash and other nonspecific skin eruption: new and requires workup  Right-sided low back pain without sciatica, unspecified chronicity: new and requires workup  Diagnosis management comments: Patient's workup this evening was initially completed by Arya Quick. Because of the delusions that she wanted patient evaluated by behavioral health before discharge. Mary Jane Jeffries, with behavioral health, came down and evaluated the patient. She feels that patient has parasitosis. Otherwise there is no reason to admit this patient at this time. Patient will be discharged home. Patient Progress  Patient progress: stable      Reassessment      CONSULTS:  None    PROCEDURES:  Unless otherwise noted below, none     Procedures    FINAL IMPRESSION      1. Delusions of parasitosis ([address]    2. Rash and other nonspecific skin eruption    3. Fall, initial encounter    4.  Right-sided low back pain without sciatica, unspecified chronicity DISPOSITION/PLAN   DISPOSITION Decision To Discharge    PATIENT REFERRED TO:  Eddie Brown DO  [address] [facility] [address] [place] [address]  [phone]    Call in 3 days  For follow up, As needed      DISCHARGE MEDICATIONS:  Discharge Medication List as of 3/30/2019  4:50 AM             (Please note that portions ofthis note were completed with a voice recognition program.  Efforts were made to edit the dictations but occasionally words are mis-transcribed.)    Umair Orellana MD(electronically signed)  Attending Emergency Physician          Sloan Wilkins MD  03/30/19 Veronica Gil MD  03/30/19 9470
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
## 2019-04-23 DIAGNOSIS — F41.1 GAD (GENERALIZED ANXIETY DISORDER): ICD-10-CM

## 2019-04-23 RX ORDER — DIAZEPAM 5 MG/1
5 TABLET ORAL EVERY 8 HOURS PRN
Qty: 60 TABLET | Refills: 0 | Status: SHIPPED | OUTPATIENT
Start: 2019-04-23 | End: 2019-05-23

## 2019-04-23 NOTE — TELEPHONE ENCOUNTER
Last OV:  02.14.19    Next OV:   05.29.19     Requested Prescriptions     Pending Prescriptions Disp Refills    diazepam (VALIUM) 5 MG tablet 60 tablet 0     Sig: Take 1 tablet by mouth every 8 hours as needed for Anxiety for up to 30 days. Last obtained 1/29/19. Do not renew before 30 days of last script     4/23/2019 1:36 PM   Progress Note        Milly Montes 1965  Psychotherapy Time Spent: 25 min      Psychotherapy Topics: health    PCP IS:        Subjective:  Patient is a 48year old  woman diagnosed with anxiety, depression, chronic pain and presents today for follow-up. Last seen in clinic on 12/14 and prior records were reviewed. History obtained via chart review and patient    CHIEF COMPLAINT: anxiety    Today patient states, Dog had contracted cheyliteilosis mite, dog passed it on to the patient. She saw her doctor who gave her 12 mg iv Ivermec  And instructions to repeat in one week. She went to a dermatologist who gave her permethrin, \"it still felt like bugs all over me,\" she saw another doctor who did scrapings and determined she is clear of this mite. Aydin treated herself aggressively for the mite. \"My fingertips are sensitive. If a piece of dust lands on my face it feels like it is back. \" She reports that she  Is staying with her father. Reports the diazepam helps. She has been anxious her whole life. SUBSTANCE USE/ABUSE:   Tobacco: denied   Alcohol: denied   Marijuana: denied   Street/recreational drugs: denied    DANGEROUSNESS:   Suicide ideation: denied   Homicidal ideation/dangerousness to others: denied        Review of Systems - 14 point review negative today for back pain and subjective feelings above described        Current Meds:    Prior to Admission medications    Medication Sig Start Date End Date Taking?  Authorizing Provider   traZODone (DESYREL) 100 MG tablet TAKE ONE TO TWO TABLETS BY MOUTH AT BEDTIME AS NEEDED FOR INSOMNIA 2/14/19   Ed Hernández Tam Damian, APRN - CNP   DULoxetine (CYMBALTA) 60 MG extended release capsule TAKE 1 CAPSULE BY MOUTH EVERY DAY 2/14/19   Ryder iJmenez APRN - CNP   gabapentin (NEURONTIN) 300 MG capsule TAKE 3 CAPSULES BY MOUTH EVERY 8 HOURS. 2/16/18 3/16/18  Stefan RidtalonALESSANDRA   carisoprodol (SOMA) 250 MG tablet Take 350 mg by mouth three times daily    Historical Provider, MD   oxyCODONE-acetaminophen (PERCOCET)  MG per tablet every 3-4 hours as needed. . 1/27/16   Historical Provider, MD   propranolol (INDERAL LA) 80 MG CR capsule daily  2/19/16   Historical Provider, MD   CELEBREX 200 MG capsule 200 mg daily. 9/29/14   Historical Provider, MD   cloNIDine (CATAPRES) 0.1 MG tablet Take 0.1 mg by mouth 2 times daily as needed. Historical Provider, MD   promethazine (PHENERGAN) 25 MG tablet Take 25 mg by mouth daily. Historical Provider, MD       Reports compliance with medications as good . Patient reports side effects as follows: none. No evidence of EPS, no cogwheeling or abnormal motor movements. Gait and Station:normal gait and station   Musculoskeletal: WNL    MSE:  Patient is  A & O x3. Appearance: Pleasant, cooperative  woman appearing her reported age. Dressed casually and appropriately for the weather  Cognition:  Recent memory intact , remote memory intact ,   excellent fund of knowledge, average to above average  intelligence level. Speech:  normal  Language: intact  Conversation appropriate context  Behavior:  Cooperative  Mood: euthymic  Affect: congruent with mood  Thought Content: no psychosis. Increased emphasis on recent mite infestation which she is resolving  Thought Process: linear,logicall, goal directed and coherent  Judgement Insight regarding illness[de-identified]  normal and appropriate        Assesment: INNA. PTSD.  Depression        Plan:  Continue diazepam 5 mg po q8h prn anxiety, cymbalta 60 mg po daily, trazodone 100-200 mg po qhs        The risks, benefits, side effects, indications, contraindications, and adverse effects of the medications have been discussed. The pt has verbalized understanding and has capacity to give informed consent. The Franklyn Chavez report has been reviewed according to [facility] regulations. Controlled substance Treatment Plan: this is a maintenance dose. Supportive therapy offered. The patient has been advised to call with any problems.       Follow up: 3 months/prn        Federico Ko MA

## 2019-04-26 ENCOUNTER — HOSPITAL ENCOUNTER (EMERGENCY)
Age: 54
Discharge: HOME | End: 2019-04-26
Payer: MEDICAID

## 2019-04-26 VITALS
SYSTOLIC BLOOD PRESSURE: 143 MMHG | RESPIRATION RATE: 16 BRPM | BODY MASS INDEX: 23.92 KG/M2 | HEIGHT: 62 IN | DIASTOLIC BLOOD PRESSURE: 84 MMHG | OXYGEN SATURATION: 98 % | WEIGHT: 130 LBS | TEMPERATURE: 97.8 F | HEART RATE: 66 BPM

## 2019-04-26 DIAGNOSIS — F22 DELUSIONS OF PARASITOSIS (HCC): Primary | ICD-10-CM

## 2019-04-26 LAB
ALBUMIN SERPL-MCNC: 4.1 G/DL (ref 3.5–5.2)
ALP BLD-CCNC: 70 U/L (ref 35–104)
ALT SERPL-CCNC: 16 U/L (ref 5–33)
AMPHETAMINE SCREEN, URINE: NEGATIVE
ANION GAP SERPL CALCULATED.3IONS-SCNC: 9 MMOL/L (ref 7–19)
AST SERPL-CCNC: 22 U/L (ref 5–32)
BARBITURATE SCREEN URINE: NEGATIVE
BASOPHILS ABSOLUTE: 0 K/UL (ref 0–0.2)
BASOPHILS RELATIVE PERCENT: 0.4 % (ref 0–1)
BENZODIAZEPINE SCREEN, URINE: POSITIVE
BILIRUB SERPL-MCNC: <0.2 MG/DL (ref 0.2–1.2)
BILIRUBIN URINE: NEGATIVE
BLOOD, URINE: NEGATIVE
BUN BLDV-MCNC: 7 MG/DL (ref 6–20)
CALCIUM SERPL-MCNC: 9.5 MG/DL (ref 8.6–10)
CANNABINOID SCREEN URINE: NEGATIVE
CHLORIDE BLD-SCNC: 101 MMOL/L (ref 98–111)
CLARITY: CLEAR
CO2: 30 MMOL/L (ref 22–29)
COCAINE METABOLITE SCREEN URINE: NEGATIVE
COLOR: YELLOW
CREAT SERPL-MCNC: 0.6 MG/DL (ref 0.5–0.9)
EOSINOPHILS ABSOLUTE: 0.1 K/UL (ref 0–0.6)
EOSINOPHILS RELATIVE PERCENT: 2.4 % (ref 0–5)
ETHANOL: <10 MG/DL (ref 0–0.08)
GFR NON-AFRICAN AMERICAN: >60
GLUCOSE BLD-MCNC: 68 MG/DL (ref 74–109)
GLUCOSE URINE: NEGATIVE MG/DL
HCT VFR BLD CALC: 37.8 % (ref 37–47)
HEMOGLOBIN: 12.1 G/DL (ref 12–16)
KETONES, URINE: NEGATIVE MG/DL
LEUKOCYTE ESTERASE, URINE: NEGATIVE
LYMPHOCYTES ABSOLUTE: 2.3 K/UL (ref 1.1–4.5)
LYMPHOCYTES RELATIVE PERCENT: 42 % (ref 20–40)
Lab: ABNORMAL
MAGNESIUM: 2.3 MG/DL (ref 1.6–2.6)
MCH RBC QN AUTO: 31 PG (ref 27–31)
MCHC RBC AUTO-ENTMCNC: 32 G/DL (ref 33–37)
MCV RBC AUTO: 96.9 FL (ref 81–99)
MONOCYTES ABSOLUTE: 0.4 K/UL (ref 0–0.9)
MONOCYTES RELATIVE PERCENT: 7.8 % (ref 0–10)
NEUTROPHILS ABSOLUTE: 2.6 K/UL (ref 1.5–7.5)
NEUTROPHILS RELATIVE PERCENT: 47.2 % (ref 50–65)
NITRITE, URINE: NEGATIVE
OPIATE SCREEN URINE: NEGATIVE
PDW BLD-RTO: 12.3 % (ref 11.5–14.5)
PH UA: 5.5 (ref 5–8)
PLATELET # BLD: 191 K/UL (ref 130–400)
PMV BLD AUTO: 10.3 FL (ref 9.4–12.3)
POTASSIUM REFLEX MAGNESIUM: 3.5 MMOL/L (ref 3.5–5)
PROTEIN UA: NEGATIVE MG/DL
RBC # BLD: 3.9 M/UL (ref 4.2–5.4)
SODIUM BLD-SCNC: 140 MMOL/L (ref 136–145)
SPECIFIC GRAVITY UA: 1.01 (ref 1–1.03)
TOTAL PROTEIN: 7.2 G/DL (ref 6.6–8.7)
TSH SERPL DL<=0.05 MIU/L-ACNC: 2.71 UIU/ML (ref 0.27–4.2)
URINE REFLEX TO CULTURE: NORMAL
UROBILINOGEN, URINE: 0.2 E.U./DL
WBC # BLD: 5.4 K/UL (ref 4.8–10.8)

## 2019-04-26 PROCEDURE — 80053 COMPREHEN METABOLIC PANEL: CPT

## 2019-04-26 PROCEDURE — 84443 ASSAY THYROID STIM HORMONE: CPT

## 2019-04-26 PROCEDURE — 99284 EMERGENCY DEPT VISIT MOD MDM: CPT | Performed by: EMERGENCY MEDICINE

## 2019-04-26 PROCEDURE — 81003 URINALYSIS AUTO W/O SCOPE: CPT

## 2019-04-26 PROCEDURE — 83735 ASSAY OF MAGNESIUM: CPT

## 2019-04-26 PROCEDURE — 36415 COLL VENOUS BLD VENIPUNCTURE: CPT

## 2019-04-26 PROCEDURE — G0480 DRUG TEST DEF 1-7 CLASSES: HCPCS

## 2019-04-26 PROCEDURE — 99283 EMERGENCY DEPT VISIT LOW MDM: CPT

## 2019-04-26 PROCEDURE — 80307 DRUG TEST PRSMV CHEM ANLYZR: CPT

## 2019-04-26 PROCEDURE — 85025 COMPLETE CBC W/AUTO DIFF WBC: CPT

## 2019-04-26 ASSESSMENT — ENCOUNTER SYMPTOMS
SHORTNESS OF BREATH: 0
BACK PAIN: 0
WHEEZING: 0
COUGH: 0
DIARRHEA: 0
VOMITING: 0
ABDOMINAL PAIN: 0
NAUSEA: 0
EYE DISCHARGE: 0
SORE THROAT: 0

## 2019-04-26 NOTE — ED PROVIDER NOTES
Attending Supervisory Note/Shared Visit   I have personally performed a face to face diagnostic evaluation on this patient. I have reviewed the mid-levels findings and agree. Briefly, patient presents the ED for evaluation regarding bugs on her skin. She's been here to the ED previously for similar. Patient denies any substance abuse. On my exam: Patient is alert and speaking. She is in no respiratory distress. I do not appreciate any rash over her face or neck or torso area. She does show me these items in a plastic bag however they do not appear like an insect. Patient has been seen and evaluated by mental health professional and after discussion with on-call psychiatry, Dr. Georgia Villareal, patient was cleared for discharge with outpatient follow-up. She was offered inpatient admission to the behavioral health unit if she was voluntary however patient states that she is not interested in this course of action. FINAL IMPRESSION      1.  Del[facility]osis ([facility]          Sheryl Ramos MD  Attending Emergency Physician       Sheryl Ramos MD  04/26/19 2071
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
## 2019-04-26 NOTE — ED PROVIDER NOTES
[facility] DE[facility]  eMERGENCYdEPARTMENT eNCOUnter      Pt Name: Emry Denver  MRN: [id number]  [place] 1965  Date of evaluation: 4/26/2019  Provider:ALESSANDRA Rodriges    CHIEF COMPLAINT       Chief Complaint   Patient presents with    Pruritis     12 weeks. pt believes she has bug bites. pt being treated for thrush. HISTORY OF PRESENT ILLNESS  (Location/Symptom, Timing/Onset, Context/Setting, Quality, Duration, Modifying Factors, Severity.)   Emry Denver is a 48 y.o. female who presents to the emergency department with chief complaint of skin irritation. She reports for 12 weeks she feels that bugs have been crawling out of her hair irritating her skin. She reports seeing bugs crawl on her arms as well. Patient denies any rash. Patient was seen and evaluated on 30 March for the same chief complaint. She was diagnosed with delusional parasitosis. She was evaluated by psychiatry. The history is provided by the patient. Nursing Notes were reviewed and I agree. REVIEW OF SYSTEMS    (2-9 systems for level 4, 10 or more for level 5)     Review of Systems   Constitutional: Negative for chills and fever. HENT: Negative for congestion, ear pain and sore throat. Eyes: Negative for discharge. Respiratory: Negative for cough, shortness of breath and wheezing. Cardiovascular: Negative for chest pain and palpitations. Gastrointestinal: Negative for abdominal pain, diarrhea, nausea and vomiting. Genitourinary: Negative for dysuria, frequency, hematuria and urgency. Musculoskeletal: Positive for arthralgias and myalgias. Negative for back pain and neck pain. Skin: Positive for rash. Neurological: Negative for dizziness and headaches. Psychiatric/Behavioral: Positive for hallucinations. All other systems reviewed and are negative. Except as noted above the remainder of the review of systems was reviewed and negative.        PAST MEDICAL HISTORY     Past Medical History:   Diagnosis Date    Anxiety     Arthritis     Cervical spinal stenosis     Chest pain     COPD (chronic obstructive pulmonary disease) (HCC)     Depression     Dysphagia     Fibromyalgia     GERD (gastroesophageal reflux disease)     Headache(784.0)     migraines    Hip pain     Hypertension     Lung nodule seen on imaging study Oct 23    8mm on lower right lobe, yet to be investigated [facility]     Narcotic dependence ([facility] [address] 2011    Treated in rehab in past    Neck pain     Nicotine dependence     Urgency-frequency syndrome     UTI (urinary tract infection)          SURGICAL HISTORY       Past Surgical History:   Procedure Laterality Date    CERVICAL SPINE SURGERY       SECTION      CHOLECYSTECTOMY           CURRENT MEDICATIONS       Discharge Medication List as of 2019  4:54 AM      CONTINUE these medications which have NOT CHANGED    Details   nystatin (MYCOSTATIN) 154515 UNIT/ML suspension Take 100,000 Units by mouth 4 times daily, Oral, 4 TIMES DAILY, Historical Med      diazepam (VALIUM) 5 MG tablet Take 1 tablet by mouth every 8 hours as needed for Anxiety for up to 30 days. Last obtained 19. Do not renew before 30 days of last script, Disp-60 tablet, R-0Normal      traZODone (DESYREL) 100 MG tablet TAKE ONE TO TWO TABLETS BY MOUTH AT BEDTIME AS NEEDED FOR INSOMNIA, Disp-60 tablet, R-2Normal      DULoxetine (CYMBALTA) 60 MG extended release capsule TAKE 1 CAPSULE BY MOUTH EVERY DAY, Disp-30 capsule, R-2Normal      gabapentin (NEURONTIN) 300 MG capsule TAKE 3 CAPSULES BY MOUTH EVERY 8 HOURS., Disp-90 capsule, R-0Print      carisoprodol (SOMA) 250 MG tablet Take 350 mg by mouth three times daily      oxyCODONE-acetaminophen (PERCOCET)  MG per tablet every 3-4 hours as needed. ., R-0Historical Med      propranolol (INDERAL LA) 80 MG CR capsule daily , R-6      CELEBREX 200 MG capsule 200 mg daily.              ALLERGIES     Morphine and Sulfa antibiotics    FAMILY HISTORY       Family History   Problem Relation Age of Onset    Colon Polyps Father     Lung Cancer Father     Heart Disease Father         stents    Depression Father     Colon Polyps Paternal Grandmother     Colon Cancer Paternal Grandmother     Depression Sister     Esophageal Cancer Neg Hx     Liver Cancer Neg Hx     Stomach Cancer Neg Hx           SOCIAL HISTORY       Social History     Socioeconomic History    Marital status:      Spouse name: None    Number of children: None    Years of education: None    Highest education level: None   Occupational History    None   Social Needs    Financial resource strain: None    Food insecurity:     Worry: None     Inability: None    Transportation needs:     Medical: None     Non-medical: None   Tobacco Use    Smoking status: Former Smoker     Packs/day: 1.50     Last attempt to quit: 2016     Years since quittin.4    Smokeless tobacco: Never Used    Tobacco comment: pt is using ecigs   Substance and Sexual Activity    Alcohol use: No    Drug use: No    Sexual activity: None   Lifestyle    Physical activity:     Days per week: None     Minutes per session: None    Stress: None   Relationships    Social connections:     Talks on phone: None     Gets together: None     Attends Latter-day service: None     Active member of club or organization: None     Attends meetings of clubs or organizations: None     Relationship status: None    Intimate partner violence:     Fear of current or ex partner: None     Emotionally abused: None     Physically abused: None     Forced sexual activity: None   Other Topics Concern    None   Social History Narrative    None       SCREENINGS           PHYSICAL EXAM    (up to 7 forlevel 4, 8 or more for level 5)     ED Triage Vitals [19 0140]   BP Temp Temp Source Pulse Resp SpO2 Height Weight   (!) 143/84 97.8 °F (36.6 °C) Oral 66 16 98 % 5' 2\" (1.575 m) 130 lb (59 kg) Physical Exam   Constitutional: She is oriented to person, place, and time. She appears well-developed and well-nourished. No distress. HENT:   Head: Normocephalic. Mouth/Throat: No oropharyngeal exudate. Eyes: Pupils are equal, round, and reactive to light. EOM are normal. Right eye exhibits no discharge. Left eye exhibits no discharge. No scleral icterus. Neck: Normal range of motion. No tracheal deviation present. Cardiovascular: Normal rate, regular rhythm and normal heart sounds. No murmur heard. Pulmonary/Chest: Effort normal and breath sounds normal. No stridor. No respiratory distress. She has no wheezes. Abdominal: Soft. Bowel sounds are normal. She exhibits no distension. There is no tenderness. Musculoskeletal: Normal range of motion. Lymphadenopathy:     She has no cervical adenopathy. Neurological: She is alert and oriented to person, place, and time. Skin: Skin is warm and dry. Capillary refill takes less than 2 seconds. No rash noted. She is not diaphoretic. No erythema. No pallor. Psychiatric: She has a normal mood and affect. Her speech is normal and behavior is normal. Thought content is paranoid and delusional. Cognition and memory are normal.   Patient is convinced she has bugs crawling out of her hair. I did not appreciate any insects on the patient's scalp and I have examined the hair thoroughly. She does not have any bugs on her arms, he claims to have insects crawling on her. The patient denies any suicidal or homicidal ideations. I do not see any evidence of any self injury or harm. Nursing note and vitals reviewed.         DIAGNOSTIC RESULTS     RADIOLOGY:   Non-plain film images such as CT, Ultrasound and MRI are read by the radiologist. Plain radiographic images are visualized and preliminarilyinterpreted by No att. providers found with the below findings:        Interpretation per the Radiologist below, if available at the time of this note:    No orders to display       LABS:  Labs Reviewed   CBC WITH AUTO DIFFERENTIAL - Abnormal; Notable for the following components:       Result Value    RBC 3.90 (*)     MCHC 32.0 (*)     Neutrophils % 47.2 (*)     Lymphocytes % 42.0 (*)     All other components within normal limits   COMPREHENSIVE METABOLIC PANEL W/ REFLEX TO MG FOR LOW K - Abnormal; Notable for the following components:    CO2 30 (*)     Glucose 68 (*)     All other components within normal limits   URINE DRUG SCREEN - Abnormal; Notable for the following components:    Benzodiazepine Screen, Urine Positive (*)     All other components within normal limits   URINE RT REFLEX TO CULTURE   ETHANOL   TSH WITHOUT REFLEX   MAGNESIUM       All other labs were within normal range or notreturned as of this dictation. RE-ASSESSMENT          EMERGENCY DEPARTMENT COURSE and DIFFERENTIAL DIAGNOSIS/MDM:   Vitals:    Vitals:    04/26/19 0140   BP: (!) 143/84   Pulse: 66   Resp: 16   Temp: 97.8 °F (36.6 °C)   TempSrc: Oral   SpO2: 98%   Weight: 130 lb (59 kg)   Height: 5' 2\" (1.575 m)         MDM  Number of Diagnoses or Management Options  Delusions of parasitosis [facility]):   Diagnosis management comments: Patient presents to the ED with concerns of an insect infestation. No obvious signs of bugs. I am going to obtain basic labs and have the patient evaluated by psychiatry, as she does have a psych history. Care will be transferred to the ED attending, Dr. Daneil Libman, and he will have the patient evaluated and disposition the patient accordingly. PROCEDURES:    Procedures      FINAL IMPRESSION      1.  Delusions of parasitosis [facility])          DISPOSITION/PLAN   DISPOSITION Decision To Discharge 04/26/2019 04:54:12 AM      PATIENT REFERRED TO:  Jaylin Shultz DO  [address] [facility] [address] [facility] [address]  [phone]            DISCHARGE MEDICATIONS:  Discharge Medication List as of 4/26/2019  4:54 AM          (Please note that portions of this note were completed with a voice recognition program.  Efforts were made to edit the dictations but occasionallywords are mis-transcribed.)    ALESSANDRA Jaimes APRN  04/26/19 0080

## 2019-04-26 NOTE — ED NOTES
Assessment:    Pt respirations even and unlabored, skin color within normal limits. Skin is warm and dry. Capillary refill less than 2 seconds. Alert and oriented x 4. Pt is in no acute distress. Pt upper left forearm red. No edema noted to extremities. Pt believes bugs are crawling all over her. No bugs seen.         Gerson Dubon, RN  04/26/19 4140

## 2019-05-23 DIAGNOSIS — F33.9 EPISODE OF RECURRENT MAJOR DEPRESSIVE DISORDER, UNSPECIFIED DEPRESSION EPISODE SEVERITY (HCC): ICD-10-CM

## 2019-05-23 NOTE — TELEPHONE ENCOUNTER
Last OV:  02.14.19    Next OV:   05.29.19    Requested Prescriptions     Pending Prescriptions Disp Refills    DULoxetine (CYMBALTA) 60 MG extended release capsule 30 capsule 2     Sig: TAKE 1 CAPSULE BY MOUTH EVERY DAY     5/23/2019 3:58 PM   Progress Note        Lidia [place] 1965  Psychotherapy Time Spent: 25 min      Psychotherapy Topics: health    PCP IS:        Subjective:  Patient is a 48year old  woman diagnosed with anxiety, depression, chronic pain and presents today for follow-up. Last seen in clinic on 12/14 and prior records were reviewed. History obtained via chart review and patient    CHIEF COMPLAINT: anxiety    Today patient states, Dog had contracted cheyliteilosis mite, dog passed it on to the patient. She saw her doctor who gave her 12 mg iv Ivermec  And instructions to repeat in one week. She went to a dermatologist who gave her permethrin, \"it still felt like bugs all over me,\" she saw another doctor who did scrapings and determined she is clear of this mite. Patien treated herself aggressively for the mite. \"My fingertips are sensitive. If a piece of dust lands on my face it feels like it is back. \" She reports that she  Is staying with her father. Reports the diazepam helps. She has been anxious her whole life. SUBSTANCE USE/ABUSE:   Tobacco: denied   Alcohol: denied   Marijuana: denied   Street/recreational drugs: denied    DANGEROUSNESS:   Suicide ideation: denied   Homicidal ideation/dangerousness to others: denied        Review of Systems - 14 point review negative today for back pain and subjective feelings above described        Current Meds:    Prior to Admission medications    Medication Sig Start Date End Date Taking?  Authorizing Provider   nystatin (MYCOSTATIN) [id number] UNIT/ML suspension Take 100,000 Units by mouth 4 times daily    Historical Provider, MD   diazepam (VALIUM) 5 MG tablet Take 1 tablet by mouth every 8 hours as needed for Anxiety appropriate        Assesment: [facility]. PTSD. Depression        Plan:  Continue diazepam 5 mg po q8h prn anxiety, cymbalta 60 mg po daily, trazodone 100-200 mg po qhs        The risks, benefits, side effects, indications, contraindications, and adverse effects of the medications have been discussed. The pt has verbalized understanding and has capacity to give informed consent. The Zak Verdechance report has been reviewed according to [facility] regulations. Controlled substance Treatment Plan: this is a maintenance dose. Supportive therapy offered. The patient has been advised to call with any problems.       Follow up: 3 months/prn        Jordy López MA

## 2019-05-24 RX ORDER — DULOXETIN HYDROCHLORIDE 60 MG/1
CAPSULE, DELAYED RELEASE ORAL
Qty: 30 CAPSULE | Refills: 2 | Status: SHIPPED | OUTPATIENT
Start: 2019-05-24 | End: 2019-07-18 | Stop reason: SDUPTHER

## 2019-05-24 NOTE — TELEPHONE ENCOUNTER
Sent to pharmacy as: DULoxetine HCl 60 MG Oral Capsule Delayed Release Particles    E-Prescribing Status: Receipt confirmed by pharmacy (5/24/2019  8:14 AM CDT)

## 2019-06-04 ENCOUNTER — OFFICE VISIT (OUTPATIENT)
Dept: PSYCHIATRY | Age: 54
End: 2019-06-04
Payer: MEDICAID

## 2019-06-04 VITALS
OXYGEN SATURATION: 98 % | WEIGHT: 124 LBS | BODY MASS INDEX: 22.82 KG/M2 | DIASTOLIC BLOOD PRESSURE: 83 MMHG | HEIGHT: 62 IN | HEART RATE: 68 BPM | SYSTOLIC BLOOD PRESSURE: 133 MMHG

## 2019-06-04 DIAGNOSIS — F41.1 GENERALIZED ANXIETY DISORDER WITH PANIC ATTACKS: ICD-10-CM

## 2019-06-04 DIAGNOSIS — F33.0 MILD EPISODE OF RECURRENT MAJOR DEPRESSIVE DISORDER (HCC): Primary | ICD-10-CM

## 2019-06-04 DIAGNOSIS — G47.00 INSOMNIA, UNSPECIFIED TYPE: ICD-10-CM

## 2019-06-04 DIAGNOSIS — F41.0 GENERALIZED ANXIETY DISORDER WITH PANIC ATTACKS: ICD-10-CM

## 2019-06-04 PROCEDURE — 99214 OFFICE O/P EST MOD 30 MIN: CPT | Performed by: NURSE PRACTITIONER

## 2019-06-04 RX ORDER — DIAZEPAM 5 MG/1
5 TABLET ORAL DAILY PRN
Qty: 15 TABLET | Refills: 0
Start: 2019-06-04 | End: 2019-07-04

## 2019-06-04 RX ORDER — TRAZODONE HYDROCHLORIDE 100 MG/1
TABLET ORAL
Qty: 30 TABLET | Refills: 3
Start: 2019-06-04 | End: 2019-07-10 | Stop reason: SDUPTHER

## 2019-06-04 NOTE — PATIENT INSTRUCTIONS
Plan:  Continue:  Trazodone, 100mg, nightly as needed for sleep  Cymbalta, 60mg, daily (with the evening meal)    Taper:  Diazepam, 5mg, daily as needed for anxiety (15 pills prescribed)    Follow up: Return in about 3 months (around 9/4/2019).

## 2019-06-04 NOTE — PROGRESS NOTES
6/4/2019 1:40 PM   Progress Note    IN:  1301  OUT: [address] 1965      Chief Complaint   Patient presents with    Anxiety         Subjective:  Patient is a 48 y.o. female diagnosed with INNA/PTSD/Depression and presents today for follow-up. Last seen in clinic on 2/14/19 with Lady Joseph and prior records were reviewed. Today patient states, \"I feel good today. I have had no Valium today. I have the Valium down to an as needed basis. \" She says that she only takes Valium when she feels a panic attack coming. She says that she is taking Trazodone for sleep. She says that she has set her mind to getting off of Valium. She says that she is having panic attacks a couple of times a week. Patient reports side effects as follows: none. No evidence of EPS, no cogwheeling or abnormal motor movements. Absent  suicidal ideation. Reports compliance with medications as good .      PRIOR MED TRIALS  Buspirone (did nothing)  Xanax (didn't work)  Valium  Ativan (put her in another world, forgetfulness)  Prozac (increased anxiety)  Cymbalta (current, working well at 60mg)  Zoloft (doesn't remember)  Lexapro (doesn't remember)  Amitriptyline (long time ago, put her in another world)  Trazodone (current, 100mg)    Vehemently doesn't want to be on Clonazepam    Current Substance Use:  See history    BP: /83 (Site: Right Upper Arm, Position: Sitting, Cuff Size: Medium Adult)   Pulse 68   Ht 5' 2\" (1.575 m)   Wt 124 lb (56.2 kg)   SpO2 98%   BMI 22.68 kg/m²       Review of Systems - 14 point review:  Negative except for: insomnia, anxiety, depression    Constitutional: (fevers, chills, night sweats, wt loss/gain, change in appetite, fatigue, somnolence)    HEENT: (ear pain or discharge, hearing loss, ear ringing, sinus pressure, nosebleed, nasal discharge, sore throat, oral sores, tooth pain, bleeding gums, hoarse voice, neck pain)      Cardiovascular: (HTN, chest pain, elevated cholesterol/lipids, palpitations, leg swelling, leg pain with walking)    Respiratory: (cough, wheezing, snoring, SOB with activity (dyspnea), SOB while lying flat (orthopnea), awakening with severe SOB (paroxysmal nocturnal dyspnea))    Gastrointestinal: (NVD, constipation, abdominal pain, bright red stools, black tarry stools, stool incontinence)     Genitourinary:  (pelvic pain, burning or frequency of urination, urinary urgency, blood in urine incomplete bladder emptying, urinary incontinence, STD; MEN: testicular pain or swelling, erectile dysfunction; WOMEN: LMP, heavy menstrual bleeding (menorrhagia), irregular periods, postmenopausal bleeding, menstrual pain (dymenorrhea, vaginal discharge)    Musculoskeletal: (bone pain/fracture, joint pain or swelling, musle pain)    Integumentary: (rashes, acne, non-healing sores, itching, breast lumps, breast pain, nipple discharge, hair loss)    Neurologic: (HA, muscle weakness, paresthesias (numbness, coldness, crawling or prickling), memory loss, seizure, dizziness)    Psychiatric:  (anxiety, sadness, irritability/anger, insomnia, suicidality)    Endocrine: (heat or cold intolerance, excessive thirst (polydipsia), excessive hunger (polyphagia))    Immune/Allergic: (hives, seasonal or environmental allergies, HIV exposure)    Hematologic/Lymphatic: (lymph node enlargement, easy bleeding or bruising)    History obtained via chart review and patient    PCP is Mark Trinh, DO       Current Meds:    Prior to Admission medications    Medication Sig Start Date End Date Taking? Authorizing Provider   traZODone (DESYREL) 100 MG tablet TAKE One TABLETS BY MOUTH AT BEDTIME AS NEEDED FOR INSOMNIA 6/4/19  Yes Molly Begun, APRN - NP   diazepam (VALIUM) 5 MG tablet Take 1 tablet by mouth daily as needed for Anxiety or Sleep for up to 30 days.  6/4/19 7/4/19 Yes Molly Begun, APRN - NP   DULoxetine (CYMBALTA) 60 MG extended release capsule TAKE 1 CAPSULE BY MOUTH EVERY DAY 5/24/19 22 04/26/2019    ALT 16 04/26/2019    LABGLOM >60 04/26/2019     Lab Results   Component Value Date     04/26/2019    K 3.5 04/26/2019     04/26/2019    CO2 30 04/26/2019    BUN 7 04/26/2019    CREATININE 0.6 04/26/2019    GLUCOSE 68 04/26/2019    CALCIUM 9.5 04/26/2019      No results found for: CHOL  No results found for: TRIG  No results found for: HDL  No results found for: LDLCHOLESTEROL, LDLCALC  No results found for: LABVLDL, VLDL  No results found for: CHOLHDLRATIO  No results found for: LABA1C  No results found for: EAG  Lab Results   Component Value Date    TSH 2.710 04/26/2019     No results found for: VITD25    Assessments Administered:    PHQ9: 8, mild  HAM-A: 16, mild    Assessment:   1. Mild episode of recurrent major depressive disorder ([facility] [address]    2. Insomnia, unspecified type    3. Generalized anxiety disorder with panic attacks        Plan:  Continue:  Trazodone, 100mg, nightly as needed for sleep  Cymbalta, 60mg, daily (with the evening meal)    Taper:  Diazepam, 5mg, daily as needed for anxiety (15 pills prescribed)    Follow up: Return in about 3 months (around 9/4/2019). 1. The risks, benefits, side effects, indications, contraindications, and adverse effects of the medications have been discussed. Yes.  2. The pt has verbalized understanding and has capacity to give informed consent. 3. The Franklyn Chavez report has been reviewed according to [facility] regulations. 4. Supportive therapy offered. 5. Follow up: Return in about 3 months (around 9/4/2019). 6. The patient has been advised to call with any problems. 7. Controlled substance Treatment Plan: this is a tapering dose. .  8. The above listed medications have been continued, modifications in meds and other orders/labs as follows:      Orders Placed This Encounter   Medications    traZODone (DESYREL) 100 MG tablet     Sig: TAKE One TABLETS BY MOUTH AT BEDTIME AS NEEDED FOR INSOMNIA     Dispense:  30 tablet     Refill:  3     Please

## 2019-07-09 ENCOUNTER — TELEPHONE (OUTPATIENT)
Dept: PSYCHIATRY | Age: 54
End: 2019-07-09

## 2019-07-09 DIAGNOSIS — F41.9 ANXIETY: Primary | ICD-10-CM

## 2019-07-10 ENCOUNTER — FOLLOWUP TELEPHONE ENCOUNTER (OUTPATIENT)
Dept: PSYCHIATRY | Age: 54
End: 2019-07-10

## 2019-07-10 DIAGNOSIS — G47.00 INSOMNIA, UNSPECIFIED TYPE: ICD-10-CM

## 2019-07-10 RX ORDER — TRAZODONE HYDROCHLORIDE 100 MG/1
TABLET ORAL
Qty: 30 TABLET | Refills: 3 | Status: SHIPPED | OUTPATIENT
Start: 2019-07-10 | End: 2019-08-06 | Stop reason: SDUPTHER

## 2019-07-10 RX ORDER — DIAZEPAM 5 MG/1
5 TABLET ORAL 2 TIMES DAILY PRN
Qty: 60 TABLET | Refills: 0 | Status: SHIPPED | OUTPATIENT
Start: 2019-07-10 | End: 2019-08-21 | Stop reason: SDUPTHER

## 2019-07-18 ENCOUNTER — TELEPHONE (OUTPATIENT)
Dept: PSYCHIATRY | Age: 54
End: 2019-07-18

## 2019-07-18 DIAGNOSIS — F33.9 EPISODE OF RECURRENT MAJOR DEPRESSIVE DISORDER, UNSPECIFIED DEPRESSION EPISODE SEVERITY (HCC): ICD-10-CM

## 2019-07-18 RX ORDER — DULOXETIN HYDROCHLORIDE 60 MG/1
CAPSULE, DELAYED RELEASE ORAL
Qty: 90 CAPSULE | Refills: 0 | Status: SHIPPED | OUTPATIENT
Start: 2019-07-18 | End: 2019-10-11 | Stop reason: SDUPTHER

## 2019-07-19 ENCOUNTER — TELEPHONE (OUTPATIENT)
Dept: PSYCHIATRY | Age: 54
End: 2019-07-19

## 2019-08-06 DIAGNOSIS — G47.00 INSOMNIA, UNSPECIFIED TYPE: ICD-10-CM

## 2019-08-07 ENCOUNTER — TELEPHONE (OUTPATIENT)
Dept: PSYCHIATRY | Age: 54
End: 2019-08-07

## 2019-08-07 RX ORDER — TRAZODONE HYDROCHLORIDE 100 MG/1
TABLET ORAL
Qty: 90 TABLET | Refills: 0 | Status: SHIPPED | OUTPATIENT
Start: 2019-08-07 | End: 2019-10-11 | Stop reason: SDUPTHER

## 2019-08-19 ENCOUNTER — TELEPHONE (OUTPATIENT)
Dept: PSYCHIATRY | Age: 54
End: 2019-08-19

## 2019-08-20 DIAGNOSIS — F41.9 ANXIETY: ICD-10-CM

## 2019-08-20 NOTE — TELEPHONE ENCOUNTER
Yvon Leo called to request a refill on her medication. Copy of Kleber under Media  Last office visit : 6/4/2019   Next office visit : 9/4/2019     Requested Prescriptions     Pending Prescriptions Disp Refills    diazepam (VALIUM) 5 MG tablet 60 tablet 0     Sig: Take 1 tablet by mouth 2 times daily as needed for Anxiety or Sleep for up to 10 days. Cristina Ponce RNOffice Visit     6/4/2019  [address] [facility]   ALESSANDRA Sewell - FAISAL   Nurse Practitioner Psychiatric/Mental Health   Mild episode of recurrent major depressive disorder [facility]) +2 more   Dx   Anxiety   Reason for Visit    Progress Notes     Expand All Collapse All    6/4/2019 1:40 PM                               Progress Note     IN:  1301  OUT: 1330        Yvon Lizarragao 1965                        Chief Complaint   Patient presents with    Anxiety            Subjective:  Patient is a 48 y.o. female diagnosed with INNA/PTSD/Depression and presents today for follow-up. Last seen in clinic on 2/14/19 with Elijah Lewis and prior records were reviewed.     Today patient states, \"I feel good today. I have had no Valium today. I have the Valium down to an as needed basis. \" She says that she only takes Valium when she feels a panic attack coming. She says that she is taking Trazodone for sleep. She says that she has set her mind to getting off of Valium. She says that she is having panic attacks a couple of times a week.     Patient reports side effects as follows: none. No evidence of EPS, no cogwheeling or abnormal motor movements. Absent  suicidal ideation.   Reports compliance with medications as good .      PRIOR MED TRIALS  Buspirone (did nothing)  Xanax (didn't work)  Valium  Ativan (put her in another world, forgetfulness)  Prozac (increased anxiety)  Cymbalta (current, working well at 60mg)  Zoloft (doesn't remember)  Lexapro (doesn't remember)  Amitriptyline (long time ago, put her in another world)  Trazodone (current, 100mg)     Vehemently doesn't want to be on Clonazepam     Current Substance Use:  See history     BP: /83 (Site: Right Upper Arm, Position: Sitting, Cuff Size: Medium Adult)   Pulse 68   Ht 5' 2\" (1.575 m)   Wt 124 lb (56.2 kg)   SpO2 98%   BMI 22.68 kg/m²         Review of Systems - 14 point review:  Negative except for: insomnia, anxiety, depression     Constitutional: (fevers, chills, night sweats, wt loss/gain, change in appetite, fatigue, somnolence)     HEENT: (ear pain or discharge, hearing loss, ear ringing, sinus pressure, nosebleed, nasal discharge, sore throat, oral sores, tooth pain, bleeding gums, hoarse voice, neck pain)      Cardiovascular: (HTN, chest pain, elevated cholesterol/lipids, palpitations, leg swelling, leg pain with walking)     Respiratory: (cough, wheezing, snoring, SOB with activity (dyspnea), SOB while lying flat (orthopnea), awakening with severe SOB (paroxysmal nocturnal dyspnea))     Gastrointestinal: (NVD, constipation, abdominal pain, bright red stools, black tarry stools, stool incontinence)     Genitourinary:  (pelvic pain, burning or frequency of urination, urinary urgency, blood in urine incomplete bladder emptying, urinary incontinence, STD; MEN: testicular pain or swelling, erectile dysfunction; WOMEN: LMP, heavy menstrual bleeding (menorrhagia), irregular periods, postmenopausal bleeding, menstrual pain (dymenorrhea, vaginal discharge)     Musculoskeletal: (bone pain/fracture, joint pain or swelling, musle pain)     Integumentary: (rashes, acne, non-healing sores, itching, breast lumps, breast pain, nipple discharge, hair loss)     Neurologic: (HA, muscle weakness, paresthesias (numbness, coldness, crawling or prickling), memory loss, seizure, dizziness)     Psychiatric:  (anxiety, sadness, irritability/anger, insomnia, suicidality)     Endocrine: (heat or cold intolerance, excessive thirst (polydipsia), excessive hunger daily (with the evening meal)     Taper:  Diazepam, 5mg, daily as needed for anxiety (15 pills prescribed)     Follow up: Return in about 3 months (around 9/4/2019).            After Visit Summary (Automatic SnapShot taken 6/4/2019)   Additional Documentation     Vitals:    /83 (Site: Right Upper Arm, Position: Sitting, Cuff Size: Medium Adult)    Pulse 68    Ht 5' 2\" (1.575 m)    Wt 124 lb (56.2 kg)    SpO2 98%    BMI 22.68 kg/m²    BSA 1.57 m²    Flowsheets:    Vitals Reassessment,    Calorie Assessment       Encounter Info:    Billing Info,    Allergies,    Detailed Report,    History,    Medications,    Questionnaires       Chart Review Routing History     No Routing History on File   Encounter Status     Closed by ALESSANDRA Humphries NP on 6/4/19 at 13:40   Orders Placed      None   Medication Changes         diazePAM 5 mg Oral DAILY PRN       traZODone HCl 100 MG TAKE One TABLETS BY MOUTH AT BEDTIME AS NEEDED FOR INSOMNIA       Medication List    Visit Diagnoses         Mild episode of recurrent major depressive disorder (HCC)      Insomnia, unspecified type      Generalized anxiety disorder with panic attacks      Problem List

## 2019-08-21 ENCOUNTER — TELEPHONE (OUTPATIENT)
Dept: PSYCHIATRY | Age: 54
End: 2019-08-21

## 2019-08-21 RX ORDER — DIAZEPAM 5 MG/1
5 TABLET ORAL 2 TIMES DAILY PRN
Qty: 60 TABLET | Refills: 0 | Status: SHIPPED | OUTPATIENT
Start: 2019-08-21 | End: 2019-10-14 | Stop reason: SDUPTHER

## 2019-08-29 ENCOUNTER — TRANSCRIBE ORDERS (OUTPATIENT)
Dept: ADMINISTRATIVE | Facility: HOSPITAL | Age: 54
End: 2019-08-29

## 2019-08-29 DIAGNOSIS — E04.1 THYROID NODULE: ICD-10-CM

## 2019-08-29 DIAGNOSIS — R59.0 CERVICAL ADENOPATHY: Primary | ICD-10-CM

## 2019-09-04 ENCOUNTER — APPOINTMENT (OUTPATIENT)
Dept: CT IMAGING | Facility: HOSPITAL | Age: 54
End: 2019-09-04

## 2019-09-04 ENCOUNTER — APPOINTMENT (OUTPATIENT)
Dept: ULTRASOUND IMAGING | Facility: HOSPITAL | Age: 54
End: 2019-09-04

## 2019-09-05 ENCOUNTER — APPOINTMENT (OUTPATIENT)
Dept: ULTRASOUND IMAGING | Facility: HOSPITAL | Age: 54
End: 2019-09-05

## 2019-09-05 ENCOUNTER — APPOINTMENT (OUTPATIENT)
Dept: CT IMAGING | Facility: HOSPITAL | Age: 54
End: 2019-09-05

## 2019-09-19 ENCOUNTER — APPOINTMENT (OUTPATIENT)
Dept: ULTRASOUND IMAGING | Facility: HOSPITAL | Age: 54
End: 2019-09-19

## 2019-09-19 ENCOUNTER — APPOINTMENT (OUTPATIENT)
Dept: CT IMAGING | Facility: HOSPITAL | Age: 54
End: 2019-09-19

## 2019-09-24 ENCOUNTER — HOSPITAL ENCOUNTER (OUTPATIENT)
Dept: ULTRASOUND IMAGING | Facility: HOSPITAL | Age: 54
Discharge: HOME | End: 2019-09-24
Admitting: INTERNAL MEDICINE

## 2019-09-24 ENCOUNTER — HOSPITAL ENCOUNTER (OUTPATIENT)
Dept: CT IMAGING | Facility: HOSPITAL | Age: 54
Discharge: HOME | End: 2019-09-24

## 2019-09-24 PROCEDURE — 25010000002 IOPAMIDOL 61 % SOLUTION: Performed by: INTERNAL MEDICINE

## 2019-09-24 PROCEDURE — 76536 US EXAM OF HEAD AND NECK: CPT

## 2019-09-24 PROCEDURE — 70491 CT SOFT TISSUE NECK W/DYE: CPT

## 2019-09-24 PROCEDURE — 82565 ASSAY OF CREATININE: CPT

## 2019-09-24 RX ADMIN — IOPAMIDOL 100 ML: 612 INJECTION, SOLUTION INTRAVENOUS at 16:02

## 2019-09-25 LAB — CREAT BLDA-MCNC: 0.6 MG/DL (ref 0.6–1.3)

## 2019-10-10 DIAGNOSIS — G47.00 INSOMNIA, UNSPECIFIED TYPE: ICD-10-CM

## 2019-10-10 DIAGNOSIS — F33.9 EPISODE OF RECURRENT MAJOR DEPRESSIVE DISORDER, UNSPECIFIED DEPRESSION EPISODE SEVERITY (HCC): ICD-10-CM

## 2019-10-11 RX ORDER — TRAZODONE HYDROCHLORIDE 100 MG/1
TABLET ORAL
Qty: 90 TABLET | Refills: 0 | Status: SHIPPED | OUTPATIENT
Start: 2019-10-11 | End: 2020-01-02 | Stop reason: SDUPTHER

## 2019-10-11 RX ORDER — DULOXETIN HYDROCHLORIDE 60 MG/1
CAPSULE, DELAYED RELEASE ORAL
Qty: 90 CAPSULE | Refills: 0 | Status: SHIPPED | OUTPATIENT
Start: 2019-10-11 | End: 2020-01-02 | Stop reason: SDUPTHER

## 2019-10-14 ENCOUNTER — TELEPHONE (OUTPATIENT)
Dept: PSYCHIATRY | Age: 54
End: 2019-10-14

## 2019-10-14 DIAGNOSIS — F41.9 ANXIETY: ICD-10-CM

## 2019-10-14 RX ORDER — DIAZEPAM 5 MG/1
5 TABLET ORAL 2 TIMES DAILY PRN
Qty: 60 TABLET | Refills: 0 | Status: SHIPPED | OUTPATIENT
Start: 2019-10-14 | End: 2019-10-31

## 2019-10-15 ENCOUNTER — TELEPHONE (OUTPATIENT)
Dept: PSYCHIATRY | Age: 54
End: 2019-10-15

## 2019-10-31 ENCOUNTER — OFFICE VISIT (OUTPATIENT)
Dept: PSYCHIATRY | Age: 54
End: 2019-10-31
Payer: MEDICAID

## 2019-10-31 VITALS
DIASTOLIC BLOOD PRESSURE: 65 MMHG | HEART RATE: 80 BPM | RESPIRATION RATE: 18 BRPM | BODY MASS INDEX: 25.92 KG/M2 | OXYGEN SATURATION: 100 % | SYSTOLIC BLOOD PRESSURE: 103 MMHG | WEIGHT: 141.7 LBS

## 2019-10-31 DIAGNOSIS — F41.0 GENERALIZED ANXIETY DISORDER WITH PANIC ATTACKS: ICD-10-CM

## 2019-10-31 DIAGNOSIS — F99 INSOMNIA DUE TO OTHER MENTAL DISORDER: ICD-10-CM

## 2019-10-31 DIAGNOSIS — F41.1 GENERALIZED ANXIETY DISORDER WITH PANIC ATTACKS: ICD-10-CM

## 2019-10-31 DIAGNOSIS — F51.05 INSOMNIA DUE TO OTHER MENTAL DISORDER: ICD-10-CM

## 2019-10-31 DIAGNOSIS — F33.0 MILD EPISODE OF RECURRENT MAJOR DEPRESSIVE DISORDER (HCC): Primary | ICD-10-CM

## 2019-10-31 PROCEDURE — 99215 OFFICE O/P EST HI 40 MIN: CPT | Performed by: NURSE PRACTITIONER

## 2019-10-31 RX ORDER — DIAZEPAM 5 MG/1
5 TABLET ORAL DAILY PRN
Qty: 45 TABLET | Refills: 0
Start: 2019-10-31 | End: 2020-01-02 | Stop reason: SDUPTHER

## 2019-12-04 ENCOUNTER — TELEPHONE (OUTPATIENT)
Dept: OTOLARYNGOLOGY | Age: 54
End: 2019-12-04

## 2020-01-02 RX ORDER — DIAZEPAM 5 MG/1
5 TABLET ORAL DAILY PRN
Qty: 40 TABLET | Refills: 0 | Status: SHIPPED | OUTPATIENT
Start: 2020-01-02 | End: 2020-02-14 | Stop reason: SDUPTHER

## 2020-01-02 RX ORDER — TRAZODONE HYDROCHLORIDE 100 MG/1
TABLET ORAL
Qty: 90 TABLET | Refills: 0 | Status: SHIPPED | OUTPATIENT
Start: 2020-01-02 | End: 2020-03-31 | Stop reason: SDUPTHER

## 2020-01-02 RX ORDER — DULOXETIN HYDROCHLORIDE 60 MG/1
CAPSULE, DELAYED RELEASE ORAL
Qty: 90 CAPSULE | Refills: 0 | Status: SHIPPED | OUTPATIENT
Start: 2020-01-02 | End: 2020-04-03

## 2020-01-02 NOTE — TELEPHONE ENCOUNTER
suicidality)     Endocrine: (heat or cold intolerance, excessive thirst (polydipsia), excessive hunger (polyphagia))     Immune/Allergic: (hives, seasonal or environmental allergies, HIV exposure)     Hematologic/Lymphatic: (lymph node enlargement, easy bleeding or bruising)     History obtained via chart review and patient     PCP is Louis Quinonez DO         Current Meds:     Home Medications           Prior to Admission medications    Medication Sig Start Date End Date Taking? Authorizing Provider   diazepam (VALIUM) 5 MG tablet Take 1 tablet by mouth daily as needed for Anxiety or Sleep for up to 30 days. With 15 extra tabs for breakthrough anxiety 10/31/19 11/30/19 Yes ALESSANDRA Mayo - NP   DULoxetine (CYMBALTA) 60 MG extended release capsule TAKE 1 CAPSULE BY MOUTH EVERY DAY 10/11/19     ALESSANDRA Mayo - FAISAL   traZODone (DESYREL) 100 MG tablet TAKE One TABLETS BY MOUTH AT BEDTIME AS NEEDED FOR INSOMNIA 10/11/19     ALESSANDRA Mayo NP   nystatin (MYCOSTATIN) 016084 UNIT/ML suspension Take 100,000 Units by mouth 4 times daily       Historical Provider, MD   gabapentin (NEURONTIN) 300 MG capsule TAKE 3 CAPSULES BY MOUTH EVERY 8 HOURS. Patient taking differently: Take 600 mg by mouth 3 times daily. 2/16/18 4/26/19   ALESSANDRA Weston   carisoprodol (SOMA) 250 MG tablet Take 350 mg by mouth three times daily       Historical Provider, MD   oxyCODONE-acetaminophen (PERCOCET)  MG per tablet every 3-4 hours as needed. . 1/27/16     Historical Provider, MD   propranolol (INDERAL LA) 80 MG CR capsule daily  2/19/16     Historical Provider, MD   CELEBREX 200 MG capsule 200 mg daily.  9/29/14     Historical Provider, MD         Social History   Social History            Socioeconomic History    Marital status:        Spouse name: None    Number of children: None    Years of education: None    Highest education level: None   Occupational History    None   Social Needs    Financial it at the same time, just take when you need it for anxiety)     Follow up: Return in about 3 months (around 1/31/2020).     1. The risks, benefits, side effects, indications, contraindications, and adverse effects of the medications have been discussed. Yes.  2. The pt has verbalized understanding and has capacity to give informed consent. 3. The Geri Castellon report has been reviewed according to [facility] regulations. 4. Supportive therapy offered. 5. Follow up:    Return in about 3 months (around 1/31/2020). 6. The patient has been advised to call with any problems. 7. Controlled substance Treatment Plan: this is a maintenance dose. .  8. The above listed medications have been continued, modifications in meds and other orders/labs as follows:                 Encounter Medications         Orders Placed This Encounter   Medications    diazepam (VALIUM) 5 MG tablet       Sig: Take 1 tablet by mouth daily as needed for Anxiety or Sleep for up to 30 days. With 15 extra tabs for breakthrough anxiety       Dispense:  45 tablet       Refill:  0                     No orders of the defined types were placed in this encounter.        9. Additional comments: She is fairly stable on this combination of medications. She says that she has used her Valium more over the past month due to stresses from her adult daughter (35 yrs old). Will make no medication changes at this visit. Encouraged her to try using 1/2 of a 25mg tab of hydroxyzine to see if it helps for anxiety. She took a full 25mg tab this morning and it made her drowsy. She says that she has gotten through her rough patch with her daughter and has decreased her use of Valium. We discussed the desirability of getting off of Valium altogether. She verbalized understanding. Will make no medication changes at this visit and will follow up in about 3 months.     10. Over 50% of the total visit time of   30  minutes was spent on counseling and/or coordination of care of:    1. Mild episode of recurrent major depressive disorder ([facility] [address]    2. Generalized anxiety disorder with panic attacks    3.  Insomnia due to other mental disorder                        Psychotherapy Topics: mood/medication effectiveness         [facility]

## 2020-02-14 ENCOUNTER — OFFICE VISIT (OUTPATIENT)
Dept: PSYCHIATRY | Age: 55
End: 2020-02-14
Payer: MEDICAID

## 2020-02-14 VITALS
BODY MASS INDEX: 26.5 KG/M2 | SYSTOLIC BLOOD PRESSURE: 134 MMHG | HEIGHT: 62 IN | WEIGHT: 144 LBS | HEART RATE: 77 BPM | OXYGEN SATURATION: 100 % | DIASTOLIC BLOOD PRESSURE: 92 MMHG

## 2020-02-14 DIAGNOSIS — Z79.899 ENCOUNTER FOR LONG-TERM (CURRENT) USE OF OTHER MEDICATIONS: ICD-10-CM

## 2020-02-14 LAB
AMPHETAMINE SCREEN, URINE: NEGATIVE
BARBITURATE SCREEN URINE: NEGATIVE
BENZODIAZEPINE SCREEN, URINE: POSITIVE
CANNABINOID SCREEN URINE: NEGATIVE
COCAINE METABOLITE SCREEN URINE: NEGATIVE
Lab: ABNORMAL
OPIATE SCREEN URINE: NEGATIVE

## 2020-02-14 PROCEDURE — 99214 OFFICE O/P EST MOD 30 MIN: CPT | Performed by: NURSE PRACTITIONER

## 2020-02-14 RX ORDER — HYDROXYZINE HYDROCHLORIDE 25 MG/1
25 TABLET, FILM COATED ORAL 3 TIMES DAILY PRN
COMMUNITY
End: 2020-05-14 | Stop reason: SDUPTHER

## 2020-02-14 RX ORDER — DIAZEPAM 5 MG/1
5 TABLET ORAL DAILY PRN
Qty: 30 TABLET | Refills: 0 | Status: SHIPPED | OUTPATIENT
Start: 2020-02-14 | End: 2020-04-22 | Stop reason: SDUPTHER

## 2020-02-14 NOTE — PATIENT INSTRUCTIONS
treatment may increase your risk of a severe life-threatening skin rash. You may need frequent blood tests to help your doctor make sure you are taking the right dose. Extended-release and immediate-release lamotrigine may be used for different conditions. Always check your refills to make sure you have received the correct size, color, and shape of tablet. Avoid medication errors by using only the form and strength your doctor prescribes. If you switch to lamotrigine from another seizure medicine, carefully follow your doctor's instructions about the timing and dosage of your medicine. Swallow the tablet whole and do not crush, chew, or break it. Read and carefully follow any Instructions for Use provided with the orally disintegrating or dispersible tablets. Ask your doctor or pharmacist if you do not understand these instructions. Do not stop using lamotrigine suddenly, even if you feel fine. Stopping suddenly may cause increased seizures. Follow your doctor's instructions about tapering your dose. In case of emergency, wear or carry medical identification to let others know you use seizure medication. Lamotrigine may affect a drug-screening urine test and you may have false results. Tell the laboratory staff that you use lamotrigine. Store at room temperature away from light and moisture. What happens if I miss a dose? Take the medicine as soon as you can, but skip the missed dose if it is almost time for your next dose. Do not take two doses at one time. Get your prescription refilled before you run out of medicine completely. What happens if I overdose? Seek emergency medical attention or call the Poison Help line at 1-115.664.3049. Overdose symptoms may include blurred vision, problems with coordination, increased seizures, feeling light-headed, or fainting. What should I avoid while taking lamotrigine? Avoid driving or hazardous activity until you know how this medicine will affect you. 8/9/2018. Care instructions adapted under license by [facility] [place] ([facility]). If you have questions about a medical condition or this instruction, always ask your healthcare professional. [address] any warranty or liability for your use of this information.
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
## 2020-02-19 ENCOUNTER — LAB REQUISITION (OUTPATIENT)
Dept: LAB | Facility: HOSPITAL | Age: 55
End: 2020-02-19

## 2020-02-19 DIAGNOSIS — Z00.00 ENCOUNTER FOR GENERAL ADULT MEDICAL EXAMINATION WITHOUT ABNORMAL FINDINGS: ICD-10-CM

## 2020-02-19 LAB — MACROSCOPIC EXAM: NORMAL

## 2020-02-19 PROCEDURE — 87169 MACROSCOPIC EXAM PARASITE: CPT | Performed by: INTERNAL MEDICINE

## 2020-03-31 ENCOUNTER — TELEPHONE (OUTPATIENT)
Dept: PSYCHIATRY | Age: 55
End: 2020-03-31

## 2020-03-31 RX ORDER — TRAZODONE HYDROCHLORIDE 100 MG/1
TABLET ORAL
Qty: 90 TABLET | Refills: 0 | Status: SHIPPED | OUTPATIENT
Start: 2020-03-31 | End: 2020-05-14

## 2020-03-31 NOTE — TELEPHONE ENCOUNTER
Attempted to contact pt to inform her that RX for Trazodone had been sent to her pharmacy-no answer and no ability to leave VM.
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
## 2020-04-03 ENCOUNTER — TELEPHONE (OUTPATIENT)
Dept: PSYCHIATRY | Age: 55
End: 2020-04-03

## 2020-04-03 RX ORDER — DULOXETIN HYDROCHLORIDE 60 MG/1
CAPSULE, DELAYED RELEASE ORAL
Qty: 90 CAPSULE | Refills: 0 | Status: SHIPPED | OUTPATIENT
Start: 2020-04-03 | End: 2020-06-26

## 2020-04-22 RX ORDER — DIAZEPAM 2 MG/1
2 TABLET ORAL DAILY PRN
Qty: 30 TABLET | Refills: 0 | Status: SHIPPED | OUTPATIENT
Start: 2020-04-22 | End: 2020-05-29 | Stop reason: SDUPTHER

## 2020-04-22 NOTE — TELEPHONE ENCOUNTER
Lexus Catalan called to request a refill on her medication    Armin Combs scanned into media     Last office visit : 2/14/2020   Next office visit : 5/14/2020     Requested Prescriptions     Pending Prescriptions Disp Refills    diazePAM (VALIUM) 5 MG tablet 30 tablet 0     Sig: Take 1 tablet by mouth daily as needed for Anxiety or Sleep for up to 30 days. Valentino Spears  Office Visit     2/14/2020  [address] [facility]   ALESSANDRA Dougherty - FAISAL   Nurse Practitioner Psychiatric/Mental Health   Mild episode of recurrent major depressive disorder [facility]) +3 more   Dx   Other     Reason for Visit    Progress Notes              Scan on 2/14/2020 11:45 AM by Jonita Moritz, LCSW: Rx picked up by pt on 2/14/20Scan on 2/14/2020 11:45 AM by Jonita Moritz, LCSW: Rx picked up by pt on 2/14/20    Progress Notes              Scan on 2/14/2020 11:45 AM by Jonita Moritz, LCSW: PHQ-9 and INNA-7Scan on 2/14/2020 11:45 AM by Jonita Moritz, LCSW: PHQ-9 and INNA-7    Progress Notes     Expand All Collapse All  []Expand All by Default  2/14/2020 1:37 PM                             Progress Note     IN:  1055  OUT: 36        Lexus Catalan 1965                         Chief Complaint   Patient presents with    Other       nightmares and flashbacks            Subjective:  Patient is a 47 y.o. female diagnosed with MDD, INNA, Insomnia and presents today for follow-up. Last seen in clinic on 10/31/19 and prior records were reviewed.     Today patient states, \"I have some of the most bizarre things going on with me, with my body and with my eyes. My doctor thinks it is something systemic and he is going to have me see a specialist.\" She says that her vision is blurry and her R eye and L eye have both had swelling. They are not swollen at this visit. She also reports that her feet and hands have swollen but they aren't swollen today. She doesn't know what is causing the problem.  She says the swelling has blood in urine incomplete bladder emptying, urinary incontinence, STD; MEN: testicular pain or swelling, erectile dysfunction; WOMEN: LMP, heavy menstrual bleeding (menorrhagia), irregular periods, postmenopausal bleeding, menstrual pain (dymenorrhea, vaginal discharge)     Musculoskeletal: (bone pain/fracture, joint pain or swelling, musle pain)     Integumentary: (rashes, acne, non-healing sores, itching, breast lumps, breast pain, nipple discharge, hair loss)     Neurologic: (HA, muscle weakness, paresthesias (numbness, coldness, crawling or prickling), memory loss, seizure, dizziness)     Psychiatric:  (anxiety, sadness, irritability/anger, insomnia, suicidality)     Endocrine: (heat or cold intolerance, excessive thirst (polydipsia), excessive hunger (polyphagia))     Immune/Allergic: (hives, seasonal or environmental allergies, HIV exposure)     Hematologic/Lymphatic: (lymph node enlargement, easy bleeding or bruising)     History obtained via chart review and patient     PCP is SIDNEY BRANCH,          Current Meds:     Home Medications           Prior to Admission medications    Medication Sig Start Date End Date Taking? Authorizing Provider   hydrOXYzine (ATARAX) 25 MG tablet Take 25 mg by mouth 3 times daily as needed     Yes Historical Provider, MD   diazepam (VALIUM) 5 MG tablet Take 1 tablet by mouth daily as needed for Anxiety or Sleep for up to 30 days. 2/14/20 3/15/20 Yes ALESSANDRA Holden NP   DULoxetine (CYMBALTA) 60 MG extended release capsule TAKE 1 CAPSULE BY MOUTH EVERY DAY 1/2/20     ALESSANDRA Holden NP   traZODone (DESYREL) 100 MG tablet TAKE One TABLETS BY MOUTH AT BEDTIME AS NEEDED FOR INSOMNIA 1/2/20     ALESSANDRA Holden NP   nystatin (MYCOSTATIN) 790379 UNIT/ML suspension Take 100,000 Units by mouth 4 times daily       Historical Provider, MD   gabapentin (NEURONTIN) 300 MG capsule TAKE 3 CAPSULES BY MOUTH EVERY 8 HOURS.   Patient taking differently: Take 600 mg by mouth 3 connections  Attention Span and concentration: Normal   Judgement Insight:  normal and appropriate  Gait and Station:normal gait and station   Sleep: avg 8-9 hrs   Appetite: ok              Lab Results   Component Value Date      04/26/2019     K 3.5 04/26/2019      04/26/2019     CO2 30 (H) 04/26/2019     BUN 7 04/26/2019     CREATININE 0.6 04/26/2019     GLUCOSE 68 (L) 04/26/2019     CALCIUM 9.5 04/26/2019     PROT 7.2 04/26/2019     LABALBU 4.1 04/26/2019     BILITOT <0.2 04/26/2019     ALKPHOS 70 04/26/2019     AST 22 04/26/2019     ALT 16 04/26/2019     LABGLOM >60 04/26/2019            Lab Results   Component Value Date      04/26/2019     K 3.5 04/26/2019      04/26/2019     CO2 30 04/26/2019     BUN 7 04/26/2019     CREATININE 0.6 04/26/2019     GLUCOSE 68 04/26/2019     CALCIUM 9.5 04/26/2019      No results found for: CHOL  No results found for: TRIG  No results found for: HDL  No results found for: LDLCHOLESTEROL, LDLCALC  No results found for: LABVLDL, VLDL  No results found for: CHOLHDLRATIO  No results found for: LABA1C  No results found for: EAG        Lab Results   Component Value Date     TSH 2.710 04/26/2019      No results found for: VITD25     Assessments Administered:     PHQ9: 6, mild  GAD7: 10, moderate     Assessment:    1. Mild episode of recurrent major depressive disorder ([facility] [address]    2. INNA (generalized anxiety disorder)    3. Insomnia due to other mental disorder    4. Acute stress reaction       R/O Bipolar Disorder     Plan:  Continue:  Cymbalta (Duloxetine), 60mg, daily  Trazodone, 100mg, nightly as needed for sleep  Diazepam, 5mg, daily as needed for anxiety (try halving the pill when you can, don't take it at the same time, just take when you need it for anxiety)  Hydroxyzine, 25mg, take 1/2 -1 tab up to 3 times daily for anxiety as needed (prescribed by Dr. Hanane Carmichael)     Follow up: Return in about 3 months (around 5/14/2020).     1.  The risks, benefits, side

## 2020-05-14 ENCOUNTER — VIRTUAL VISIT (OUTPATIENT)
Dept: PSYCHIATRY | Age: 55
End: 2020-05-14
Payer: MEDICAID

## 2020-05-14 ENCOUNTER — TELEPHONE (OUTPATIENT)
Dept: PSYCHIATRY | Age: 55
End: 2020-05-14

## 2020-05-14 PROCEDURE — 99443 PR PHYS/QHP TELEPHONE EVALUATION 21-30 MIN: CPT | Performed by: NURSE PRACTITIONER

## 2020-05-14 RX ORDER — TRAZODONE HYDROCHLORIDE 100 MG/1
150 TABLET ORAL NIGHTLY PRN
Qty: 135 TABLET | Refills: 0 | Status: SHIPPED | OUTPATIENT
Start: 2020-05-14 | End: 2020-08-13 | Stop reason: SDUPTHER

## 2020-05-14 RX ORDER — DULOXETIN HYDROCHLORIDE 20 MG/1
20 CAPSULE, DELAYED RELEASE ORAL DAILY
Qty: 30 CAPSULE | Refills: 3 | Status: SHIPPED | OUTPATIENT
Start: 2020-05-14 | End: 2020-06-08 | Stop reason: SDUPTHER

## 2020-05-14 RX ORDER — HYDROXYZINE HYDROCHLORIDE 25 MG/1
25 TABLET, FILM COATED ORAL 3 TIMES DAILY PRN
Qty: 90 TABLET | Refills: 3 | Status: SHIPPED | OUTPATIENT
Start: 2020-05-14 | End: 2020-08-07

## 2020-05-14 NOTE — TELEPHONE ENCOUNTER
Placed call to pt to discuss follow up appointment. Pt follow up date/time given, pt voiced understanding and agreement. Pt confirmed address, etc., however, pt asked for her AVS not to be mailed to her.

## 2020-05-14 NOTE — PROGRESS NOTES
see little white specks and get worried over this. She says that she will have spells of constant nightmares. She says that she is having panic attacks 2-3 times a week over the little white things that she sees. Patient reports side effects as follows: none. No evidence of EPS, no cogwheeling or abnormal motor movements. Absent  suicidal ideation. Reports compliance with medications as good . Current Substance Use:  See history    BP: There were no vitals taken for this visit.       Review of Systems - 14 point review:  Negative except being treated for: insomnia, anxiety, depression       Constitutional: (fevers, chills, night sweats, wt loss/gain, change in appetite, fatigue, somnolence)    HEENT: (ear pain or discharge, hearing loss, ear ringing, sinus pressure, nosebleed, nasal discharge, sore throat, oral sores, tooth pain, bleeding gums, hoarse voice, neck pain)      Cardiovascular: (HTN, chest pain, elevated cholesterol/lipids, palpitations, leg swelling, leg pain with walking)    Respiratory: (cough, wheezing, snoring, SOB with activity (dyspnea), SOB while lying flat (orthopnea), awakening with severe SOB (paroxysmal nocturnal dyspnea))    Gastrointestinal: (NVD, constipation, abdominal pain, bright red stools, black tarry stools, stool incontinence)     Genitourinary:  (pelvic pain, burning or frequency of urination, urinary urgency, blood in urine incomplete bladder emptying, urinary incontinence, STD; MEN: testicular pain or swelling, erectile dysfunction; WOMEN: LMP, heavy menstrual bleeding (menorrhagia), irregular periods, postmenopausal bleeding, menstrual pain (dymenorrhea, vaginal discharge)    Musculoskeletal: (bone pain/fracture, joint pain or swelling, musle pain)    Integumentary: (rashes, acne, non-healing sores, itching, breast lumps, breast pain, nipple discharge, hair loss)    Neurologic: (HA, muscle weakness, paresthesias (numbness, coldness, crawling or prickling), memory loss, 04/26/2019     04/26/2019    CO2 30 (H) 04/26/2019    BUN 7 04/26/2019    CREATININE 0.6 04/26/2019    GLUCOSE 68 (L) 04/26/2019    CALCIUM 9.5 04/26/2019    PROT 7.2 04/26/2019    LABALBU 4.1 04/26/2019    BILITOT <0.2 04/26/2019    ALKPHOS 70 04/26/2019    AST 22 04/26/2019    ALT 16 04/26/2019    LABGLOM >60 04/26/2019     Lab Results   Component Value Date     04/26/2019    K 3.5 04/26/2019     04/26/2019    CO2 30 04/26/2019    BUN 7 04/26/2019    CREATININE 0.6 04/26/2019    GLUCOSE 68 04/26/2019    CALCIUM 9.5 04/26/2019      No results found for: CHOL  No results found for: TRIG  No results found for: HDL  No results found for: LDLCHOLESTEROL, LDLCALC  No results found for: LABVLDL, VLDL  No results found for: CHOLHDLRATIO  No results found for: LABA1C  No results found for: EAG  Lab Results   Component Value Date    TSH 2.710 04/26/2019     No results found for: VITD25    Assessments Administered:    PHQ9: 13, moderate  GAD7: 15, moderate    Assessment:   1. Moderate episode of recurrent major depressive disorder [facility]    2. Panic disorder    3. Insomnia, unspecified type    4. PTSD (post-traumatic stress disorder)         R/O Bipolar Disorder    Plan:    Decrease:  Diazepam, 2mg, daily as needed for anxiety (start taking 1/2 tab daily and then just adding the other half when you need it for anxiety)    Continue  Hydroxyzine, 25mg, take 1/2 -1 tab up to 3 times daily for anxiety as needed     Increase:  Trazodone, 100mg, take 1.5 tabs nightly as needed for sleep    Increase:  Duloxetine, 60mg, in the morning  Duloxetine, 20mg, in the evening (no later the evening meal)     (she is currently taking 60mg at bedtime and she will see what works best for her)    Follow up: Return in about 3 months (around 8/14/2020). 1. The risks, benefits, side effects, indications, contraindications, and adverse effects of the medications have been discussed.  Yes.  2. The pt has verbalized understanding her Trazodone in the last 3 days to 1.5 tabs to help with nightmares and she says this has helped. She didn't want to start a new medication (Prazosin). Will not make any further medication changes. Educated her about the dangers of vaping. She verbalized understanding. She says that she doesn't inhale. Will include education about vaping on her AVS. Will follow up in about 3 months. 10.Over 50% of the total visit time of   35  minutes was spent on counseling and/or coordination of care of:                        1. Moderate episode of recurrent major depressive disorder ([facility] [address]    2. Panic disorder    3. Insomnia, unspecified type    4.  PTSD (post-traumatic stress disorder)                      Psychotherapy Topics: mood/medication effectiveness       Fabio Harden PMHNP-BC

## 2020-05-28 ENCOUNTER — TELEPHONE (OUTPATIENT)
Dept: PSYCHIATRY | Age: 55
End: 2020-05-28

## 2020-05-28 NOTE — TELEPHONE ENCOUNTER
Pt called stating that she was having issues with anxiety. \"Shaking out of my shell\". Pt reports having panic attacks about 3 to 4 times per week that she describes as shaking, chest tightness and crying. Pt says she has had issues with anxiety since about age 15. She says she is having panic attacks twice as much since the Valium was decreased from 5 mg to 2 mg. Pt says she was doing \"ok\" on the 5 mg daily and would like to go back on that dose. Pt made aware that [address] [place] [address] would be given the above info.

## 2020-05-29 ENCOUNTER — TRANSCRIBE ORDERS (OUTPATIENT)
Dept: ADMINISTRATIVE | Facility: HOSPITAL | Age: 55
End: 2020-05-29

## 2020-05-29 ENCOUNTER — TELEPHONE (OUTPATIENT)
Dept: PSYCHIATRY | Age: 55
End: 2020-05-29

## 2020-05-29 DIAGNOSIS — I87.2 PERIPHERAL VENOUS INSUFFICIENCY: Primary | ICD-10-CM

## 2020-05-29 RX ORDER — DIAZEPAM 5 MG/1
5 TABLET ORAL DAILY PRN
Qty: 30 TABLET | Refills: 0 | Status: SHIPPED | OUTPATIENT
Start: 2020-05-29 | End: 2020-07-13

## 2020-05-29 NOTE — TELEPHONE ENCOUNTER
5/29/20 [id number]    Patient reports to YUSEF Zuñiga RN, that reducing the dose of Valium from 5mg to 2mg has not worked. She reports high anxiety, having panic attacks 3-4 times per week, shaking, chest tightness, crying. Will increase Valium back to 5mg.     ALESSANDRA Gilmore-NP
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
## 2020-06-08 RX ORDER — DULOXETIN HYDROCHLORIDE 20 MG/1
20 CAPSULE, DELAYED RELEASE ORAL DAILY
Qty: 90 CAPSULE | Refills: 0 | Status: SHIPPED | OUTPATIENT
Start: 2020-06-08 | End: 2020-08-13

## 2020-06-08 NOTE — TELEPHONE ENCOUNTER
Pharmacy requesting refill request for patient [address] is requesting a 80 day supply     Last office visit : 5/14/2020   Next office visit : 8/13/2020     Requested Prescriptions     Pending Prescriptions Disp Refills    DULoxetine (CYMBALTA) 20 MG extended release capsule 90 capsule 0     Sig: Take 1 capsule by mouth daily            Pillo Galeano  Virtual Visit     5/14/2020  [address] [facility]   Arjun Elliott, APRN - NP   Nurse Practitioner Psychiatric/Mental Health   Moderate episode of recurrent major depressive disorder ([facility] [address] +3 more   Dx   Follow-up , Anxiety , Depression , Panic Attack , Insomnia , Other     Reason for Visit    Progress Notes     Expand All Collapse All  []Expand All by Default  Emeli Jacobson is a 47 y.o. female evaluated via telephone on 5/14/2020.       Consent:  She and/or health care decision maker is aware that that she may receive a bill for this telephone service, depending on her insurance coverage, and has provided verbal consent to proceed: Yes        Documentation:  I communicated with the patient and/or health care decision maker about anxiety, depression, panic attacks, nightmares, vaping.    Details of this discussion including any medical advice provided: see below        I affirm this is a Patient Initiated Episode with a Patient who has not had a related appointment within my department in the past 7 days or scheduled within the next 24 hours.     Patient identification was verified at the start of the visit: Yes     Total Time: minutes: 21-30 minutes     Note: not billable if this call serves to triage the patient into an appointment for the relevant concern        Arjun Elliott      5/14/2020 12:21 PM                           Progress Note     IN:  [address]  OUT: 1210        Emeli Jacobson 1965                         Chief Complaint   Patient presents with    Follow-up    Anxiety    Depression    Panic Attack    Insomnia    Other       nightmares            Subjective:  Patient is a 47 y.o. female diagnosed with MDD, INNA, Insomnia and presents today for follow-up. Last seen in clinic on 2/14/20 and prior records were reviewed.     Today patient states, \"I'm having some problems that I haven't shared with you. \" She reports about a year and half ago she caught a skin problem from her dog. She says that this has gone on for the better part of a year. She says that they have finally found a liquid to apply to her skin. She says that she was clawing herself raw. She says that her doctors couldn't figure out what it was. It wasn't lice or scabies. She says that it has gotten better and she gets anxious now with any little itch worrying about whether this is going to return. She says that she has \"her moments\" now when she will see little white specks and get worried over this. She says that she will have spells of constant nightmares. She says that she is having panic attacks 2-3 times a week over the little white things that she sees.     Patient reports side effects as follows: none. No evidence of EPS, no cogwheeling or abnormal motor movements.     Absent  suicidal ideation.   Reports compliance with medications as good .      Current Substance Use:  See history     BP: There were no vitals taken for this visit.        Review of Systems - 14 point review:  Negative except being treated for: insomnia, anxiety, depression         Constitutional: (fevers, chills, night sweats, wt loss/gain, change in appetite, fatigue, somnolence)     HEENT: (ear pain or discharge, hearing loss, ear ringing, sinus pressure, nosebleed, nasal discharge, sore throat, oral sores, tooth pain, bleeding gums, hoarse voice, neck pain)      Cardiovascular: (HTN, chest pain, elevated cholesterol/lipids, palpitations, leg swelling, leg pain with walking)     Respiratory: (cough, wheezing, snoring, SOB with activity (dyspnea), SOB while lying flat (orthopnea), ALESSANDRA Duggan - NP   DULoxetine (CYMBALTA) 60 MG extended release capsule TAKE 1 CAPSULE BY MOUTH EVERY DAY 4/3/20     ALESSANDRA Duggan NP   nystatin (MYCOSTATIN) 920214 UNIT/ML suspension Take 100,000 Units by mouth 4 times daily       Historical Provider, MD   gabapentin (NEURONTIN) 300 MG capsule TAKE 3 CAPSULES BY MOUTH EVERY 8 HOURS. Patient taking differently: Take 600 mg by mouth 3 times daily. 2/16/18 4/26/19   ALESSANDRA Benedict   carisoprodol (SOMA) 250 MG tablet Take 350 mg by mouth three times daily       Historical Provider, MD   oxyCODONE-acetaminophen (PERCOCET)  MG per tablet every 3-4 hours as needed. . 1/27/16     Historical Provider, MD   propranolol (INDERAL LA) 80 MG CR capsule daily  2/19/16     Historical Provider, MD   CELEBREX 200 MG capsule 200 mg daily.  9/29/14     Historical Provider, MD         Social History   Social History            Socioeconomic History    Marital status:        Spouse name: None    Number of children: None    Years of education: None    Highest education level: None   Occupational History    None   Social Needs    Financial resource strain: None    Food insecurity       Worry: None       Inability: None    Transportation needs       Medical: None       Non-medical: None   Tobacco Use    Smoking status: Former Smoker       Packs/day: 1.50       Last attempt to quit: 11/4/2016       Years since quitting: 3.5    Smokeless tobacco: Never Used    Tobacco comment: pt is using ecigs   Substance and Sexual Activity    Alcohol use: No    Drug use: No    Sexual activity: None   Lifestyle    Physical activity       Days per week: None       Minutes per session: None    Stress: None   Relationships    Social connections       Talks on phone: None       Gets together: None       Attends [employer] service: None       Active member of club or organization: None       Attends meetings of clubs or organizations: None       Relationship [id number]     Assessments Administered:     PHQ9: 13, moderate  GAD7: 15, moderate     Assessment:    1. Moderate episode of recurrent major depressive disorder ([facility] [address]    2. Panic disorder    3. Insomnia, unspecified type    4. PTSD (post-traumatic stress disorder)          R/O Bipolar Disorder     Plan:     Decrease:  Diazepam, 2mg, daily as needed for anxiety (start taking 1/2 tab daily and then just adding the other half when you need it for anxiety)     Continue  Hydroxyzine, 25mg, take 1/2 -1 tab up to 3 times daily for anxiety as needed      Increase:  Trazodone, 100mg, take 1.5 tabs nightly as needed for sleep     Increase:  Duloxetine, 60mg, in the morning  Duloxetine, 20mg, in the evening (no later the evening meal)     (she is currently taking 60mg at bedtime and she will see what works best for her)     Follow up: Return in about 3 months (around 8/14/2020).     1. The risks, benefits, side effects, indications, contraindications, and adverse effects of the medications have been discussed. Yes.  2. The pt has verbalized understanding and has capacity to give informed consent. 3. The [facility] report has been reviewed according to [facility] regulations. 4. Supportive therapy offered. 5. Follow up:    Return in about 3 months (around 8/14/2020). 6. The patient has been advised to call with any problems. 7. Controlled substance Treatment Plan: this is a tapering dose. 8. The above listed medications have been continued, modifications in meds and other orders/labs as follows:                 Encounter Medications         Orders Placed This Encounter   Medications    DULoxetine (CYMBALTA) 20 MG extended release capsule       Sig: Take 1 capsule by mouth daily       Dispense:  30 capsule       Refill:  3    traZODone (DESYREL) 100 MG tablet       Sig: Take 1.5 tablets by mouth nightly as needed for Sleep       Dispense:  135 tablet       Refill:  0       Please discontinue all other Trazodone scripts.     children, and young adults up to age 22. Liquid nicotine can be lethal if swallowed. Keep it out of children's reach. It may cause a deadly lung disease. There have been outbreaks of lung disease and death related to vaping. Some sources call it vaping-related lung injury. Many of these may be from vaping products with THC. But the exact cause isn't known. How well does it work to help people stop smoking? More research is needed to know how well vaping works to help people stop smoking. Some people try to quit smoking by reducing the amount of nicotine they vape. They do this slowly over time. If you're thinking of using vaping to help you stop smoking, talk to your doctor first. Here are some other things to think about. It's not approved as a quit-smoking aid. The U.S. Food and Drug Administration and the Centers for Disease Control and Prevention don't recommend vaping to help quit smoking. Nicotine replacement patches or gums are preferred. Many people end up using both. This is called dual use. Some people choose to vape when they can't smoke. But they still smoke cigarettes. Where can you learn more? Go to https://[employer]. org and sign in to your [employer] account. Enter C351 in the [employer] box to learn more about \"Learning About Vaping. \"     If you do not have an account, please click on the \"Sign Up Now\" link. Current as of: July 4, 2019Content Version: 12.4  © 5128-3509 [employer]wise, Incorporated. Care instructions adapted under license by [facility] [place] ([facility]).  If you have questions about a medical condition or this instruction, always ask your healthcare professional. [place] [address] any warranty or liability for your use of this information.                 Patient declined After Visit Summary   Additional Documentation     Encounter Info:    Billing Info,    Allergies,    Detailed Report,    History,    Medications,    Questionnaires       Chart

## 2020-06-26 RX ORDER — DULOXETIN HYDROCHLORIDE 60 MG/1
CAPSULE, DELAYED RELEASE ORAL
Qty: 90 CAPSULE | Refills: 0 | Status: SHIPPED | OUTPATIENT
Start: 2020-06-26 | End: 2020-08-13 | Stop reason: SDUPTHER
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
## 2020-07-10 NOTE — TELEPHONE ENCOUNTER
Antoni Beltran called to request refill on her medication      iraida scanned into media    Last office visit: 5/14/2020  Next office visit: 8/13/2020    Requested Prescriptions     Pending Prescriptions Disp Refills    diazePAM (VALIUM) 5 MG tablet [Pharmacy Med Name: DIAZEPAM 5 MG TABLET] 30 tablet 0     Sig: Take 1 tablet by mouth daily as needed for Anxiety or Sleep for up to 30 days. Virtual Visit     5/14/2020  [address] [facility]   ALESSANDRA Davis - NP   Nurse Practitioner Psychiatric/Mental Health   Moderate episode of recurrent major depressive disorder ([facility] [address] +3 more   Dx   Follow-up , Anxiety , Depression , Panic Attack , Insomnia , Other     Reason for Visit    Progress Notes     Expand All Collapse All  []Expand All by Default  Antoni Beltran is a 47 y.o. female evaluated via telephone on 5/14/2020.       Consent:  She and/or health care decision maker is aware that that she may receive a bill for this telephone service, depending on her insurance coverage, and has provided verbal consent to proceed: Yes        Documentation:  I communicated with the patient and/or health care decision maker about anxiety, depression, panic attacks, nightmares, vaping.    Details of this discussion including any medical advice provided: see below        I affirm this is a Patient Initiated Episode with a Patient who has not had a related appointment within my department in the past 7 days or scheduled within the next 24 hours.     Patient identification was verified at the start of the visit: Yes     Total Time: minutes: 21-30 minutes     Note: not billable if this call serves to triage the patient into an appointment for the relevant concern        Jennifer Abernathy      5/14/2020 12:21 PM                           Progress Note     IN:  [address]  OUT: [address]        Antoni Beltran 1965                         Chief Complaint   Patient presents with    Follow-up    Anxiety    Depression    Panic Attack    Insomnia    Other       nightmares            Subjective:  Patient is a 47 y.o. female diagnosed with MDD, INNA, Insomnia and presents today for follow-up. Last seen in clinic on 2/14/20 and prior records were reviewed.     Today patient states, \"I'm having some problems that I haven't shared with you. \" She reports about a year and half ago she caught a skin problem from her dog. She says that this has gone on for the better part of a year. She says that they have finally found a liquid to apply to her skin. She says that she was clawing herself raw. She says that her doctors couldn't figure out what it was. It wasn't lice or scabies. She says that it has gotten better and she gets anxious now with any little itch worrying about whether this is going to return. She says that she has \"her moments\" now when she will see little white specks and get worried over this. She says that she will have spells of constant nightmares. She says that she is having panic attacks 2-3 times a week over the little white things that she sees.     Patient reports side effects as follows: none. No evidence of EPS, no cogwheeling or abnormal motor movements.     Absent  suicidal ideation.   Reports compliance with medications as good .      Current Substance Use:  See history     BP: There were no vitals taken for this visit.        Review of Systems - 14 point review:  Negative except being treated for: insomnia, anxiety, depression         Constitutional: (fevers, chills, night sweats, wt loss/gain, change in appetite, fatigue, somnolence)     HEENT: (ear pain or discharge, hearing loss, ear ringing, sinus pressure, nosebleed, nasal discharge, sore throat, oral sores, tooth pain, bleeding gums, hoarse voice, neck pain)      Cardiovascular: (HTN, chest pain, elevated cholesterol/lipids, palpitations, leg swelling, leg pain with walking)     Respiratory: (cough, wheezing, snoring, SOB with activity (dyspnea), SOB while lying flat (orthopnea), awakening with severe SOB (paroxysmal nocturnal dyspnea))     Gastrointestinal: (NVD, constipation, abdominal pain, bright red stools, black tarry stools, stool incontinence)     Genitourinary:  (pelvic pain, burning or frequency of urination, urinary urgency, blood in urine incomplete bladder emptying, urinary incontinence, STD; MEN: testicular pain or swelling, erectile dysfunction; WOMEN: LMP, heavy menstrual bleeding (menorrhagia), irregular periods, postmenopausal bleeding, menstrual pain (dymenorrhea, vaginal discharge)     Musculoskeletal: (bone pain/fracture, joint pain or swelling, musle pain)     Integumentary: (rashes, acne, non-healing sores, itching, breast lumps, breast pain, nipple discharge, hair loss)     Neurologic: (HA, muscle weakness, paresthesias (numbness, coldness, crawling or prickling), memory loss, seizure, dizziness)     Psychiatric:  (anxiety, sadness, irritability/anger, insomnia, suicidality)     Endocrine: (heat or cold intolerance, excessive thirst (polydipsia), excessive hunger (polyphagia))     Immune/Allergic: (hives, seasonal or environmental allergies, HIV exposure)     Hematologic/Lymphatic: (lymph node enlargement, easy bleeding or bruising)     History obtained via chart review and patient     PCP is SIDNEY BRANCH, DO         Current Meds:     Home Medications           Prior to Admission medications    Medication Sig Start Date End Date Taking? Authorizing Provider   DULoxetine (CYMBALTA) 20 MG extended release capsule Take 1 capsule by mouth daily 5/14/20   Yes ALESSANDRA Hamilton NP   traZODone (DESYREL) 100 MG tablet Take 1.5 tablets by mouth nightly as needed for Sleep 5/14/20   Yes ALESSANDRA Hamilton NP   hydrOXYzine (ATARAX) 25 MG tablet Take 1 tablet by mouth 3 times daily as needed for Anxiety 5/14/20   Yes ALESSANDRA Hamilton NP   diazePAM (VALIUM) 2 MG tablet Take 1 tablet by mouth daily as needed for Anxiety or Sleep for up to 30 days. 4/22/20 5/22/20   ALESSANDRA Vasquez - NP   DULoxetine (CYMBALTA) 60 MG extended release capsule TAKE 1 CAPSULE BY MOUTH EVERY DAY 4/3/20     ALESSANDRA Vasquez NP   nystatin (MYCOSTATIN) 037240 UNIT/ML suspension Take 100,000 Units by mouth 4 times daily       Historical Provider, MD   gabapentin (NEURONTIN) 300 MG capsule TAKE 3 CAPSULES BY MOUTH EVERY 8 HOURS. Patient taking differently: Take 600 mg by mouth 3 times daily. 2/16/18 4/26/19   ALESSANDRA Domínguez   carisoprodol (SOMA) 250 MG tablet Take 350 mg by mouth three times daily       Historical Provider, MD   oxyCODONE-acetaminophen (PERCOCET)  MG per tablet every 3-4 hours as needed. . 1/27/16     Historical Provider, MD   propranolol (INDERAL LA) 80 MG CR capsule daily  2/19/16     Historical Provider, MD   CELEBREX 200 MG capsule 200 mg daily.  9/29/14     Historical Provider, MD         Social History   Social History            Socioeconomic History    Marital status:        Spouse name: None    Number of children: None    Years of education: None    Highest education level: None   Occupational History    None   Social Needs    Financial resource strain: None    Food insecurity       Worry: None       Inability: None    Transportation needs       Medical: None       Non-medical: None   Tobacco Use    Smoking status: Former Smoker       Packs/day: 1.50       Last attempt to quit: 11/4/2016       Years since quitting: 3.5    Smokeless tobacco: Never Used    Tobacco comment: pt is using ecigs   Substance and Sexual Activity    Alcohol use: No    Drug use: No    Sexual activity: None   Lifestyle    Physical activity       Days per week: None       Minutes per session: None    Stress: None   Relationships    Social connections       Talks on phone: None       Gets together: None       Attends Caodaism service: None       Active member of club or organization: None       Attends meetings of clubs or organizations: None this continues to bother her, she has flashbacks and nightmares about this. She says this happened about a month ago.           FAMILY PSYCHIATRIC HISTORY     Father, anxiety, depression, mood swings            MSE:  Patient is  A & O x 4. Appearance:  MARIA ESTHER  Cognition:  Recent memory intact , remote memory intact , good fund of knowledge, average  intelligence level. Speech:  normal  Language: Naming: intact;  Word Finding: intact  Conversation no evidence of delusions  Behavior:  Cooperative  Mood: \"decent\"  Affect: congruent with mood  Thought Content: negative delusions, negative hallucinations, negative obsessions,  negativehomicidal and negative suicidal   Thought Process: linear, goal directed and coherent  Associations: logical connections  Attention Span and concentration: Normal   Judgement Insight:  normal and appropriate  Gait and Station: MARIA ESTHER  Sleep: avg 7-8 hrs (unless she wakes up with a nightmare)   Appetite: ok              Lab Results   Component Value Date      04/26/2019     K 3.5 04/26/2019      04/26/2019     CO2 30 (H) 04/26/2019     BUN 7 04/26/2019     CREATININE 0.6 04/26/2019     GLUCOSE 68 (L) 04/26/2019     CALCIUM 9.5 04/26/2019     PROT 7.2 04/26/2019     LABALBU 4.1 04/26/2019     BILITOT <0.2 04/26/2019     ALKPHOS 70 04/26/2019     AST 22 04/26/2019     ALT 16 04/26/2019     LABGLOM >60 04/26/2019            Lab Results   Component Value Date      04/26/2019     K 3.5 04/26/2019      04/26/2019     CO2 30 04/26/2019     BUN 7 04/26/2019     CREATININE 0.6 04/26/2019     GLUCOSE 68 04/26/2019     CALCIUM 9.5 04/26/2019      No results found for: CHOL  No results found for: TRIG  No results found for: HDL  No results found for: LDLCHOLESTEROL, LDLCALC  No results found for: LABVLDL, VLDL  No results found for: CHOLHDLRATIO  No results found for: LABA1C  No results found for: EAG        Lab Results   Component Value Date     TSH 2.710 04/26/2019      No results found for: VITD25     Assessments Administered:     PHQ9: 13, moderate  GAD7: 15, moderate     Assessment:    1. Moderate episode of recurrent major depressive disorder ([facility] [address]    2. Panic disorder    3. Insomnia, unspecified type    4. PTSD (post-traumatic stress disorder)          R/O Bipolar Disorder     Plan:     Decrease:  Diazepam, 2mg, daily as needed for anxiety (start taking 1/2 tab daily and then just adding the other half when you need it for anxiety)     Continue  Hydroxyzine, 25mg, take 1/2 -1 tab up to 3 times daily for anxiety as needed      Increase:  Trazodone, 100mg, take 1.5 tabs nightly as needed for sleep     Increase:  Duloxetine, 60mg, in the morning  Duloxetine, 20mg, in the evening (no later the evening meal)     (she is currently taking 60mg at bedtime and she will see what works best for her)     Follow up: Return in about 3 months (around 8/14/2020).     1. The risks, benefits, side effects, indications, contraindications, and adverse effects of the medications have been discussed. Yes.  2. The pt has verbalized understanding and has capacity to give informed consent. 3. The [facility] report has been reviewed according to [facility] regulations. 4. Supportive therapy offered. 5. Follow up:    Return in about 3 months (around 8/14/2020). 6. The patient has been advised to call with any problems. 7. Controlled substance Treatment Plan: this is a tapering dose. 8. The above listed medications have been continued, modifications in meds and other orders/labs as follows:                 Encounter Medications         Orders Placed This Encounter   Medications    DULoxetine (CYMBALTA) 20 MG extended release capsule       Sig: Take 1 capsule by mouth daily       Dispense:  30 capsule       Refill:  3    traZODone (DESYREL) 100 MG tablet       Sig: Take 1.5 tablets by mouth nightly as needed for Sleep       Dispense:  135 tablet       Refill:  0       Please discontinue all other Trazodone scripts.     hydrOXYzine (ATARAX) 25 MG tablet       Sig: Take 1 tablet by mouth 3 times daily as needed for Anxiety       Dispense:  90 tablet       Refill:  3                       No orders of the defined types were placed in this encounter.        9. Additional comments: She reports that she increased Trazodone 100mg, to 1.5 tabs and that this has helped at night to make her sleep more soundly so that she doesn't wake up with nightmares. She says that with getting better sleep she is feeling a little better. She has been more recently traumatized by a skin condition that she thinks she got from a pet which . She has had doctors treating her for this. She had the condition for a year, it is now (as of 20) cleared up but she gets extremely anxious now when she sees white flecks on her skin thinking the condition may be returning and this is causing her 2-3 panic attacks during the week. Doctors never really identified what caused this. Her Valium has been decreased from 5mg daily to 2mg daily. Will increase Duloxetine to help with anxiety and depression. She likes Duloxetine because it also helps with back pain. She has increased her Trazodone in the last 3 days to 1.5 tabs to help with nightmares and she says this has helped. She didn't want to start a new medication (Prazosin). Will not make any further medication changes. Educated her about the dangers of vaping. She verbalized understanding. She says that she doesn't inhale. Will include education about vaping on her AVS. Will follow up in about 3 months.     10. Over 50% of the total visit time of   35  minutes was spent on counseling and/or coordination of care of:                         1. Moderate episode of recurrent major depressive disorder ([facility] [address]    2. Panic disorder    3. Insomnia, unspecified type    4.  PTSD (post-traumatic stress disorder)                        Psychotherapy Topics: mood/medication effectiveness         Helio Guadarrama PMBOGDAN-BC Instructions         Return in about 3 months (around 8/14/2020). Plan:     Decrease:  Diazepam, 2mg, daily as needed for anxiety (start taking 1/2 tab daily and then just adding the other half when you need it for anxiety)     Continue  Hydroxyzine, 25mg, take 1/2 -1 tab up to 3 times daily for anxiety as needed      Increase:  Trazodone, 100mg, take 1.5 tabs nightly as needed for sleep     Increase:  Duloxetine, 60mg, in the morning  Duloxetine, 20mg, in the evening (no later the evening meal)     (she is currently taking 60mg at bedtime and she will see what works best for her)     Follow up: Return in about 3 months (around 8/14/2020).    Patient Education        Learning About Vaping  What is it? Vaping is using a battery-powered device to inhale liquid nicotine or other substances. The devices can look like everyday items such as pens, cigarettes, or USB flash drives. Instead of tobacco, they use a blend of liquid nicotine, flavors, and other chemicals. This is called vaping liquid. It comes in different nicotine strengths. THC (a chemical in marijuana) products can also be used. Vapes are also called:  · E-cigarettes. · Vape pens. · Juuls. How do vapes work? Vapes make an aerosol cloud by heating vaping liquid or THC. You breathe in through the mouthpiece. This turns on the battery, which sotelo the heating element. This turns vaping liquid or THC into tiny particles suspended in gas, called an aerosol. What are the safety concerns? More research is needed before experts can tell if vaping is safe. The long-term health effects aren't known. Here's what experts are learning about vaping. It's not harmless water vapor. The \"vapor\" made by vaping isn't a vapor at all. It's an aerosol cloud made of chemicals suspended in a gas. This vape aerosol contains chemicals known to be harmful. It likely has nicotine. Nicotine is addictive.  It's harmful to developing brains, such as in unborn babies, children, and young adults up to age 22. Liquid nicotine can be lethal if swallowed. Keep it out of children's reach. It may cause a deadly lung disease. There have been outbreaks of lung disease and death related to vaping. Some sources call it vaping-related lung injury. Many of these may be from vaping products with THC. But the exact cause isn't known. How well does it work to help people stop smoking? More research is needed to know how well vaping works to help people stop smoking. Some people try to quit smoking by reducing the amount of nicotine they vape. They do this slowly over time. If you're thinking of using vaping to help you stop smoking, talk to your doctor first. Here are some other things to think about. It's not approved as a quit-smoking aid. The U.S. Food and Drug Administration and the Centers for Disease Control and Prevention don't recommend vaping to help quit smoking. Nicotine replacement patches or gums are preferred. Many people end up using both. This is called dual use. Some people choose to vape when they can't smoke. But they still smoke cigarettes. Where can you learn more? Go to https://REHAPP.Klip.in. org and sign in to your [employer] account. Enter C351 in the Knock Knock box to learn more about \"Learning About Vaping. \"     If you do not have an account, please click on the \"Sign Up Now\" link. Current as of: July 4, 2019Content Version: 12.4  © 8337-5814 Healthwise, Incorporated. Care instructions adapted under license by [facility].  If you have questions about a medical condition or this instruction, always ask your healthcare professional. [place] [address] any warranty or liability for your use of this information.                 Patient declined After Visit Summary   Additional Documentation     Encounter Info:    Billing Info,    Allergies,    Detailed Report,    History,    Medications,    Questionnaires       Chart Review Routing History     No routing history on file.    Encounter Status     Closed by Audrie Dakin, APRN - NP on 5/14/20 at 12:21   Orders Placed      None   Medication Changes         hydrOXYzine HCl 25 mg Oral 3 TIMES DAILY PRN       DULoxetine HCl          60 MG Cpep, TAKE 1 CAPSULE BY MOUTH EVERY DAY      Protocol Details       20 mg Oral DAILY       traZODone HCl 150 mg Oral NIGHTLY PRN       Medication List    Visit Diagnoses         Moderate episode of recurrent major depressive disorder ([facility] [address]      Panic disorder      Insomnia, unspecified type      PTSD (post-traumatic stress disorder)      Problem List

## 2020-07-13 RX ORDER — DIAZEPAM 5 MG/1
5 TABLET ORAL DAILY PRN
Qty: 30 TABLET | Refills: 0 | Status: SHIPPED | OUTPATIENT
Start: 2020-07-13 | End: 2020-09-17 | Stop reason: ALTCHOICE

## 2020-08-07 RX ORDER — HYDROXYZINE HYDROCHLORIDE 25 MG/1
TABLET, FILM COATED ORAL
Qty: 270 TABLET | Refills: 1 | Status: SHIPPED | OUTPATIENT
Start: 2020-08-07 | End: 2020-12-03 | Stop reason: ALTCHOICE

## 2020-08-07 NOTE — TELEPHONE ENCOUNTER
Pharmacy requesting refill request for patient Jeronimo Hayes      Last office visit : 5/14/2020   Next office visit : 8/13/2020     Requested Prescriptions     Pending Prescriptions Disp Refills    hydrOXYzine (ATARAX) 25 MG tablet [Pharmacy Med Name: HYDROXYZINE HCL 25 MG TABLET] 270 tablet 1     Sig: TAKE 1 TABLET BY MOUTH THREE TIMES A DAY AS NEEDED FOR [address]  Virtual Visit     5/14/2020  [address] [facility]   ALESSANDRA Michaels - NP   Nurse Practitioner Psychiatric/Mental Health   Moderate episode of recurrent major depressive disorder ([facility] [address] +3 more   Dx   Follow-up , Anxiety , Depression , Panic Attack , Insomnia , Other     Reason for Visit    Progress Notes     Expand All Collapse All  []Expand All by Default  Jeronimo Hayes is a 47 y.o. female evaluated via telephone on 5/14/2020.       Consent:  She and/or health care decision maker is aware that that she may receive a bill for this telephone service, depending on her insurance coverage, and has provided verbal consent to proceed: Yes        Documentation:  I communicated with the patient and/or health care decision maker about anxiety, depression, panic attacks, nightmares, vaping.    Details of this discussion including any medical advice provided: see below        I affirm this is a Patient Initiated Episode with a Patient who has not had a related appointment within my department in the past 7 days or scheduled within the next 24 hours.     Patient identification was verified at the start of the visit: Yes     Total Time: minutes: 21-30 minutes     Note: not billable if this call serves to triage the patient into an appointment for the relevant concern        González Huitron      5/14/2020 12:21 PM                           Progress Note     IN:  [address]  OUT: [address]        Jeronimo Hayes 1965                         Chief Complaint   Patient presents with    Follow-up    Anxiety    Depression    Panic Attack    Insomnia    Other       nightmares           Subjective:  Patient is a 47 y.o. female diagnosed with MDD, INNA, Insomnia and presents today for follow-up. Last seen in clinic on 2/14/20 and prior records were reviewed.     Today patient states, \"I'm having some problems that I haven't shared with you. \" She reports about a year and half ago she caught a skin problem from her dog. She says that this has gone on for the better part of a year. She says that they have finally found a liquid to apply to her skin. She says that she was clawing herself raw. She says that her doctors couldn't figure out what it was. It wasn't lice or scabies. She says that it has gotten better and she gets anxious now with any little itch worrying about whether this is going to return. She says that she has \"her moments\" now when she will see little white specks and get worried over this. She says that she will have spells of constant nightmares. She says that she is having panic attacks 2-3 times a week over the little white things that she sees.     Patient reports side effects as follows: none. No evidence of EPS, no cogwheeling or abnormal motor movements.     Absent  suicidal ideation.   Reports compliance with medications as good .      Current Substance Use:  See history     BP: There were no vitals taken for this visit.        Review of Systems - 14 point review:  Negative except being treated for: insomnia, anxiety, depression         Constitutional: (fevers, chills, night sweats, wt loss/gain, change in appetite, fatigue, somnolence)     HEENT: (ear pain or discharge, hearing loss, ear ringing, sinus pressure, nosebleed, nasal discharge, sore throat, oral sores, tooth pain, bleeding gums, hoarse voice, neck pain)      Cardiovascular: (HTN, chest pain, elevated cholesterol/lipids, palpitations, leg swelling, leg pain with walking)     Respiratory: (cough, wheezing, snoring, SOB with activity (dyspnea), SOB while lying flat (orthopnea), awakening with severe SOB (paroxysmal nocturnal dyspnea))     Gastrointestinal: (NVD, constipation, abdominal pain, bright red stools, black tarry stools, stool incontinence)     Genitourinary:  (pelvic pain, burning or frequency of urination, urinary urgency, blood in urine incomplete bladder emptying, urinary incontinence, STD; MEN: testicular pain or swelling, erectile dysfunction; WOMEN: LMP, heavy menstrual bleeding (menorrhagia), irregular periods, postmenopausal bleeding, menstrual pain (dymenorrhea, vaginal discharge)     Musculoskeletal: (bone pain/fracture, joint pain or swelling, musle pain)     Integumentary: (rashes, acne, non-healing sores, itching, breast lumps, breast pain, nipple discharge, hair loss)     Neurologic: (HA, muscle weakness, paresthesias (numbness, coldness, crawling or prickling), memory loss, seizure, dizziness)     Psychiatric:  (anxiety, sadness, irritability/anger, insomnia, suicidality)     Endocrine: (heat or cold intolerance, excessive thirst (polydipsia), excessive hunger (polyphagia))     Immune/Allergic: (hives, seasonal or environmental allergies, HIV exposure)     Hematologic/Lymphatic: (lymph node enlargement, easy bleeding or bruising)     History obtained via chart review and patient     PCP is SIDNEY BRANCH, DO         Current Meds:     Home Medications           Prior to Admission medications    Medication Sig Start Date End Date Taking? Authorizing Provider   DULoxetine (CYMBALTA) 20 MG extended release capsule Take 1 capsule by mouth daily 5/14/20   Yes Krishan Sports, APRN - NP   traZODone (DESYREL) 100 MG tablet Take 1.5 tablets by mouth nightly as needed for Sleep 5/14/20   Yes Krishan Sports, APRN - NP   hydrOXYzine (ATARAX) 25 MG tablet Take 1 tablet by mouth 3 times daily as needed for Anxiety 5/14/20   Yes Krishan Sports, APRN - NP   diazePAM (VALIUM) 2 MG tablet Take 1 tablet by mouth daily as needed for Anxiety or Sleep for up to 30 days. 4/22/20 5/22/20   ALESSANDRA Polk - NP   DULoxetine (CYMBALTA) 60 MG extended release capsule TAKE 1 CAPSULE BY MOUTH EVERY DAY 4/3/20     ALESSANDRA Polk NP   nystatin (MYCOSTATIN) 399322 UNIT/ML suspension Take 100,000 Units by mouth 4 times daily       Historical Provider, MD   gabapentin (NEURONTIN) 300 MG capsule TAKE 3 CAPSULES BY MOUTH EVERY 8 HOURS. Patient taking differently: Take 600 mg by mouth 3 times daily. 2/16/18 4/26/19   ALESSANDRA Morton   carisoprodol (SOMA) 250 MG tablet Take 350 mg by mouth three times daily       Historical Provider, MD   oxyCODONE-acetaminophen (PERCOCET)  MG per tablet every 3-4 hours as needed. . 1/27/16     Historical Provider, MD   propranolol (INDERAL LA) 80 MG CR capsule daily  2/19/16     Historical Provider, MD   CELEBREX 200 MG capsule 200 mg daily.  9/29/14     Historical Provider, MD        Social History   Social History            Socioeconomic History    Marital status:        Spouse name: None    Number of children: None    Years of education: None    Highest education level: None   Occupational History    None   Social Needs    Financial resource strain: None    Food insecurity       Worry: None       Inability: None    Transportation needs       Medical: None       Non-medical: None   Tobacco Use    Smoking status: Former Smoker       Packs/day: 1.50       Last attempt to quit: 11/4/2016       Years since quitting: 3.5    Smokeless tobacco: Never Used    Tobacco comment: pt is using ecigs   Substance and Sexual Activity    Alcohol use: No    Drug use: No    Sexual activity: None   Lifestyle    Physical activity       Days per week: None       Minutes per session: None    Stress: None   Relationships    Social connections       Talks on phone: None       Gets together: None       Attends Orthodox service: None       Active member of club or organization: None       Attends meetings of clubs or organizations: None     continues to bother her, she has flashbacks and nightmares about this. She says this happened about a month ago.           FAMILY PSYCHIATRIC HISTORY     Father, anxiety, depression, mood swings           MSE:  Patient is  A & O x 4. Appearance:  MARIA ESTHER  Cognition:  Recent memory intact , remote memory intact , good fund of knowledge, average  intelligence level. Speech:  normal  Language: Naming: intact;  Word Finding: intact  Conversation no evidence of delusions  Behavior:  Cooperative  Mood: \"decent\"  Affect: congruent with mood  Thought Content: negative delusions, negative hallucinations, negative obsessions,  negativehomicidal and negative suicidal   Thought Process: linear, goal directed and coherent  Associations: logical connections  Attention Span and concentration: Normal   Judgement Insight:  normal and appropriate  Gait and Station: MARIA ESTHER  Sleep: avg 7-8 hrs (unless she wakes up with a nightmare)   Appetite: ok              Lab Results   Component Value Date      04/26/2019     K 3.5 04/26/2019      04/26/2019     CO2 30 (H) 04/26/2019     BUN 7 04/26/2019     CREATININE 0.6 04/26/2019     GLUCOSE 68 (L) 04/26/2019     CALCIUM 9.5 04/26/2019     PROT 7.2 04/26/2019     LABALBU 4.1 04/26/2019     BILITOT <0.2 04/26/2019     ALKPHOS 70 04/26/2019     AST 22 04/26/2019     ALT 16 04/26/2019     LABGLOM >60 04/26/2019           Lab Results   Component Value Date      04/26/2019     K 3.5 04/26/2019      04/26/2019     CO2 30 04/26/2019     BUN 7 04/26/2019     CREATININE 0.6 04/26/2019     GLUCOSE 68 04/26/2019     CALCIUM 9.5 04/26/2019      No results found for: CHOL  No results found for: TRIG  No results found for: HDL  No results found for: LDLCHOLESTEROL, LDLCALC  No results found for: LABVLDL, VLDL  No results found for: CHOLHDLRATIO  No results found for: LABA1C  No results found for: EAG        Lab Results   Component Value Date     TSH 2.710 04/26/2019     No results found hydrOXYzine (ATARAX) 25 MG tablet       Sig: Take 1 tablet by mouth 3 times daily as needed for Anxiety       Dispense:  90 tablet       Refill:  3                      No orders of the defined types were placed in this encounter.       9. Additional comments: She reports that she increased Trazodone 100mg, to 1.5 tabs and that this has helped at night to make her sleep more soundly so that she doesn't wake up with nightmares. She says that with getting better sleep she is feeling a little better. She has been more recently traumatized by a skin condition that she thinks she got from a pet which . She has had doctors treating her for this. She had the condition for a year, it is now (as of 20) cleared up but she gets extremely anxious now when she sees white flecks on her skin thinking the condition may be returning and this is causing her 2-3 panic attacks during the week. Doctors never really identified what caused this. Her Valium has been decreased from 5mg daily to 2mg daily. Will increase Duloxetine to help with anxiety and depression. She likes Duloxetine because it also helps with back pain. She has increased her Trazodone in the last 3 days to 1.5 tabs to help with nightmares and she says this has helped. She didn't want to start a new medication (Prazosin). Will not make any further medication changes. Educated her about the dangers of vaping. She verbalized understanding. She says that she doesn't inhale. Will include education about vaping on her AVS. Will follow up in about 3 months.     10. Over 50% of the total visit time of   35  minutes was spent on counseling and/or coordination of care of:                         1. Moderate episode of recurrent major depressive disorder ([facility] [address]    2. Panic disorder    3. Insomnia, unspecified type    4.  PTSD (post-traumatic stress disorder)                       Psychotherapy Topics: mood/medication effectiveness         [facility] Instructions         Return in about 3 months (around 8/14/2020). Plan:     Decrease:  Diazepam, 2mg, daily as needed for anxiety (start taking 1/2 tab daily and then just adding the other half when you need it for anxiety)     Continue  Hydroxyzine, 25mg, take 1/2 -1 tab up to 3 times daily for anxiety as needed      Increase:  Trazodone, 100mg, take 1.5 tabs nightly as needed for sleep     Increase:  Duloxetine, 60mg, in the morning  Duloxetine, 20mg, in the evening (no later the evening meal)     (she is currently taking 60mg at bedtime and she will see what works best for her)     Follow up: Return in about 3 months (around 8/14/2020).    Patient Education        Learning About Vaping  What is it? Vaping is using a battery-powered device to inhale liquid nicotine or other substances. The devices can look like everyday items such as pens, cigarettes, or USB flash drives. Instead of tobacco, they use a blend of liquid nicotine, flavors, and other chemicals. This is called vaping liquid. It comes in different nicotine strengths. THC (a chemical in marijuana) products can also be used. Vapes are also called:  · E-cigarettes. · Vape pens. · Juuls. How do vapes work? Vapes make an aerosol cloud by heating vaping liquid or THC. You breathe in through the mouthpiece. This turns on the battery, which sotelo the heating element. This turns vaping liquid or THC into tiny particles suspended in gas, called an aerosol. What are the safety concerns? More research is needed before experts can tell if vaping is safe. The long-term health effects aren't known. Here's what experts are learning about vaping. It's not harmless water vapor. The \"vapor\" made by vaping isn't a vapor at all. It's an aerosol cloud made of chemicals suspended in a gas. This vape aerosol contains chemicals known to be harmful. It likely has nicotine. Nicotine is addictive.  It's harmful to developing brains, such as in unborn babies, children, and young adults up to age 22. Liquid nicotine can be lethal if swallowed. Keep it out of children's reach. It may cause a deadly lung disease. There have been outbreaks of lung disease and death related to vaping. Some sources call it vaping-related lung injury. Many of these may be from vaping products with THC. But the exact cause isn't known. How well does it work to help people stop smoking? More research is needed to know how well vaping works to help people stop smoking. Some people try to quit smoking by reducing the amount of nicotine they vape. They do this slowly over time. If you're thinking of using vaping to help you stop smoking, talk to your doctor first. Here are some other things to think about. It's not approved as a quit-smoking aid. The U.S. Food and Drug Administration and the Centers for Disease Control and Prevention don't recommend vaping to help quit smoking. Nicotine replacement patches or gums are preferred. Many people end up using both. This is called dual use. Some people choose to vape when they can't smoke. But they still smoke cigarettes. Where can you learn more? Go to https://[employer]. org and sign in to your [employer] account. Enter C351 in the ScanNano box to learn more about \"Learning About Vaping. \"     If you do not have an account, please click on the \"Sign Up Now\" link. Current as of: July 4, 2019Content Version: 12.4  © 7234-9934 Healthwise, Incorporated. Care instructions adapted under license by [facility] ([facility]).  If you have questions about a medical condition or this instruction, always ask your healthcare professional. [place] [address] any warranty or liability for your use of this information.                 Patient declined After Visit Summary   Additional Documentation     Encounter Info:     Billing Info,    Allergies,    Detailed Report,    History,    Medications,    Questionnaires       Chart Review Routing History     No routing history on file.    Encounter Status     Closed by Luane Libman, APRN - NP on 5/14/20 at 12:21    Orders Placed      None   Medication Changes         hydrOXYzine HCl 25 mg Oral 3 TIMES DAILY PRN       DULoxetine HCl          60 MG Cpep, TAKE 1 CAPSULE BY MOUTH EVERY DAY      Protocol Details       20 mg Oral DAILY       traZODone HCl 150 mg Oral NIGHTLY PRN       Medication List     Visit Diagnoses         Moderate episode of recurrent major depressive disorder ([facility] [address]      Panic disorder      Insomnia, unspecified type      PTSD (post-traumatic stress disorder)      Problem List

## 2020-08-12 ENCOUNTER — TELEPHONE (OUTPATIENT)
Dept: PSYCHIATRY | Age: 55
End: 2020-08-12

## 2020-08-12 NOTE — TELEPHONE ENCOUNTER
Returned call to pt regarding vm received asking to clarify when her appointment with provider; Nader APARICIO is. No answer, LVM for pt with info as requested, and encouraged to call back as/if needed.

## 2020-08-13 ENCOUNTER — VIRTUAL VISIT (OUTPATIENT)
Dept: PSYCHIATRY | Age: 55
End: 2020-08-13
Payer: MEDICAID

## 2020-08-13 PROCEDURE — 99443 PR PHYS/QHP TELEPHONE EVALUATION 21-30 MIN: CPT | Performed by: NURSE PRACTITIONER

## 2020-08-13 RX ORDER — TRAZODONE HYDROCHLORIDE 100 MG/1
150 TABLET ORAL NIGHTLY PRN
Qty: 135 TABLET | Refills: 1 | Status: SHIPPED | OUTPATIENT
Start: 2020-08-13 | End: 2020-12-03

## 2020-08-13 RX ORDER — DULOXETIN HYDROCHLORIDE 30 MG/1
30 CAPSULE, DELAYED RELEASE ORAL DAILY
Qty: 90 CAPSULE | Refills: 1 | Status: SHIPPED | OUTPATIENT
Start: 2020-08-13 | End: 2020-11-11 | Stop reason: SDUPTHER

## 2020-08-13 RX ORDER — DULOXETIN HYDROCHLORIDE 60 MG/1
CAPSULE, DELAYED RELEASE ORAL
Qty: 90 CAPSULE | Refills: 1 | Status: SHIPPED | OUTPATIENT
Start: 2020-08-13 | End: 2020-12-03 | Stop reason: DRUGHIGH
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
## 2020-09-17 RX ORDER — CLONAZEPAM 0.5 MG/1
0.25 TABLET ORAL DAILY PRN
Qty: 15 TABLET | Refills: 0 | Status: SHIPPED | OUTPATIENT
Start: 2020-09-17 | End: 2020-10-29

## 2020-09-18 ENCOUNTER — TELEPHONE (OUTPATIENT)
Dept: PSYCHIATRY | Age: 55
End: 2020-09-18

## 2020-10-28 ENCOUNTER — TELEPHONE (OUTPATIENT)
Dept: PSYCHIATRY | Age: 55
End: 2020-10-28

## 2020-10-28 NOTE — TELEPHONE ENCOUNTER
Pt called and wanted to know if we could take her of the clonazepam and put her back on Valium she was taking before she started coming to the office. I let her know that Katy Smith ws out of the office but I would be happy to send a message to the provider that was covering for her. Patient stated no that would be a waste of time then asked what I would do if I was her as far as having the office send a message to a provider that didn't know me. I told the patient I couldn't provide that information that all I could do was offer to send a message and she stated once again that would be a waste of time and said she would just keep taking the clonazepam and the call ended.

## 2020-10-29 ENCOUNTER — TELEPHONE (OUTPATIENT)
Dept: PSYCHIATRY | Age: 55
End: 2020-10-29

## 2020-11-03 ENCOUNTER — TELEPHONE (OUTPATIENT)
Dept: PSYCHIATRY | Age: 55
End: 2020-11-03
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
## 2020-11-03 NOTE — TELEPHONE ENCOUNTER
Pt called and stated that the pharmacy is saying they do not have a RX for the Klonopin. RX was sent electronically on 10/29/2020 per Dr Laura Carter and says received by the pharmacy. Pt says she took her last Klonopin last night. Called the [facility] pharmacy and spoke with [place] who initially said the RX was not received-she then said she saw it but needed the directions. She was informed that Dr Laura Carter sent in Klonopin 0.5 mg and wants direction of take one daily as needed for anxiety #10 for 30 days-no refills. Then spoke with pt to let her know that RX is at the pharmacy and reminded the pt that the RX is for #10 tab for 30 days. Discussed rationale of the decrease in the number of pills to include the possible negative effects of long term benzodiazepine use. Pt says she is having withdrawals from the decrease to include skin crawling, chills, itching, shaking all over and sick to her stomach. \"Can't take much more of it. Pushing me too hard to get off of it\". Pt informed that Dr Laura Carter would be given the info and she would receive a call back. Discussed can go to ER if needed for withdrawal-pt declined that she thought she needed to at this time.

## 2020-11-11 ENCOUNTER — TELEPHONE (OUTPATIENT)
Dept: PSYCHIATRY | Age: 55
End: 2020-11-11

## 2020-11-11 RX ORDER — DULOXETIN HYDROCHLORIDE 30 MG/1
30 CAPSULE, DELAYED RELEASE ORAL DAILY
Qty: 30 CAPSULE | Refills: 1 | Status: SHIPPED | OUTPATIENT
Start: 2020-11-11 | End: 2020-12-03 | Stop reason: SINTOL
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
## 2020-11-11 NOTE — TELEPHONE ENCOUNTER
Pt called stating that she cannot cut the Klonopin down as much as Dr Norbert Scott has ordered. Pt says she is still taking one pill daily but that means that she will run out too fast.  Pt says she is under stress. Pt stated she was having itching, anxiety attacks and constant diarrhea. Pt wanted Dr Norbert Scott informed of the above. Discussed with Dr Norbert Scott the above info and per her directions pt was told that she wants pt to remain on the same dose of Klonopin and only take it every couple of days as needed and no more than every other day. Pt informed that she could take the Atarax for itching and anxiety as per directions on her bottle. Pt stated that Atarax has not helped in the past-she was encouraged to try it again. Pt verbalized understanding of the above directives.

## 2020-11-13 ENCOUNTER — TELEPHONE (OUTPATIENT)
Dept: PSYCHIATRY | Age: 55
End: 2020-11-13

## 2020-11-13 NOTE — TELEPHONE ENCOUNTER
Pt called stating that [facility] says the request for Cymbalta 30 mg is pending. Called the pharmacy and told them a RX was sent on 11/11/2020 and shows it was received. Pharmacist said that RX was placed on hold because she had a refill on previous RX for 30 mg. He said the pt could  the RX later today. Pt was made aware.

## 2020-12-03 ENCOUNTER — TELEPHONE (OUTPATIENT)
Dept: PSYCHIATRY | Age: 55
End: 2020-12-03

## 2020-12-03 ENCOUNTER — VIRTUAL VISIT (OUTPATIENT)
Dept: PSYCHIATRY | Age: 55
End: 2020-12-03
Payer: MEDICAID

## 2020-12-03 PROCEDURE — 99448 NTRPROF PH1/NTRNET/EHR 21-30: CPT | Performed by: NURSE PRACTITIONER

## 2020-12-03 RX ORDER — TRAZODONE HYDROCHLORIDE 100 MG/1
100 TABLET ORAL 2 TIMES DAILY
Qty: 180 TABLET | Refills: 1
Start: 2020-12-03 | End: 2021-01-15 | Stop reason: SDUPTHER

## 2020-12-03 ASSESSMENT — PATIENT HEALTH QUESTIONNAIRE - PHQ9
SUM OF ALL RESPONSES TO PHQ9 QUESTIONS 1 & 2: 4
7. TROUBLE CONCENTRATING ON THINGS, SUCH AS READING THE NEWSPAPER OR WATCHING TELEVISION: 2
1. LITTLE INTEREST OR PLEASURE IN DOING THINGS: 2
8. MOVING OR SPEAKING SO SLOWLY THAT OTHER PEOPLE COULD HAVE NOTICED. OR THE OPPOSITE, BEING SO FIGETY OR RESTLESS THAT YOU HAVE BEEN MOVING AROUND A LOT MORE THAN USUAL: 2
6. FEELING BAD ABOUT YOURSELF - OR THAT YOU ARE A FAILURE OR HAVE LET YOURSELF OR YOUR FAMILY DOWN: 2
5. POOR APPETITE OR OVEREATING: 3
SUM OF ALL RESPONSES TO PHQ QUESTIONS 1-9: 16
3. TROUBLE FALLING OR STAYING ASLEEP: 0
4. FEELING TIRED OR HAVING LITTLE ENERGY: 3
9. THOUGHTS THAT YOU WOULD BE BETTER OFF DEAD, OR OF HURTING YOURSELF: 0
2. FEELING DOWN, DEPRESSED OR HOPELESS: 2
SUM OF ALL RESPONSES TO PHQ QUESTIONS 1-9: 16
10. IF YOU CHECKED OFF ANY PROBLEMS, HOW DIFFICULT HAVE THESE PROBLEMS MADE IT FOR YOU TO DO YOUR WORK, TAKE CARE OF THINGS AT HOME, OR GET ALONG WITH OTHER PEOPLE: 3
SUM OF ALL RESPONSES TO PHQ QUESTIONS 1-9: 16
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
## 2020-12-03 NOTE — PATIENT INSTRUCTIONS
Plan:  Continue:  Trazodone, 100mg, take twice daily    Taper and stop:  Duloxetine, 30mg, twice daily for 2 weeks  Duloxetine, 30mg, once daily for 1 week, then stop    Stop: Hydroxyzine    Stop: Clonazepam       Follow up: Return in about 4 weeks (around 12/31/2020). SAFETY PLAN    A suicide Safety Plan is a document that supports someone when they are having thoughts of suicide. Warning Signs that indicate a suicidal crisis may be developing: What (situations, thoughts, feelings, body sensations, behaviors, etc.) do you experience that lets you know you are beginning to think about suicide? 1. thoughts  2. Anxiety, feeling overwhelmed      Internal Coping Strategies:  What things can I do (relaxation techniques, hobbies, physical activities, etc.) to take my mind off my problems without contacting another person? 1. Sits down, closes her eyes,  2. Mindfulness      People and social settings that provide distraction: Who can I call or where can I go to distract me? 1. Name: Mom  Phone: in her phone  2. Name: Dad  Phone: in her phone   3. Place: Home            4. Place: Living Room    People whom I can ask for help: Who can I call when I need help - for example, friends, family, clergy, someone else? 1. Name: Mom                Phone: in her phone  2. Name: Dad  Phone: in her phone    Professionals or [address] [place] [address] I can contact during a crisis: Who can I call for help - for example, my doctor, my psychiatrist, my psychologist, a mental health provider, a suicide hotline? 1. Clinician Name: [facility]   Phone: [phone], opt 3      Clinician Pager or Emergency Contact #: [phone]    4. Clinician Name: [facility], [address] [place] [address]   Phone: [phone], opt 3      Clinician Pager or Emergency Contact #: [phone]    1. Suicide Prevention Lifeline: 7-806-033-TALK (5225)    4.  [address] [place] [address] Services -  for example, [place] suicide hotline, [facility] Hotline: 911      Emergency Services Address: [facility] [address]      Emergency Services Phone: [phone]    Making the environment safe: How can I make my environment (house/apartment/living space) safer? For example, can I remove guns, medications, and other items? 1.  Remove medications

## 2021-01-15 ENCOUNTER — TELEPHONE (OUTPATIENT)
Dept: PSYCHIATRY | Age: 56
End: 2021-01-15

## 2021-01-15 RX ORDER — PAROXETINE 10 MG/1
10 TABLET, FILM COATED ORAL NIGHTLY
Qty: 30 TABLET | Refills: 0 | Status: SHIPPED | OUTPATIENT
Start: 2021-01-15 | End: 2021-02-19 | Stop reason: SDUPTHER

## 2021-01-15 RX ORDER — TRAZODONE HYDROCHLORIDE 100 MG/1
100 TABLET ORAL DAILY
Qty: 90 TABLET | Refills: 0 | Status: SHIPPED | OUTPATIENT
Start: 2021-01-15 | End: 2021-02-19

## 2021-01-15 RX ORDER — DULOXETIN HYDROCHLORIDE 30 MG/1
CAPSULE, DELAYED RELEASE ORAL
Qty: 90 CAPSULE | Refills: 1 | OUTPATIENT
Start: 2021-01-15

## 2021-01-15 RX ORDER — DULOXETIN HYDROCHLORIDE 20 MG/1
20 CAPSULE, DELAYED RELEASE ORAL 2 TIMES DAILY
Qty: 60 CAPSULE | Refills: 0 | Status: SHIPPED | OUTPATIENT
Start: 2021-01-15 | End: 2021-03-11 | Stop reason: ALTCHOICE

## 2021-01-15 NOTE — TELEPHONE ENCOUNTER
1/15/21 3605    Called the patient to discuss tapering Cymbalta. She reports that she is having nerve tingling and has been experiencing this since we started tapering it down from 90mg. She has been taking Trazodone 300mg. Will reduce Trazodone to 100mg daily, reduce Duloxetine to 20mg BID, and will start Paxil, 10mg, nightly. As we taper Duloxetine, will increase Paxil.  She reports that her anxiety is really high and that Paxil has helped in the past.    ALESSANDRA Moe-NP

## 2021-01-18 ENCOUNTER — TELEPHONE (OUTPATIENT)
Dept: PSYCHIATRY | Age: 56
End: 2021-01-18

## 2021-02-08 RX ORDER — PAROXETINE 10 MG/1
10 TABLET, FILM COATED ORAL NIGHTLY
Qty: 90 TABLET | Refills: 0 | Status: CANCELLED | OUTPATIENT
Start: 2021-02-08 | End: 2021-05-09

## 2021-02-08 RX ORDER — DULOXETIN HYDROCHLORIDE 20 MG/1
20 CAPSULE, DELAYED RELEASE ORAL 2 TIMES DAILY
Qty: 180 CAPSULE | Refills: 0 | Status: CANCELLED | OUTPATIENT
Start: 2021-02-08 | End: 2021-05-09
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
## 2021-02-19 ENCOUNTER — TELEPHONE (OUTPATIENT)
Dept: PSYCHIATRY | Age: 56
End: 2021-02-19

## 2021-02-19 RX ORDER — BUSPIRONE HYDROCHLORIDE 10 MG/1
10 TABLET ORAL 3 TIMES DAILY
Qty: 90 TABLET | Refills: 3 | Status: SHIPPED | OUTPATIENT
Start: 2021-02-19 | End: 2021-03-11 | Stop reason: ALTCHOICE

## 2021-02-19 RX ORDER — TRAZODONE HYDROCHLORIDE 100 MG/1
50 TABLET ORAL DAILY
Qty: 90 TABLET | Refills: 0
Start: 2021-02-19 | End: 2021-03-23 | Stop reason: SDUPTHER

## 2021-02-19 RX ORDER — PAROXETINE HYDROCHLORIDE 20 MG/1
20 TABLET, FILM COATED ORAL NIGHTLY
Qty: 60 TABLET | Refills: 3 | Status: SHIPPED | OUTPATIENT
Start: 2021-02-19 | End: 2021-04-13

## 2021-02-19 NOTE — TELEPHONE ENCOUNTER
2/19/21 1720    Returned the patient's call. She reports that she continues to have nerve tingling from decreasing Duloxetine. Will increase Paxil to 20mg, daily. Will add Buspirone, 10mg, three times daily as needed to try as a replacement for Trazodone which she has been using to help her with the pins/needles feeling from decreasing Duloxetine. (She has not been able to take Prozac in the past). She will contact this provider early next week to let this provider know if the increase in Paxil was helpful and if adding Buspar was helpful.     ALESSANDRA Moe-NP

## 2021-03-11 ENCOUNTER — VIRTUAL VISIT (OUTPATIENT)
Dept: PSYCHIATRY | Age: 56
End: 2021-03-11
Payer: MEDICAID

## 2021-03-11 ENCOUNTER — TELEPHONE (OUTPATIENT)
Dept: PSYCHIATRY | Age: 56
End: 2021-03-11

## 2021-03-11 DIAGNOSIS — F41.1 GAD (GENERALIZED ANXIETY DISORDER): ICD-10-CM

## 2021-03-11 DIAGNOSIS — M79.2 NEURALGIA: ICD-10-CM

## 2021-03-11 DIAGNOSIS — F39 MOOD DISORDER (HCC): Primary | ICD-10-CM

## 2021-03-11 PROCEDURE — 99443 PR PHYS/QHP TELEPHONE EVALUATION 21-30 MIN: CPT | Performed by: NURSE PRACTITIONER

## 2021-03-11 RX ORDER — LAMOTRIGINE 25 MG/1
TABLET ORAL
Qty: 60 TABLET | Refills: 3 | Status: SHIPPED | OUTPATIENT
Start: 2021-03-11 | End: 2021-05-03 | Stop reason: SINTOL

## 2021-03-11 RX ORDER — IRBESARTAN 75 MG/1
75 TABLET ORAL NIGHTLY
COMMUNITY
End: 2021-09-16

## 2021-03-11 RX ORDER — LEVETIRACETAM 250 MG/1
250 TABLET ORAL 2 TIMES DAILY
COMMUNITY
End: 2021-07-08

## 2021-03-11 RX ORDER — GABAPENTIN 300 MG/1
300 CAPSULE ORAL 3 TIMES DAILY
Qty: 90 CAPSULE | Refills: 0 | Status: SHIPPED | OUTPATIENT
Start: 2021-03-11 | End: 2021-04-01 | Stop reason: SDUPTHER

## 2021-03-11 ASSESSMENT — PATIENT HEALTH QUESTIONNAIRE - PHQ9
2. FEELING DOWN, DEPRESSED OR HOPELESS: 1
6. FEELING BAD ABOUT YOURSELF - OR THAT YOU ARE A FAILURE OR HAVE LET YOURSELF OR YOUR FAMILY DOWN: 0
3. TROUBLE FALLING OR STAYING ASLEEP: 3
9. THOUGHTS THAT YOU WOULD BE BETTER OFF DEAD, OR OF HURTING YOURSELF: 0
4. FEELING TIRED OR HAVING LITTLE ENERGY: 3

## 2021-03-11 ASSESSMENT — ANXIETY QUESTIONNAIRES
6. BECOMING EASILY ANNOYED OR IRRITABLE: 1-SEVERAL DAYS
1. FEELING NERVOUS, ANXIOUS, OR ON EDGE: 2-OVER HALF THE DAYS
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
## 2021-03-11 NOTE — TELEPHONE ENCOUNTER
Pt called to verify her appt. Today.     Electronically signed by Cale Hoyt MA on 3/11/2021 at 11:01 AM

## 2021-03-11 NOTE — PATIENT INSTRUCTIONS
Plan:  Continue:  Trazodone, 100mg, take twice daily (cut this back as you are able)  Paxil, 20mg, nightly (started on a phone call)    Stopped:  Duloxetine, Buspar    Start: Gabapentin, 300mg, three times daily    Lamictal Cautions    Lamictal in rare occasions may cause a life threatening rash called Perkins-Homer Syndrome. If you develop a rash, experience any swelling of the tongue, lips or eyes, then stop taking the medication. If the condition persists after stopping the medication, then go to an Urgent Care or to an Emergency Department to be seen and let them know that you have been taking Lamictal.     To start Lamictal:  Lamictal, 25mg, take 1 tab daily for 2 weeks, then increase to 2 tabs daily for a 50mg dose         Follow up: Return in about 4 weeks (around 4/8/2021). SAFETY PLAN    A suicide Safety Plan is a document that supports someone when they are having thoughts of suicide. Warning Signs that indicate a suicidal crisis may be developing: What (situations, thoughts, feelings, body sensations, behaviors, etc.) do you experience that lets you know you are beginning to think about suicide? 1. thoughts  2. Anxiety, feeling overwhelmed      Internal Coping Strategies:  What things can I do (relaxation techniques, hobbies, physical activities, etc.) to take my mind off my problems without contacting another person? 1. Sits down, closes her eyes,  2. Mindfulness      People and social settings that provide distraction: Who can I call or where can I go to distract me? 1. Name: Mom  Phone: in her phone  2. Name: Dad  Phone: in her phone   3. Place: Home            4. Place: Living Room    People whom I can ask for help: Who can I call when I need help - for example, friends, family, clergy, someone else? 1. Name: Mom                Phone: in her phone  2.  Name: Dad  Phone: in her phone    Professionals or [address] [place] [address] I can contact during a crisis: Who can I call for help - for example, my doctor, my psychiatrist, my psychologist, a mental health provider, a suicide hotline? 1. Clinician Name: [facility] ([facility] [place]   Phone: [phone], opt 3      Clinician Pager or Emergency Contact #: [phone]    9. Clinician Name: [facility]   Phone: [phone], opt 3      Clinician Pager or Emergency Contact #: [phone]    5. Suicide Prevention Lifeline: 0-762-232-TALK (4898)    4. [address] [place] [address] Emergency Services -  for example, [facility] suicide hotline, [address] [place] [address] Avenue: [facility]      Emergency Services Address: [facility]      Emergency Services Phone: [phone]    Making the environment safe: How can I make my environment (house/apartment/living space) safer? For example, can I remove guns, medications, and other items? 1.  Remove medications

## 2021-03-17 ENCOUNTER — TELEPHONE (OUTPATIENT)
Dept: PSYCHIATRY | Age: 56
End: 2021-03-17

## 2021-03-17 NOTE — TELEPHONE ENCOUNTER
3/17/21 1825    Patient called today and reported to staff that she continues to have unending pins and needles even after starting Gabapentin. Called the patient to discuss. She reports that she had several days of relief after starting Gabapentin but the pins and needles have started back up and is about half the severity of where it was. She will increase the dose of Gabapentin to 300mg BID, am and midday, and 600mg at bedtime. She will call when she is close to running out and this provider will reorder early at the new dose. She verbalized understanding.     Minerva Boo, APRN-NP

## 2021-03-22 NOTE — TELEPHONE ENCOUNTER
Received a 90 day supply from [employer]  in regards to Buspar, medication was discontinued on 3/11/21 by provider

## 2021-03-23 ENCOUNTER — TELEPHONE (OUTPATIENT)
Dept: PSYCHIATRY | Age: 56
End: 2021-03-23

## 2021-03-23 RX ORDER — TRAZODONE HYDROCHLORIDE 100 MG/1
50 TABLET ORAL 3 TIMES DAILY
Qty: 90 TABLET | Refills: 1 | Status: SHIPPED | OUTPATIENT
Start: 2021-03-23 | End: 2021-04-21 | Stop reason: ALTCHOICE

## 2021-03-23 NOTE — TELEPHONE ENCOUNTER
3/23/21 6865    Returned the patient's phone call. She reports that she is completely out of Trazodone. She is taking it to relieve nerve tingling which it does help in addition to Gabapentin. Will reorder Trazodone. She is taking Gabapentin currently at 300mg, am, 300mg, pm and 600mg HS. She will need an earlier refill and will call the office when she is closer to being out.     ALESSANDRA Matthews-NP

## 2021-04-01 ENCOUNTER — TELEPHONE (OUTPATIENT)
Dept: PSYCHIATRY | Age: 56
End: 2021-04-01

## 2021-04-01 DIAGNOSIS — M79.2 NEURALGIA: ICD-10-CM

## 2021-04-01 RX ORDER — GABAPENTIN 300 MG/1
CAPSULE ORAL
Qty: 120 CAPSULE | Refills: 0 | Status: SHIPPED | OUTPATIENT
Start: 2021-04-01 | End: 2021-04-21
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
## 2021-04-01 NOTE — TELEPHONE ENCOUNTER
Pt made aware that RX for Gabapentin had been sent to her pharmacy. Spoke with the pharmacist who says they can refill early due to the dose increase.

## 2021-04-02 ENCOUNTER — TELEPHONE (OUTPATIENT)
Dept: PSYCHIATRY | Age: 56
End: 2021-04-02

## 2021-04-02 NOTE — TELEPHONE ENCOUNTER
Pt called stating that she cannot get the Gabapentin refilled. RX was sent yesterday with the increased dose. Pt ran out early of previous RX due to the increase on 3/17/2021. Spoke with pharmacist at [facility] yesterday that said that they would fill it. Pt says she could not get it filled today. Spoke with male pharmacist today who says the insurance would not fill early but they had an assistant card and pt could get the med for $17.32. Pt given the info and says she will go  the med now.

## 2021-04-12 DIAGNOSIS — Z79.899 HIGH RISK MEDICATION USE: Primary | ICD-10-CM

## 2021-04-13 ENCOUNTER — VIRTUAL VISIT (OUTPATIENT)
Dept: PSYCHIATRY | Age: 56
End: 2021-04-13
Payer: MEDICAID

## 2021-04-13 ENCOUNTER — TELEPHONE (OUTPATIENT)
Dept: PSYCHIATRY | Age: 56
End: 2021-04-13

## 2021-04-13 DIAGNOSIS — F41.1 GAD (GENERALIZED ANXIETY DISORDER): ICD-10-CM

## 2021-04-13 DIAGNOSIS — F39 MOOD DISORDER (HCC): Primary | ICD-10-CM

## 2021-04-13 DIAGNOSIS — F99 INSOMNIA DUE TO OTHER MENTAL DISORDER: ICD-10-CM

## 2021-04-13 DIAGNOSIS — F51.05 INSOMNIA DUE TO OTHER MENTAL DISORDER: ICD-10-CM

## 2021-04-13 PROCEDURE — 99443 PR PHYS/QHP TELEPHONE EVALUATION 21-30 MIN: CPT | Performed by: NURSE PRACTITIONER

## 2021-04-13 RX ORDER — PAROXETINE HYDROCHLORIDE 20 MG/1
10 TABLET, FILM COATED ORAL NIGHTLY
Qty: 30 TABLET | Refills: 1
Start: 2021-04-13 | End: 2021-04-21 | Stop reason: ALTCHOICE

## 2021-04-13 RX ORDER — ARIPIPRAZOLE 5 MG/1
TABLET ORAL
Qty: 30 TABLET | Refills: 3 | Status: SHIPPED | OUTPATIENT
Start: 2021-04-13 | End: 2021-04-19 | Stop reason: SINTOL

## 2021-04-13 ASSESSMENT — PATIENT HEALTH QUESTIONNAIRE - PHQ9
3. TROUBLE FALLING OR STAYING ASLEEP: 2
SUM OF ALL RESPONSES TO PHQ QUESTIONS 1-9: 12
1. LITTLE INTEREST OR PLEASURE IN DOING THINGS: 2
SUM OF ALL RESPONSES TO PHQ9 QUESTIONS 1 & 2: 3
6. FEELING BAD ABOUT YOURSELF - OR THAT YOU ARE A FAILURE OR HAVE LET YOURSELF OR YOUR FAMILY DOWN: 0
2. FEELING DOWN, DEPRESSED OR HOPELESS: 1
SUM OF ALL RESPONSES TO PHQ QUESTIONS 1-9: 12
5. POOR APPETITE OR OVEREATING: 1
4. FEELING TIRED OR HAVING LITTLE ENERGY: 2

## 2021-04-13 ASSESSMENT — ANXIETY QUESTIONNAIRES
7. FEELING AFRAID AS IF SOMETHING AWFUL MIGHT HAPPEN: 2-OVER HALF THE DAYS
1. FEELING NERVOUS, ANXIOUS, OR ON EDGE: 3-NEARLY EVERY DAY
GAD7 TOTAL SCORE: 16
2. NOT BEING ABLE TO STOP OR CONTROL WORRYING: 2-OVER HALF THE DAYS
5. BEING SO RESTLESS THAT IT IS HARD TO SIT STILL: 3-NEARLY EVERY DAY
4. TROUBLE RELAXING: 3-NEARLY EVERY DAY
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
## 2021-04-13 NOTE — TELEPHONE ENCOUNTER
Called pt to get checked in for appt.     Electronically signed by Helen Roberts MA on 4/13/2021 at 1:21 PM

## 2021-04-13 NOTE — PATIENT INSTRUCTIONS
Plan:  Continue:  Trazodone, 100mg, take 1/2 - 1 tab nightly  Gabapentin, 300mg, twice daily and 600mg nightly    Stop: Lamotrigine    Decrease:  Paxil, 20mg, take 1/2 tab nightly    Start: Abilify, 5mg, take 1/2 tab daily for 2 weeks, then increase to 1 whole tab daily    Please come to the lab for a Urine Drug Screen (must have on file annually while you are taking Gabapentin)       Follow up: Return in about 2 weeks (around 4/27/2021). SAFETY PLAN    A suicide Safety Plan is a document that supports someone when they are having thoughts of suicide. Warning Signs that indicate a suicidal crisis may be developing: What (situations, thoughts, feelings, body sensations, behaviors, etc.) do you experience that lets you know you are beginning to think about suicide? 1. thoughts  2. Anxiety, feeling overwhelmed      Internal Coping Strategies:  What things can I do (relaxation techniques, hobbies, physical activities, etc.) to take my mind off my problems without contacting another person? 1. Sits down, closes her eyes,  2. Mindfulness      People and social settings that provide distraction: Who can I call or where can I go to distract me? 1. Name: Mom  Phone: in her phone  2. Name: Dad  Phone: in her phone   3. Place: Home            4. Place: Living Room    People whom I can ask for help: Who can I call when I need help - for example, friends, family, clergy, someone else? 1. Name: Mom                Phone: in her phone  2. Name: Dad  Phone: in her phone    Professionals or [address] [place] [address] I can contact during a crisis: Who can I call for help - for example, my doctor, my psychiatrist, my psychologist, a mental health provider, a suicide hotline? 1. [facility]   Phone: [phone], opt 3      Clinician Pager or Emergency Contact #: [phone]    2.  [facility]   Phone: [phone], opt 3      Clinician Pager or Emergency Contact #: [phone]

## 2021-04-19 ENCOUNTER — TELEPHONE (OUTPATIENT)
Dept: PSYCHIATRY | Age: 56
End: 2021-04-19

## 2021-04-19 NOTE — TELEPHONE ENCOUNTER
Edith APARICIO made aware of pt c/o as documented in phone call today. She wants the pt to stop Abilify-pt made aware and is agreeable. Appt staff will call pt in the am to schedule an appt this week.

## 2021-04-19 NOTE — TELEPHONE ENCOUNTER
Pt called stating that she tried a new anxiety meds last week. She states she is having a lot of stress related to health issues of both parents. Pt stated that the Aripiprazole is \"driving med crazy\". Pt reports trouble breathing related to tightness that she says is related to anxiety. Pt reports that she feels like her face is drawing but does not look like it is when she looks in the mirror. \"It's more like a feeling\". Pt says that she can't sit still. Pt says she is totally off the Trazodone and can't sleep. Pt was calm and speech clear during this phone conversation. Pt informed that [address] [place] [address] would be given the above info today.

## 2021-04-20 ENCOUNTER — TELEPHONE (OUTPATIENT)
Dept: PSYCHIATRY | Age: 56
End: 2021-04-20

## 2021-04-20 NOTE — TELEPHONE ENCOUNTER
Called pt to schedule an earler appt with Tk. Scheduled her for 4/21.     Electronically signed by Mini Garsia MA on 4/20/2021 at 10:26 AM

## 2021-04-21 ENCOUNTER — TELEPHONE (OUTPATIENT)
Dept: PSYCHIATRY | Age: 56
End: 2021-04-21

## 2021-04-21 ENCOUNTER — VIRTUAL VISIT (OUTPATIENT)
Dept: PSYCHIATRY | Age: 56
End: 2021-04-21
Payer: MEDICAID

## 2021-04-21 DIAGNOSIS — F41.1 GAD (GENERALIZED ANXIETY DISORDER): ICD-10-CM

## 2021-04-21 DIAGNOSIS — F51.05 INSOMNIA DUE TO OTHER MENTAL DISORDER: ICD-10-CM

## 2021-04-21 DIAGNOSIS — M79.2 NEURALGIA: ICD-10-CM

## 2021-04-21 DIAGNOSIS — F39 MOOD DISORDER (HCC): Primary | ICD-10-CM

## 2021-04-21 DIAGNOSIS — F99 INSOMNIA DUE TO OTHER MENTAL DISORDER: ICD-10-CM

## 2021-04-21 PROCEDURE — 99443 PR PHYS/QHP TELEPHONE EVALUATION 21-30 MIN: CPT | Performed by: NURSE PRACTITIONER

## 2021-04-21 RX ORDER — GABAPENTIN 300 MG/1
CAPSULE ORAL
Qty: 150 CAPSULE | Refills: 0 | Status: SHIPPED | OUTPATIENT
Start: 2021-04-21 | End: 2021-05-27 | Stop reason: SDUPTHER

## 2021-04-21 ASSESSMENT — PATIENT HEALTH QUESTIONNAIRE - PHQ9
5. POOR APPETITE OR OVEREATING: 1
SUM OF ALL RESPONSES TO PHQ QUESTIONS 1-9: 8
1. LITTLE INTEREST OR PLEASURE IN DOING THINGS: 1
4. FEELING TIRED OR HAVING LITTLE ENERGY: 1
7. TROUBLE CONCENTRATING ON THINGS, SUCH AS READING THE NEWSPAPER OR WATCHING TELEVISION: 2

## 2021-04-21 ASSESSMENT — ANXIETY QUESTIONNAIRES
5. BEING SO RESTLESS THAT IT IS HARD TO SIT STILL: 3-NEARLY EVERY DAY
2. NOT BEING ABLE TO STOP OR CONTROL WORRYING: 2-OVER HALF THE DAYS
1. FEELING NERVOUS, ANXIOUS, OR ON EDGE: 2-OVER HALF THE DAYS
3. WORRYING TOO MUCH ABOUT DIFFERENT THINGS: 2-OVER HALF THE DAYS

## 2021-04-21 NOTE — PROGRESS NOTES
vitals taken for this visit.       Review of Systems - 14 point review:  Negative except being treated for: insomnia, anxiety, depression       Constitutional: (fevers, chills, night sweats, wt loss/gain, change in appetite, fatigue, somnolence)    HEENT: (ear pain or discharge, hearing loss, ear ringing, sinus pressure, nosebleed, nasal discharge, sore throat, oral sores, tooth pain, bleeding gums, hoarse voice, neck pain)      Cardiovascular: (HTN, chest pain, elevated cholesterol/lipids, palpitations, leg swelling, leg pain with walking)    Respiratory: (cough, wheezing, snoring, SOB with activity (dyspnea), SOB while lying flat (orthopnea), awakening with severe SOB (paroxysmal nocturnal dyspnea))    Gastrointestinal: (NVD, constipation, abdominal pain, bright red stools, black tarry stools, stool incontinence)     Genitourinary:  (pelvic pain, burning or frequency of urination, urinary urgency, blood in urine incomplete bladder emptying, urinary incontinence, STD; MEN: testicular pain or swelling, erectile dysfunction; WOMEN: LMP, heavy menstrual bleeding (menorrhagia), irregular periods, postmenopausal bleeding, menstrual pain (dymenorrhea, vaginal discharge)    Musculoskeletal: (bone pain/fracture, joint pain or swelling, musle pain)    Integumentary: (rashes, acne, non-healing sores, itching, breast lumps, breast pain, nipple discharge, hair loss)    Neurologic: (HA, muscle weakness, paresthesias (numbness, coldness, crawling or prickling), memory loss, seizure, dizziness)    Psychiatric:  (anxiety, sadness, irritability/anger, insomnia, suicidality)    Endocrine: (heat or cold intolerance, excessive thirst (polydipsia), excessive hunger (polyphagia))    Immune/Allergic: (hives, seasonal or environmental allergies, HIV exposure)    Hematologic/Lymphatic: (lymph node enlargement, easy bleeding or bruising)    History obtained via chart review and patient    [facility]       Current Meds:    Prior Component Value Date     04/26/2019    K 3.5 04/26/2019     04/26/2019    CO2 30 (H) 04/26/2019    BUN 7 04/26/2019    CREATININE 0.6 04/26/2019    GLUCOSE 68 (L) 04/26/2019    CALCIUM 9.5 04/26/2019    PROT 7.2 04/26/2019    LABALBU 4.1 04/26/2019    BILITOT <0.2 04/26/2019    ALKPHOS 70 04/26/2019    AST 22 04/26/2019    ALT 16 04/26/2019    LABGLOM >60 04/26/2019     Lab Results   Component Value Date     04/26/2019    K 3.5 04/26/2019     04/26/2019    CO2 30 04/26/2019    BUN 7 04/26/2019    CREATININE 0.6 04/26/2019    GLUCOSE 68 04/26/2019    CALCIUM 9.5 04/26/2019      No results found for: CHOL  No results found for: TRIG  No results found for: HDL  No results found for: LDLCHOLESTEROL, LDLCALC  No results found for: LABVLDL, VLDL  No results found for: CHOLHDLRATIO  No results found for: LABA1C  No results found for: EAG  Lab Results   Component Value Date    TSH 2.710 04/26/2019     No results found for: VITD25    Assessments Administered:    PHQ9: 8, mild  GAD7: 15,  severe    Assessment:   1. Mood disorder ([address]    2. INNA (generalized anxiety disorder)    3. Insomnia due to other mental disorder    4. Neuralgia         R/O Bipolar Disorder  R/O MDD    Plan:  Increase:  Gabapentin, 300mg, twice daily and 600mg nightly    Stop: Paxil, 20mg, take 1/2 tab nightly    Stopped: Abilify    Lamictal Cautions    Lamictal in rare occasions may cause a life threatening rash called Perkins-Homer Syndrome. If you develop a rash, experience any swelling of the tongue, lips or eyes, then stop taking the medication.  If the condition persists after stopping the medication, then go to an Urgent Care or to an Emergency Department to be seen and let them know that you have been taking Lamictal.     To start Lamictal:  Lamictal, 25mg, take 1 tab daily for 2 weeks, then increase to 2 tabs daily for a 50mg dose    Please come to the lab for a Urine Drug Screen (must have on file annually while you are taking Gabapentin)         Follow up: Return in about 2 weeks (around 5/5/2021). SAFETY PLAN    A suicide Safety Plan is a document that supports someone when they are having thoughts of suicide. Warning Signs that indicate a suicidal crisis may be developing: What (situations, thoughts, feelings, body sensations, behaviors, etc.) do you experience that lets you know you are beginning to think about suicide? 1. thoughts  2. Anxiety, feeling overwhelmed      Internal Coping Strategies:  What things can I do (relaxation techniques, hobbies, physical activities, etc.) to take my mind off my problems without contacting another person? 1. Sits down, closes her eyes,  2. Mindfulness      People and social settings that provide distraction: Who can I call or where can I go to distract me? 1. Name: Mom  Phone: in her phone  2. Name: Dad  Phone: in her phone   3. Place: Home            4. Place: Living Room    People whom I can ask for help: Who can I call when I need help - for example, friends, family, clergy, someone else? 1. Name: Mom                Phone: in her phone  2. Name: Dad  Phone: in her phone    Professionals or [address] [place] [address] I can contact during a crisis: Who can I call for help - for example, my doctor, my psychiatrist, my psychologist, a mental health provider, a suicide hotline? 1. Clinician Name: [facility]   Phone: [phone], opt 3      Clinician Pager or Emergency Contact #: [phone]    8. Clinician Name: [facility], [address] [place] [address]   Phone: [phone], opt 3      Clinician Pager or Emergency Contact #: [phone]    6. Suicide Prevention Lifeline: 5-431-633-TALK (2228)    4. [address] [place] [address] Emergency Services -  for example, [facility] suicide hotline, [address] [place] [address] Avenue: [facility]      Emergency Services Address: [facility] [address]      Emergency Services Phone: [phone]    Making the environment safe:  How can I make my environment Neuralgia                      Psychotherapy Topics: mood/medication effectiveness       Regi Rocha PMHNP-BC

## 2021-04-21 NOTE — PATIENT INSTRUCTIONS
Plan:  Increase:  Gabapentin, 300mg, twice daily and 600mg nightly    Stop: Paxil, 20mg, take 1/2 tab nightly    Stopped: Abilify    Lamictal Cautions    Lamictal in rare occasions may cause a life threatening rash called Perkins-Homer Syndrome. If you develop a rash, experience any swelling of the tongue, lips or eyes, then stop taking the medication. If the condition persists after stopping the medication, then go to an Urgent Care or to an Emergency Department to be seen and let them know that you have been taking Lamictal.     To start Lamictal:  Lamictal, 25mg, take 1 tab daily for 2 weeks, then increase to 2 tabs daily for a 50mg dose    Please come to the lab for a Urine Drug Screen (must have on file annually while you are taking Gabapentin)         Follow up: Return in about 2 weeks (around 5/5/2021). SAFETY PLAN    A suicide Safety Plan is a document that supports someone when they are having thoughts of suicide. Warning Signs that indicate a suicidal crisis may be developing: What (situations, thoughts, feelings, body sensations, behaviors, etc.) do you experience that lets you know you are beginning to think about suicide? 1. thoughts  2. Anxiety, feeling overwhelmed      Internal Coping Strategies:  What things can I do (relaxation techniques, hobbies, physical activities, etc.) to take my mind off my problems without contacting another person? 1. Sits down, closes her eyes,  2. Mindfulness      People and social settings that provide distraction: Who can I call or where can I go to distract me? 1. Name: Mom  Phone: in her phone  2. Name: Dad  Phone: in her phone   3. Place: Home            4. Place: Living Room    People whom I can ask for help: Who can I call when I need help - for example, friends, family, clergy, someone else? 1. Name: Mom                Phone: in her phone  2.  Name: Dad  Phone: in her phone    Professionals or [employer] agencies I can contact during a crisis: Who can I call for help - for example, my doctor, my psychiatrist, my psychologist, a mental health provider, a suicide hotline? 1. Clinician Name: [address] [place] [address], [facility]   Phone: [phone], opt 3      Clinician Pager or Emergency Contact #: [phone]    8. Clinician Name: [address] [place] [address], [address]   Phone: [phone], opt 3      Clinician Pager or Emergency Contact #: [phone]    4. Suicide Prevention Lifeline: 4-249-249-TALK (3949)    4. [address] [place] [address] Emergency Services -  for example, [facility] suicide hotline, [address] [place] [address] Avenue: [facility]      Emergency Services Address: [address] [place] [address]      Emergency Services Phone: [phone]    Making the environment safe: How can I make my environment (house/apartment/living space) safer? For example, can I remove guns, medications, and other items? 1.  Remove medications

## 2021-04-26 ENCOUNTER — TELEPHONE (OUTPATIENT)
Dept: PSYCHIATRY | Age: 56
End: 2021-04-26

## 2021-04-26 NOTE — TELEPHONE ENCOUNTER
Pt reminded of the need for an UDS as ordered per [address] [facility]. Pt said she would get it this week.

## 2021-05-03 ENCOUNTER — VIRTUAL VISIT (OUTPATIENT)
Dept: PSYCHIATRY | Age: 56
End: 2021-05-03
Payer: MEDICAID

## 2021-05-03 ENCOUNTER — TELEPHONE (OUTPATIENT)
Dept: PSYCHIATRY | Age: 56
End: 2021-05-03

## 2021-05-03 DIAGNOSIS — Z79.899 HIGH RISK MEDICATION USE: ICD-10-CM

## 2021-05-03 DIAGNOSIS — F41.0 GENERALIZED ANXIETY DISORDER WITH PANIC ATTACKS: ICD-10-CM

## 2021-05-03 DIAGNOSIS — F99 INSOMNIA DUE TO OTHER MENTAL DISORDER: ICD-10-CM

## 2021-05-03 DIAGNOSIS — F51.05 INSOMNIA DUE TO OTHER MENTAL DISORDER: ICD-10-CM

## 2021-05-03 DIAGNOSIS — F41.1 GENERALIZED ANXIETY DISORDER WITH PANIC ATTACKS: ICD-10-CM

## 2021-05-03 DIAGNOSIS — F39 MOOD DISORDER (HCC): Primary | ICD-10-CM

## 2021-05-03 PROCEDURE — 99213 OFFICE O/P EST LOW 20 MIN: CPT | Performed by: NURSE PRACTITIONER

## 2021-05-03 RX ORDER — DIVALPROEX SODIUM 250 MG/1
250 TABLET, DELAYED RELEASE ORAL 2 TIMES DAILY
Qty: 60 TABLET | Refills: 3 | Status: SHIPPED | OUTPATIENT
Start: 2021-05-03 | End: 2021-06-09 | Stop reason: ALTCHOICE

## 2021-05-03 ASSESSMENT — PATIENT HEALTH QUESTIONNAIRE - PHQ9
3. TROUBLE FALLING OR STAYING ASLEEP: 3
1. LITTLE INTEREST OR PLEASURE IN DOING THINGS: 1
SUM OF ALL RESPONSES TO PHQ QUESTIONS 1-9: 15
2. FEELING DOWN, DEPRESSED OR HOPELESS: 2
6. FEELING BAD ABOUT YOURSELF - OR THAT YOU ARE A FAILURE OR HAVE LET YOURSELF OR YOUR FAMILY DOWN: 1
9. THOUGHTS THAT YOU WOULD BE BETTER OFF DEAD, OR OF HURTING YOURSELF: 0
8. MOVING OR SPEAKING SO SLOWLY THAT OTHER PEOPLE COULD HAVE NOTICED. OR THE OPPOSITE, BEING SO FIGETY OR RESTLESS THAT YOU HAVE BEEN MOVING AROUND A LOT MORE THAN USUAL: 3

## 2021-05-03 ASSESSMENT — ANXIETY QUESTIONNAIRES
1. FEELING NERVOUS, ANXIOUS, OR ON EDGE: 3-NEARLY EVERY DAY
GAD7 TOTAL SCORE: 18
5. BEING SO RESTLESS THAT IT IS HARD TO SIT STILL: 3-NEARLY EVERY DAY
4. TROUBLE RELAXING: 3-NEARLY EVERY DAY
3. WORRYING TOO MUCH ABOUT DIFFERENT THINGS: 3-NEARLY EVERY DAY
2. NOT BEING ABLE TO STOP OR CONTROL WORRYING: 3-NEARLY EVERY DAY
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
## 2021-05-03 NOTE — TELEPHONE ENCOUNTER
Called pt to get checked in for appt.     Electronically signed by Lindsey Wolfe MA on 5/3/2021 at 1:50 PM

## 2021-05-03 NOTE — TELEPHONE ENCOUNTER
Pt called stating that she needed to talk with [address] [place] [address] today if possible. She reports that she has stopped the Lamictal 5 days ago due to [facility] heart palpitations\". Pt reports a hx of PVCs. Pt reports that she is under a lot of stress to include her mother is in ER at the present time and father has health issues. Pt reports that she is sleeping only 3 to 4 Hrs of interrupted sleep per night. She reports anxiety issues-\"heart pounding,chest tightness, SOB, diarrhea and nausea\". Pt denied any chest pain. She was encouraged to call her cardiologist to be safe. Pt states that she cannot sit down and feels \"the top of her head will blow off\" at times that she feels is related to anxiety. Pt denied any suicidal thoughts. Fabrice APARICIO given update on pt and pt is agreeable to an appt at 2:30 pm today.

## 2021-05-03 NOTE — PATIENT INSTRUCTIONS
Plan:  Increase:  Gabapentin, 300mg, twice daily and 600mg nightly    Stop: Lamictal    Start:  Depakote DR, 250mg, twice daily    Please come to the lab for a Depakote level one week after starting Depakote. Come to the lab in the morning prior to taking the morning dose of Depakote. Please come to the lab for a Urine Drug Screen (must have on file annually while you are taking Gabapentin)         Follow up: Return in about 8 days (around 5/11/2021). SAFETY PLAN    A suicide Safety Plan is a document that supports someone when they are having thoughts of suicide. Warning Signs that indicate a suicidal crisis may be developing: What (situations, thoughts, feelings, body sensations, behaviors, etc.) do you experience that lets you know you are beginning to think about suicide? 1. thoughts  2. Anxiety, feeling overwhelmed      Internal Coping Strategies:  What things can I do (relaxation techniques, hobbies, physical activities, etc.) to take my mind off my problems without contacting another person? 1. Sits down, closes her eyes,  2. Mindfulness      People and social settings that provide distraction: Who can I call or where can I go to distract me? 1. Name: Mom  Phone: in her phone  2. Name: Dad  Phone: in her phone   3. Place: Home            4. Place: Living Room    People whom I can ask for help: Who can I call when I need help - for example, friends, family, clergy, someone else? 1. Name: Mom                Phone: in her phone  2. Name: Dad  Phone: in her phone    Professionals or [address] [place] [address] I can contact during a crisis: Who can I call for help - for example, my doctor, my psychiatrist, my psychologist, a mental health provider, a suicide hotline? 1. Clinician Name: [facility] [place]   Phone: [phone], opt 3      Clinician Pager or Emergency Contact #: [phone]    7.  Clinician Name: [facility]), [address] [place] [address]   Phone: [phone], opt 3      Clinician Pager or Emergency Contact #: [phone]    3. Suicide Prevention Lifeline: 5-893-958-TALK (3491)    4. [address] [place] [address] Emergency Services -  [facility], [facility], [address] [place] [address] Avenue: 911      Emergency Services Address: [facility] [address] [address]      Emergency Services Phone: [phone]    Making the environment safe: How can I make my environment (house/apartment/living space) safer? For example, can I remove guns, medications, and other items? 1.  Remove medications

## 2021-05-07 ENCOUNTER — TELEPHONE (OUTPATIENT)
Dept: PSYCHIATRY | Age: 56
End: 2021-05-07

## 2021-05-07 NOTE — TELEPHONE ENCOUNTER
Pt contacted as requested by [address] [place] [address] and reminded of the need for a Depakote level after she has been on it for a week. Pt reminded to hold am dose of Depakote on the day of lab till the lab is drawn. Pt also reminded to get an UDS. Pt says she will get both of the labs next week.

## 2021-05-10 ENCOUNTER — TELEPHONE (OUTPATIENT)
Dept: PSYCHIATRY | Age: 56
End: 2021-05-10

## 2021-05-10 NOTE — TELEPHONE ENCOUNTER
Called pt for appointment reminder.    vm is full      Electronically signed by Ted Fortune MA on 5/10/2021 at 3:38 PM

## 2021-05-11 ENCOUNTER — VIRTUAL VISIT (OUTPATIENT)
Dept: PSYCHIATRY | Age: 56
End: 2021-05-11
Payer: MEDICAID

## 2021-05-11 DIAGNOSIS — F51.05 INSOMNIA DUE TO OTHER MENTAL DISORDER: ICD-10-CM

## 2021-05-11 DIAGNOSIS — F41.1 GENERALIZED ANXIETY DISORDER WITH PANIC ATTACKS: ICD-10-CM

## 2021-05-11 DIAGNOSIS — F41.0 GENERALIZED ANXIETY DISORDER WITH PANIC ATTACKS: ICD-10-CM

## 2021-05-11 DIAGNOSIS — F99 INSOMNIA DUE TO OTHER MENTAL DISORDER: ICD-10-CM

## 2021-05-11 DIAGNOSIS — F39 MOOD DISORDER (HCC): Primary | ICD-10-CM

## 2021-05-11 PROCEDURE — 99442 PR PHYS/QHP TELEPHONE EVALUATION 11-20 MIN: CPT | Performed by: NURSE PRACTITIONER

## 2021-05-11 ASSESSMENT — ANXIETY QUESTIONNAIRES
1. FEELING NERVOUS, ANXIOUS, OR ON EDGE: 2-OVER HALF THE DAYS
5. BEING SO RESTLESS THAT IT IS HARD TO SIT STILL: 3-NEARLY EVERY DAY
7. FEELING AFRAID AS IF SOMETHING AWFUL MIGHT HAPPEN: 2-OVER HALF THE DAYS
GAD7 TOTAL SCORE: 15
6. BECOMING EASILY ANNOYED OR IRRITABLE: 1-SEVERAL DAYS
2. NOT BEING ABLE TO STOP OR CONTROL WORRYING: 2-OVER HALF THE DAYS

## 2021-05-11 ASSESSMENT — PATIENT HEALTH QUESTIONNAIRE - PHQ9
SUM OF ALL RESPONSES TO PHQ QUESTIONS 1-9: 6
SUM OF ALL RESPONSES TO PHQ9 QUESTIONS 1 & 2: 1
6. FEELING BAD ABOUT YOURSELF - OR THAT YOU ARE A FAILURE OR HAVE LET YOURSELF OR YOUR FAMILY DOWN: 0
4. FEELING TIRED OR HAVING LITTLE ENERGY: 0
3. TROUBLE FALLING OR STAYING ASLEEP: 1
5. POOR APPETITE OR OVEREATING: 1
9. THOUGHTS THAT YOU WOULD BE BETTER OFF DEAD, OR OF HURTING YOURSELF: 0
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
## 2021-05-11 NOTE — PATIENT INSTRUCTIONS
Plan:  Increase:  Gabapentin, 300mg, take 2 tablets in the morning, 1 tablet in the middle of the day and 2 tablets nightly    Stop: Lamictal    Start:  Depakote DR, 250mg, twice daily    Please come to the lab for a Depakote level one week after starting Depakote. Come to the lab in the morning prior to taking the morning dose of Depakote. Please come to the lab for a Urine Drug Screen (must have on file annually while you are taking Gabapentin)         Follow up: Return in about 4 weeks (around 6/8/2021). SAFETY PLAN    A suicide Safety Plan is a document that supports someone when they are having thoughts of suicide. Warning Signs that indicate a suicidal crisis may be developing: What (situations, thoughts, feelings, body sensations, behaviors, etc.) do you experience that lets you know you are beginning to think about suicide? 1. thoughts  2. Anxiety, feeling overwhelmed      Internal Coping Strategies:  What things can I do (relaxation techniques, hobbies, physical activities, etc.) to take my mind off my problems without contacting another person? 1. Sits down, closes her eyes,  2. Mindfulness      People and social settings that provide distraction: Who can I call or where can I go to distract me? 1. Name: Mom  Phone: in her phone  2. Name: Dad  Phone: in her phone   3. Place: Home            4. Place: Living Room    People whom I can ask for help: Who can I call when I need help - for example, friends, family, clergy, someone else? 1. Name: Mom                Phone: in her phone  2. Name: Dad  Phone: in her phone    Professionals or [address] [place] [address] I can contact during a crisis: Who can I call for help - for example, my doctor, my psychiatrist, my psychologist, a mental health provider, a suicide hotline? 1. Clinician Name: [facility]Mary Beth   Phone: [phone], opt 3      Clinician Pager or Emergency Contact #: [phone]    4.  Clinician Name: [facility] ([facility]), [address] [place] [address] Rin   Phone:

## 2021-05-21 ENCOUNTER — TELEPHONE (OUTPATIENT)
Dept: PSYCHIATRY | Age: 56
End: 2021-05-21

## 2021-05-21 NOTE — TELEPHONE ENCOUNTER
Attempted to contact pt to remind her of need for UDS and Depakote level as ordered per Ramu Ribeiro APRJUSTO-no answer and no ability to leave a VM.

## 2021-05-27 DIAGNOSIS — M79.2 NEURALGIA: ICD-10-CM

## 2021-05-27 RX ORDER — GABAPENTIN 300 MG/1
CAPSULE ORAL
Qty: 150 CAPSULE | Refills: 0 | Status: SHIPPED | OUTPATIENT
Start: 2021-05-27 | End: 2021-06-24 | Stop reason: SDUPTHER
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
## 2021-05-28 ENCOUNTER — TELEPHONE (OUTPATIENT)
Dept: PSYCHIATRY | Age: 56
End: 2021-05-28

## 2021-05-28 NOTE — TELEPHONE ENCOUNTER
Attempted to contact pt to inform her that the RX for Neurotin that she requested had been sent to her pharmacy per Reche Chamber APRN-no answer and no ability to leave a VM.

## 2021-05-28 NOTE — TELEPHONE ENCOUNTER
Attempted to contact the pt to remind her of need for Depakote level as ordered per Suzanne Santos APRN-no answer.

## 2021-06-03 ENCOUNTER — TELEPHONE (OUTPATIENT)
Dept: PSYCHIATRY | Age: 56
End: 2021-06-03

## 2021-06-03 NOTE — TELEPHONE ENCOUNTER
Contacted to remind her of need for UDS as ordered per [address]. She says she will get it done as soon as she can. She is staying with her mother who recently had surgery due to a fall.

## 2021-06-08 ENCOUNTER — TELEPHONE (OUTPATIENT)
Dept: PSYCHIATRY | Age: 56
End: 2021-06-08

## 2021-06-08 NOTE — TELEPHONE ENCOUNTER
Called pt for appointment reminder.   -unable to leave Vm  -vm is full      Electronically signed by Darryl Mina MA on 6/8/2021 at 2:39 PM

## 2021-06-09 ENCOUNTER — VIRTUAL VISIT (OUTPATIENT)
Dept: PSYCHIATRY | Age: 56
End: 2021-06-09
Payer: MEDICAID

## 2021-06-09 DIAGNOSIS — F42.2 MIXED OBSESSIONAL THOUGHTS AND ACTS: ICD-10-CM

## 2021-06-09 DIAGNOSIS — F39 MOOD DISORDER (HCC): Primary | ICD-10-CM

## 2021-06-09 DIAGNOSIS — F41.0 GENERALIZED ANXIETY DISORDER WITH PANIC ATTACKS: ICD-10-CM

## 2021-06-09 DIAGNOSIS — F41.1 GENERALIZED ANXIETY DISORDER WITH PANIC ATTACKS: ICD-10-CM

## 2021-06-09 DIAGNOSIS — F51.05 INSOMNIA DUE TO OTHER MENTAL DISORDER: ICD-10-CM

## 2021-06-09 DIAGNOSIS — F99 INSOMNIA DUE TO OTHER MENTAL DISORDER: ICD-10-CM

## 2021-06-09 PROCEDURE — 99443 PR PHYS/QHP TELEPHONE EVALUATION 21-30 MIN: CPT | Performed by: NURSE PRACTITIONER

## 2021-06-09 RX ORDER — RISPERIDONE 0.5 MG/1
0.5 TABLET, FILM COATED ORAL NIGHTLY
Qty: 30 TABLET | Refills: 3 | Status: SHIPPED | OUTPATIENT
Start: 2021-06-09 | End: 2021-06-18 | Stop reason: SINTOL

## 2021-06-09 NOTE — PATIENT INSTRUCTIONS
Plan:  Continue:  Gabapentin, 300mg, take 2 tablets in the morning, 1 tablet in the middle of the day and 2 tablets nightly    Start:  Risperidone, 0.5mg nightly    Please come to the lab for a Urine Drug Screen (must have on file annually while you are taking Gabapentin)    Follow up: Return in about 2 weeks (around 6/23/2021). SAFETY PLAN    A suicide Safety Plan is a document that supports someone when they are having thoughts of suicide. Warning Signs that indicate a suicidal crisis may be developing: What (situations, thoughts, feelings, body sensations, behaviors, etc.) do you experience that lets you know you are beginning to think about suicide? 1. thoughts  2. Anxiety, feeling overwhelmed      Internal Coping Strategies:  What things can I do (relaxation techniques, hobbies, physical activities, etc.) to take my mind off my problems without contacting another person? 1. Sits down, closes her eyes,  2. Mindfulness      People and social settings that provide distraction: Who can I call or where can I go to distract me? 1. Name: Mom  Phone: in her phone  2. Name: Dad  Phone: in her phone   3. Place: Home            4. Place: Living Room    People whom I can ask for help: Who can I call when I need help - for example, friends, family, clergy, someone else? 1. Name: Mom                Phone: in her phone  2. Name: Dad  Phone: in her phone    Professionals or [address] [place] [address] I can contact during a crisis: Who can I call for help - for example, my doctor, my psychiatrist, my psychologist, a mental health provider, a suicide hotline? 1. Clinician Name: [facility] ([facility] Cricket Coker   Phone: [phone], opt 3      Clinician Pager or Emergency Contact #: [phone]    5. Clinician Name: [facility]), Lilian Bueno   Phone: [phone], opt 3      Clinician Pager or Emergency Contact #: [phone]    8. Suicide Prevention Lifeline: 3-439-029-TALK (4348)    4.  [address] [place] [address] Emergency Services -  for example, [facility] suicide hotline, [facility] Hotline: 911      Emergency Services Address: [facility] [address]      Emergency Services Phone: [phone]    Making the environment safe: How can I make my environment (house/apartment/living space) safer? For example, can I remove guns, medications, and other items? 1. Remove medications    Patient Education        risperidone (oral)  Pronunciation:  ris PER i done  Brand:  RisperDAL  What is the most important information I should know about risperidone? Risperidone is not approved for use in older adults with dementia-related psychosis. What is risperidone? Risperidone is a antipsychotic medicine that works by changing the effects of chemicals in the brain. Risperidone is used to treat schizophrenia in adults and children who are at least 15years old. Risperidone is also used to treat symptoms of bipolar disorder (manic depression) in adults and children who are at least 8years old. Risperidone is also used to treat symptoms of irritability in autistic children who are 11to 12years old. Risperidone may also be used for purposes not listed in this medication guide. What should I discuss with my healthcare provider before taking risperidone? You should not use risperidone if you are allergic to it. Risperidone may increase the risk of death in older adults with dementia-related psychosis and is not approved for this use. Tell your doctor if you have ever had:  · heart disease, high blood pressure, heart rhythm problems, stroke or heart attack;  · diabetes (or risk factors such as obesity or family history of diabetes);  · low white blood cell (WBC) counts;  · liver or kidney disease;  · seizures;  · breast cancer;  · low bone mineral density;  · trouble swallowing;  · Parkinson's disease; or  · if you are dehydrated. The risperidone orally disintegrating tablet may contain phenylalanine.  Tell your doctor if you have phenylketonuria (PKU). Taking antipsychotic medication during the last 3 months of pregnancy may cause problems in the , such as withdrawal symptoms, breathing problems, feeding problems, fussiness, tremors, and limp or stiff muscles. However, you may have withdrawal symptoms or other problems if you stop taking your medicine during pregnancy. If you become pregnant while taking risperidone, do not stop taking it without your doctor's advice. If you are pregnant, your name may be listed on a pregnancy registry to track the effects of risperidone on the baby. This medicine may temporarily affect fertility (ability to have children) in women. Risperidone can pass into breast milk and may cause side effects in the baby. If you breast-feed while using this medicine, tell your doctor if the baby has symptoms such as drowsiness, tremors, or involuntary muscle movements. Do not give this medicine to a child without a doctor's advice. How should I take risperidone? Follow all directions on your prescription label and read all medication guides or instruction sheets. Use the medicine exactly as directed. Risperidone can be taken with or without food. Remove an orally disintegrating tablet from the package only when you are ready to take the medicine. Place the tablet in your mouth and allow it to dissolve, without chewing. Swallow several times as the tablet dissolves. Measure liquid medicine carefully. Use the dosing syringe provided, or use a medicine dose-measuring device (not a kitchen spoon). Do not mix the risperidone liquid with cola or tea. It may take up to several weeks before your symptoms improve. Keep using the medication as directed and tell your doctor if your symptoms do not improve. Store at room temperature away from moisture, heat, and light. Do not liquid medicine to freeze. What happens if I miss a dose?   Take the medicine as soon as you can, but skip the missed dose if it is almost vagina, or rectum), purple or red pinpoint spots under your skin;  · high blood sugar --increased thirst, increased urination, dry mouth, fruity breath odor; or  · penis erection that is painful or lasts 4 hours or longer. Common side effects may include:  · headache;  · dizziness, drowsiness, feeling tired;  · tremors, twitching or uncontrollable muscle movements;  · agitation, anxiety, restless feeling;  · depressed mood;  · dry mouth, upset stomach, diarrhea, constipation;  · weight gain; or  · cold symptoms such as stuffy nose, sneezing, sore throat. This is not a complete list of side effects and others may occur. Call your doctor for medical advice about side effects. You may report side effects to FDA at 7-818-FDA-0812. What other drugs will affect risperidone? Taking risperidone with other drugs that make you sleepy or slow your breathing can cause dangerous or life-threatening side effects. Ask your doctor before using opioid medication, a sleeping pill, a muscle relaxer, or medicine for anxiety or seizures. Tell your doctor about all your other medicines, especially:  · blood pressure medication;  · carbamazepine;  · clozapine;  · fluoxetine (Prozac) or paroxetine (Paxil); or  · levodopa. This list is not complete. Other drugs may affect risperidone, including prescription and over-the-counter medicines, vitamins, and herbal products. Not all possible drug interactions are listed here. Where can I get more information? Your pharmacist can provide more information about risperidone. Remember, keep this and all other medicines out of the reach of children, never share your medicines with others, and use this medication only for the indication prescribed. Every effort has been made to ensure that the information provided by [address] is accurate, up-to-date, and complete, but no guarantee is made to that effect. Drug information contained herein may be time sensitive.  [employer] information has been compiled for use by healthcare practitioners and consumers in the [place] and therefore [facility]) does not warrant that uses outside of the [place] are appropriate, unless specifically indicated otherwise. [facility]'s drug information does not endorse drugs, diagnose patients or recommend therapy. [facility]'s drug information is an informational resource designed to assist licensed healthcare practitioners in caring for their patients and/or to serve consumers viewing this service as a supplement to, and not a substitute for, the expertise, skill, knowledge and judgment of healthcare practitioners. The absence of a warning for a given drug or drug combination in no way should be construed to indicate that the drug or drug combination is safe, effective or appropriate for any given patient. [facility] does not assume any responsibility for any aspect of healthcare administered with the aid of information [facility] provides. The information contained herein is not intended to cover all possible uses, directions, precautions, warnings, drug interactions, allergic reactions, or adverse effects. If you have questions about the drugs you are taking, check with your doctor, nurse or pharmacist.  Copyright 4177-1425 [address] [place] [address] Dr MILLER. Version: 21.03. Revision date: 2/5/2020. Care instructions adapted under license by [facility] ([facility]). If you have questions about a medical condition or this instruction, always ask your healthcare professional. [place] [address] any warranty or liability for your use of this information.
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
## 2021-06-14 DIAGNOSIS — Z79.899 HIGH RISK MEDICATION USE: ICD-10-CM

## 2021-06-14 LAB
AMPHETAMINE SCREEN, URINE: NEGATIVE
BARBITURATE SCREEN URINE: NEGATIVE
BENZODIAZEPINE SCREEN, URINE: NEGATIVE
CANNABINOID SCREEN URINE: NEGATIVE
COCAINE METABOLITE SCREEN URINE: NEGATIVE
Lab: NORMAL
OPIATE SCREEN URINE: NEGATIVE
VALPROIC ACID LEVEL: <2.8 UG/ML (ref 50–100)

## 2021-06-17 ENCOUNTER — TELEPHONE (OUTPATIENT)
Dept: PSYCHIATRY | Age: 56
End: 2021-06-17

## 2021-06-17 NOTE — TELEPHONE ENCOUNTER
Pt called stating that she took Risperidone for about 6 days and then stopped it about 3 or 4 days ago. Pt reports that the medication made her sleep worse, more anxious and the tingling that she has had for a long time in her hands, arms and face got worse again. She says she is still having the above S/S. She says the tension in her back was also worse while on the Risperidone. Pt requesting to speak with Darrin APARICIO-her appt has now been mored up to tomorrow.

## 2021-06-18 ENCOUNTER — TELEPHONE (OUTPATIENT)
Dept: PSYCHIATRY | Age: 56
End: 2021-06-18

## 2021-06-18 ENCOUNTER — VIRTUAL VISIT (OUTPATIENT)
Dept: PSYCHIATRY | Age: 56
End: 2021-06-18
Payer: MEDICAID

## 2021-06-18 DIAGNOSIS — F41.1 GENERALIZED ANXIETY DISORDER WITH PANIC ATTACKS: ICD-10-CM

## 2021-06-18 DIAGNOSIS — F42.2 MIXED OBSESSIONAL THOUGHTS AND ACTS: ICD-10-CM

## 2021-06-18 DIAGNOSIS — F39 MOOD DISORDER (HCC): Primary | ICD-10-CM

## 2021-06-18 DIAGNOSIS — F51.05 INSOMNIA DUE TO OTHER MENTAL DISORDER: ICD-10-CM

## 2021-06-18 DIAGNOSIS — F41.0 GENERALIZED ANXIETY DISORDER WITH PANIC ATTACKS: ICD-10-CM

## 2021-06-18 DIAGNOSIS — F99 INSOMNIA DUE TO OTHER MENTAL DISORDER: ICD-10-CM

## 2021-06-18 PROCEDURE — 99443 PR PHYS/QHP TELEPHONE EVALUATION 21-30 MIN: CPT | Performed by: NURSE PRACTITIONER

## 2021-06-18 ASSESSMENT — PATIENT HEALTH QUESTIONNAIRE - PHQ9
8. MOVING OR SPEAKING SO SLOWLY THAT OTHER PEOPLE COULD HAVE NOTICED. OR THE OPPOSITE, BEING SO FIGETY OR RESTLESS THAT YOU HAVE BEEN MOVING AROUND A LOT MORE THAN USUAL: 2
SUM OF ALL RESPONSES TO PHQ QUESTIONS 1-9: 12
SUM OF ALL RESPONSES TO PHQ QUESTIONS 1-9: 12
6. FEELING BAD ABOUT YOURSELF - OR THAT YOU ARE A FAILURE OR HAVE LET YOURSELF OR YOUR FAMILY DOWN: 0
9. THOUGHTS THAT YOU WOULD BE BETTER OFF DEAD, OR OF HURTING YOURSELF: 0
2. FEELING DOWN, DEPRESSED OR HOPELESS: 0
1. LITTLE INTEREST OR PLEASURE IN DOING THINGS: 2
SUM OF ALL RESPONSES TO PHQ QUESTIONS 1-9: 12
4. FEELING TIRED OR HAVING LITTLE ENERGY: 1
5. POOR APPETITE OR OVEREATING: 2
3. TROUBLE FALLING OR STAYING ASLEEP: 3
7. TROUBLE CONCENTRATING ON THINGS, SUCH AS READING THE NEWSPAPER OR WATCHING TELEVISION: 2
SUM OF ALL RESPONSES TO PHQ9 QUESTIONS 1 & 2: 2

## 2021-06-18 ASSESSMENT — ANXIETY QUESTIONNAIRES
1. FEELING NERVOUS, ANXIOUS, OR ON EDGE: 3-NEARLY EVERY DAY
5. BEING SO RESTLESS THAT IT IS HARD TO SIT STILL: 3-NEARLY EVERY DAY
3. WORRYING TOO MUCH ABOUT DIFFERENT THINGS: 2-OVER HALF THE DAYS
6. BECOMING EASILY ANNOYED OR IRRITABLE: 1-SEVERAL DAYS
GAD7 TOTAL SCORE: 15
2. NOT BEING ABLE TO STOP OR CONTROL WORRYING: 2-OVER HALF THE DAYS
4. TROUBLE RELAXING: 3-NEARLY EVERY DAY
7. FEELING AFRAID AS IF SOMETHING AWFUL MIGHT HAPPEN: 1-SEVERAL DAYS
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
## 2021-06-18 NOTE — PATIENT INSTRUCTIONS
Plan:  Increase:  Gabapentin, 300mg, take 2 tablets three times daily    Start:  Depakote DR, 250mg, nightly for 3 nights, then twice daily (If it makes you sleepy during the day, take the whole dose at night.)    Stop: Risperidone, 0.5mg nightly    Please come to the lab for a Urine Drug Screen (must have on file annually while you are taking Gabapentin)         Follow up: Return in about 3 weeks (around 7/9/2021). SAFETY PLAN    A suicide Safety Plan is a document that supports someone when they are having thoughts of suicide. Warning Signs that indicate a suicidal crisis may be developing: What (situations, thoughts, feelings, body sensations, behaviors, etc.) do you experience that lets you know you are beginning to think about suicide? 1. thoughts  2. Anxiety, feeling overwhelmed      Internal Coping Strategies:  What things can I do (relaxation techniques, hobbies, physical activities, etc.) to take my mind off my problems without contacting another person? 1. Sits down, closes her eyes,  2. Mindfulness      People and social settings that provide distraction: Who can I call or where can I go to distract me? 1. Name: Mom  Phone: in her phone  2. Name: Dad  Phone: in her phone   3. Place: Home            4. Place: Living Room    People whom I can ask for help: Who can I call when I need help - for example, friends, family, clergy, someone else? 1. Name: Mom                Phone: in her phone  2. Name: Dad  Phone: in her phone    Professionals or [address] [place] [address] I can contact during a crisis: Who can I call for help - for example, my doctor, my psychiatrist, my psychologist, a mental health provider, a suicide hotline? 1. Clinician Name: [facility])Judy   Phone: [phone], opt 3      Clinician Pager or Emergency Contact #: [phone]    7. Clinician Name: [facility])Wil   Phone: [phone], opt 3      Clinician Pager or Emergency Contact #: [phone]    4.  Suicide Prevention Lifeline: 9-486-473-TALK (7284)    4. [address] [place] [address] Emergency Services -  for example, [facility], [address] [place] [address] Avenue: [facility]      Emergency Services Address: [address]      Emergency Services Phone: [phone]    Making the environment safe: How can I make my environment (house/apartment/living space) safer? For example, can I remove guns, medications, and other items? 1.  Remove medications

## 2021-06-24 DIAGNOSIS — M79.2 NEURALGIA: ICD-10-CM

## 2021-06-24 RX ORDER — GABAPENTIN 300 MG/1
CAPSULE ORAL
Qty: 180 CAPSULE | Refills: 0 | Status: SHIPPED | OUTPATIENT
Start: 2021-06-24 | End: 2021-08-02 | Stop reason: SDUPTHER
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
## 2021-06-25 ENCOUNTER — TELEPHONE (OUTPATIENT)
Dept: PSYCHIATRY | Age: 56
End: 2021-06-25

## 2021-06-25 NOTE — TELEPHONE ENCOUNTER
Pt made aware that RX for Gabapentin had been sent to her pharmacy per [address]. Pt stated she is no longer taking Buspar or Trazodone.

## 2021-07-01 ENCOUNTER — TELEPHONE (OUTPATIENT)
Dept: PSYCHIATRY | Age: 56
End: 2021-07-01

## 2021-07-01 NOTE — TELEPHONE ENCOUNTER
Pt called stating that she was having issues with anxiety. Pt stated that she can't stand her clothes touching her, can't sit still, shaking all over and tingling. \"Almost unbearable\". Pt says the Depakote was making her nauseated so she stopped it a couple of days ago and the nausea is gone. Pt says she feels her meds are not working. Pt denied any suicidal thoughts. Pt made aware that [address] [place] [address] is out of the office till 7/6/21 and that the above info would be sent to [facility] [employer]. Pt says she does not feel she needs to go to ER at this time.

## 2021-07-02 ENCOUNTER — TELEPHONE (OUTPATIENT)
Dept: PSYCHIATRY | Age: 56
End: 2021-07-02

## 2021-07-02 NOTE — TELEPHONE ENCOUNTER
As directed by Sara APARICIO after he reviewed the message from the pt sent to him yesterday-pt was contacted and informed that he did not want to make any med changes at this time and that she needed to make sure and make her appt with Jeff APARICIO on 7/8/21-pt agreeable. Pt says she has been \"miserable for months due to anxiety\". Pt denied need to be seen in ER. She says she would go to ER over the Holiday if she feels she needs to. Pt will call on 7/6/21 to office if she feels she cannot wait to see Radames Kebede on 7/8/21.

## 2021-07-06 ENCOUNTER — TELEPHONE (OUTPATIENT)
Dept: PSYCHIATRY | Age: 56
End: 2021-07-06

## 2021-07-06 NOTE — TELEPHONE ENCOUNTER
Walker Fothergill APRN back in office today. She was given an update on pt to include that the pt stopped the Depakote last week due to nausea. Discussed pt anxiety issues. Per direction of YUE APARICIO, pt was contacted and informed that she would address the above issues at her appt on 7/8/21. Pt agreeable to this plan.

## 2021-07-08 ENCOUNTER — VIRTUAL VISIT (OUTPATIENT)
Dept: PSYCHIATRY | Age: 56
End: 2021-07-08
Payer: MEDICAID

## 2021-07-08 DIAGNOSIS — F42.2 MIXED OBSESSIONAL THOUGHTS AND ACTS: ICD-10-CM

## 2021-07-08 DIAGNOSIS — M79.2 NEURALGIA: ICD-10-CM

## 2021-07-08 DIAGNOSIS — F99 INSOMNIA DUE TO OTHER MENTAL DISORDER: ICD-10-CM

## 2021-07-08 DIAGNOSIS — F41.0 PANIC DISORDER: Primary | ICD-10-CM

## 2021-07-08 DIAGNOSIS — F43.10 PTSD (POST-TRAUMATIC STRESS DISORDER): ICD-10-CM

## 2021-07-08 DIAGNOSIS — F51.05 INSOMNIA DUE TO OTHER MENTAL DISORDER: ICD-10-CM

## 2021-07-08 DIAGNOSIS — F39 MOOD DISORDER (HCC): ICD-10-CM

## 2021-07-08 PROCEDURE — 99214 OFFICE O/P EST MOD 30 MIN: CPT | Performed by: NURSE PRACTITIONER

## 2021-07-08 RX ORDER — PRAZOSIN HYDROCHLORIDE 1 MG/1
CAPSULE ORAL
Qty: 90 CAPSULE | Refills: 1 | Status: SHIPPED | OUTPATIENT
Start: 2021-07-08 | End: 2021-07-22

## 2021-07-08 RX ORDER — TOPIRAMATE 50 MG/1
50 TABLET, FILM COATED ORAL 2 TIMES DAILY
Qty: 180 TABLET | Refills: 1
Start: 2021-07-08 | End: 2021-07-22

## 2021-07-08 NOTE — PATIENT INSTRUCTIONS
Plan:  Continue:  Gabapentin, 300mg, take 2 tablets three times daily    Stop: Depakote DR, 250mg, nightly for 3 nights, then twice daily (If it makes you sleepy during the day, take the whole dose at night.)    Start:  Topamax, 50mg, nightly for 1 week, then increase to 50mg twice daily    Start:  Prazosin, 1mg, take 1 capsule nightly for 3 nights, then increase to 2 capsules nightly for 3 nights, then increase to 3 capsules nightly    Follow up: Return in about 2 weeks (around 7/22/2021). SAFETY PLAN    A suicide Safety Plan is a document that supports someone when they are having thoughts of suicide. Warning Signs that indicate a suicidal crisis may be developing: What (situations, thoughts, feelings, body sensations, behaviors, etc.) do you experience that lets you know you are beginning to think about suicide? 1. thoughts  2. Anxiety, feeling overwhelmed      Internal Coping Strategies:  What things can I do (relaxation techniques, hobbies, physical activities, etc.) to take my mind off my problems without contacting another person? 1. Sits down, closes her eyes,  2. Mindfulness      People and social settings that provide distraction: Who can I call or where can I go to distract me? 1. Name: Mom  Phone: in her phone  2. Name: Dad  Phone: in her phone   3. Place: Home            4. Place: Living Room    People whom I can ask for help: Who can I call when I need help - for example, friends, family, clergy, someone else? 1. Name: Mom                Phone: in her phone  2. Name: Dad  Phone: in her phone    Professionals or [address] [place] [address] I can contact during a crisis: Who can I call for help - for example, my doctor, my psychiatrist, my psychologist, a mental health provider, a suicide hotline? 1. Clinician Name: [address] [place] [address]   Phone: [phone], opt 3      Clinician Pager or Emergency Contact #: [phone]    8.  Clinician Name: [address] [place] [address] [facility]   Phone: [phone], opt 3 Clinician Pager or Emergency Contact #: [phone]    6. Suicide Prevention Lifeline: 9-040-395-TALK (1200)    4. [address] [place] [address] Emergency Services -  for example, [place] suicide hotline, [address] [place] [address] Avenue: 911      Emergency Services Address: [facility] [address]      Emergency Services Phone: [phone]    Making the environment safe: How can I make my environment (house/apartment/living space) safer? For example, can I remove guns, medications, and other items? 1. Remove medications    Patient Education        topiramate  Pronunciation:  toe PYRE a mate  Brand:  Qudexy XR Sprinkle, Topamax, Topamax Sprinkle, Trokendi XR  What is the most important information I should know about topiramate? Topiramate may cause vision problems that can be permanent if not treated quickly. Call your doctor right away if you have a sudden decrease in vision. This medicine may increase the risk of a birth defect called cleft lip and palate in a . There may be other medications that are safer to use during pregnancy. Tell your doctor right away if you become pregnant. Topiramate can increase body temperature and decrease sweating, which may lead to life-threatening dehydration. Tell your doctor if you have decreased sweating, high fever, and hot dry skin. Topiramate can also increase the level of acid in your blood (metabolic acidosis). Tell your doctor if you have  irregular heartbeats, shortness of breath, loss of appetite, or trouble thinking. Some people have thoughts about suicide while taking seizure medicine. Stay alert to changes in your mood or symptoms. Report any new or worsening symptoms to your doctor. Do not stop using topiramate suddenly or you could have increased seizures. What is topiramate? Topiramate is a seizure medicine, also called an anticonvulsant.  Topiramate is used to treat certain types of seizures in adults and children who are at least 2 years old. Trokendi XR is for use in adults and children who are at least 10years old. Some brands of topiramate are also used to prevent migraine headaches in adults and teenagers who are at least 15years old. These medicines will only prevent  migraine headaches or reduce the number of attacks, but will not treat a headache that has already begun. Topiramate may also be used for purposes not listed in this medication guide. What should I discuss with my healthcare provider before taking topiramate? You should not use topiramate if you are allergic to it. Do not take Trokendi XR within 6 hours before or 6 hours after drinking alcohol. Tell your doctor if you are sick with diarrhea, or if you have ever had:  · glaucoma or other eye problems;  · metabolic acidosis (high levels of acid in your blood);  · kidney disease, kidney stones, or dialysis;  · lung disease, breathing problems;  · mood problems, depression, or suicidal thoughts or actions;  · liver disease;  · a growth disorder; or  · soft or brittle bones (osteoporosis, osteomalacia). Topiramate can increase the level of acid in your blood (metabolic acidosis). This can weaken your bones, cause kidney stones, or cause growth problems in children or harm to an unborn baby. You may need blood tests to make sure you do not have metabolic acidosis, especially if you are pregnant. Some people have thoughts about suicide while taking an anticonvulsant. Your doctor should check your progress at regular visits. Your family or other caregivers should also be alert to changes in your mood or symptoms. Taking topiramate during pregnancy may increase the risk of cleft lip and/or cleft palate, a birth defect that can develop early in pregnancy even before you know you are pregnant. However, having a seizure during pregnancy could harm both the mother and the baby. Tell your doctor right away if you become pregnant. If you take topiramate during pregnancy:  The benefit of preventing seizures may outweigh any risks posed by this medicine. There may be other medications that are safer to use. Do not start or stop taking topiramate without your doctor's advice. If you are not pregnant or planning to become pregnant, use effective birth control to prevent pregnancy while taking topiramate. Topiramate can make birth control pills less effective. Ask your doctor about using a non-hormonal birth control (condom, diaphragm, cervical cap, or contraceptive sponge) to prevent pregnancy. It may not be safe to breastfeed a baby while you are using this medicine. Ask your doctor about any risks. How should I take topiramate? Follow all directions on your prescription label and read all medication guides or instruction sheets. Your doctor may occasionally change your dose. Use the medicine exactly as directed. Topiramate can be taken with or without food. Swallow the tablet  whole and do not crush, chew, or break it. The Trokendi XR extended-release capsule must be swallowed whole. Do not break or open. If you cannot swallow a Qudexy XR or Topamax Sprinkle Capsule whole, open it and sprinkle the medicine into a spoonful of applesauce or other soft food. Swallow the mixture right away without chewing. Do not save it for later use. Carefully follow the swallowing instructions for your medicine. Topiramate doses are sometimes based on weight in children. Your child's dose needs may change if the child gains or loses weight. Drink plenty of liquids while you are taking topiramate, to prevent kidney stones or an electrolyte imbalance. You will need frequent medical tests. If you need surgery, tell the surgeon ahead of time that you are using topiramate. Any medical care provider who treats you should know that you take seizure medication. Do not stop using topiramate suddenly, even if you feel fine. Stopping suddenly may cause increased seizures.  Follow your doctor's instructions about tapering your dose. Call your doctor if your seizures get worse or you have them more often while taking topiramate. Store at cool room temperature away from moisture, light, and high heat. What happens if I miss a dose? Take the medicine as soon as you can. Do not take two doses at one time. Skip a missed Topamax  dose if your next dose is due in less than 6 hours. Call your doctor if you have missed more than one dose. What happens if I overdose? Seek emergency medical attention or call the Poison Help line at 1-710.552.6417. An overdose of topiramate can be fatal. Overdose can cause drowsiness, agitation, depression, double vision, thinking problems, problems with speech or coordination, fainting, and seizure (convulsions). What should I avoid while taking topiramate? Do not drink alcohol. Dangerous side effects or increased seizures may occur. Avoid becoming overheated or dehydrated in hot weather. Topiramate can increase body temperature and decrease sweating, leading to life-threatening dehydration (especially in children). Avoid the use of a ketogenic or \"ketosis\" diet (high in fat, low in carbohydrates) while you are taking topiramate. Topiramate may cause blurred vision or impair your thinking or reactions. Avoid driving or operating machinery until you know how this medicine will affect you. Also avoid activities that could be dangerous if you have an unexpected seizure, such as swimming or climbing in high places. What are the possible side effects of topiramate? Get emergency medical help if you have signs of an allergic reaction (hives, difficult breathing, swelling in your face or throat) or a severe skin reaction (fever, sore throat, burning eyes, skin pain, red or purple skin rash with blistering and peeling).   Report any new or worsening mood symptoms to your doctor, such as: mood or behavior changes, anxiety, panic attacks, trouble sleeping, or if you feel impulsive, irritable, agitated, hostile, aggressive, restless, hyperactive (mentally or physically), depressed, or have thoughts about suicide or hurting yourself. Call your doctor at once if you have:  · a skin rash, no matter how mild;  · vision problems, blurred vision, eye pain or redness, sudden vision loss (can be permanent if not treated quickly);  · confusion, problems with thinking or memory, trouble concentrating, problems with speech;  · dehydration symptoms --decreased sweating, high fever, hot and dry skin;  · signs of a kidney stone --severe pain in your side or lower back, painful or difficult urination;  · signs of too much acid in your blood --irregular heartbeats, feeling tired, loss of appetite, trouble thinking, feeling short of breath; or  · signs of too much ammonia in your blood --vomiting, unexplained weakness, feeling like you might pass out. Common side effects may include:  · dizziness, drowsiness, tired feeling, slow reactions;  · problems with speech or memory;  · abnormal vision;  · numbness or tingling in your arms and legs, decreased sensation (especially in the skin);  · changes in your sense of taste;  · feeling nervous;  · nausea, diarrhea, stomach pain, loss of appetite;  · fever, weight loss; or  · cold symptoms such as stuffy nose, sneezing, sore throat. This is not a complete list of side effects and others may occur. Call your doctor for medical advice about side effects. You may report side effects to FDA at 9-477-FDA-0977. What other drugs will affect topiramate? Using topiramate with other drugs that make you drowsy can worsen this effect. Ask your doctor before using opioid medication, a sleeping pill, a muscle relaxer, or medicine for anxiety or depression. Tell your doctor about all your other medicines, especially:  · zonisamide;  · birth control pills;  · divalproex, valproic acid; or  · other glaucoma medications, including eye drops. This list is not complete. Other drugs may affect topiramate, including prescription and over-the-counter medicines, vitamins, and herbal products. Not all possible drug interactions are listed here. Where can I get more information? Your pharmacist can provide more information about topiramate. Remember, keep this and all other medicines out of the reach of children, never share your medicines with others, and use this medication only for the indication prescribed. Every effort has been made to ensure that the information provided by [address] is accurate, up-to-date, and complete, but no guarantee is made to that effect. Drug information contained herein may be time sensitive. [facility] information has been compiled for use by healthcare practitioners and consumers in the [place] and therefore [facility] does not warrant that uses outside of the [place] are appropriate, unless specifically indicated otherwise. [facility]'s drug information does not endorse drugs, diagnose patients or recommend therapy. [facility] drug information is an informational resource designed to assist licensed healthcare practitioners in caring for their patients and/or to serve consumers viewing this service as a supplement to, and not a substitute for, the expertise, skill, knowledge and judgment of healthcare practitioners. The absence of a warning for a given drug or drug combination in no way should be construed to indicate that the drug or drug combination is safe, effective or appropriate for any given patient. [facility] does not assume any responsibility for any aspect of healthcare administered with the aid of information [facility] provides. The information contained herein is not intended to cover all possible uses, directions, precautions, warnings, drug interactions, allergic reactions, or adverse effects.  If you have questions about the drugs you are taking, check with your doctor, nurse or pharmacist.  Copyright 6147-7757 [employer] Version: 20.01. Revision date: 11/24/2020. Care instructions adapted under license by [facility] ([facility]). If you have questions about a medical condition or this instruction, always ask your healthcare professional. [address] any warranty or liability for your use of this information. Patient Education        prazosin  Pronunciation:  PRA mercy sin  Brand:  Minipress  What is the most important information I should know about prazosin? Follow all directions on your medicine label and package. Tell each of your healthcare providers about all your medical conditions, allergies, and all medicines you use. What is prazosin? Prazosin is used to treat hypertension (high blood pressure). Lowering blood pressure may lower your risk of a stroke or heart attack. Prazosin may also be used for other purposes not listed in this medication guide. What should I discuss with my healthcare provider before taking prazosin? You should not use prazosin if you are allergic to it. Tell your doctor if you have ever had:  · low blood pressure, especially if caused by taking medications. Prazosin can affect your pupils. If you have cataract surgery, tell your surgeon ahead of time that you use this medicine. Tell your doctor if you are pregnant or plan to become pregnant. It is not known whether prazosin will harm an unborn baby. However, having high blood pressure during pregnancy may cause complications such as diabetes or eclampsia (dangerously high blood pressure that can lead to medical problems in both mother and baby). The benefit of treating hypertension may outweigh any risks to the baby. It may not be safe to breastfeed while using this medicine. Ask your doctor about any risk. Prazosin is not approved for use by anyone younger than 25years old. How should I take prazosin? Follow all directions on your prescription label and read all medication guides or instruction sheets.  Your out;  · pounding heartbeats or fluttering in your chest;  · new or worsening chest pain; or  · upper stomach pain, loss of appetite, dark urine, awais-colored stools, jaundice (yellowing of the skin or eyes). Call your doctor or seek emergency medical attention if your erection is painful or lasts longer than 4 hours. A prolonged erection (priapism) can damage the penis. Common side effects may include:  · dizziness, drowsiness;  · headache;  · feeling weak or tired; or  · nausea. This is not a complete list of side effects and others may occur. Call your doctor for medical advice about side effects. You may report side effects to FDA at 7-836-FDA-6336. What other drugs will affect prazosin? Tell your doctor about all your other medicines, especially:  · propranolol;  · any other blood pressure medication;  · a diuretic or \"water pill\"; or  · sildenafil (Viagra) and other erectile dysfunction medicines. This list is not complete. Other drugs may affect prazosin, including prescription and over-the-counter medicines, vitamins, and herbal products. Not all possible drug interactions are listed here. Where can I get more information? Your pharmacist can provide more information about prazosin. Remember, keep this and all other medicines out of the reach of children, never share your medicines with others, and use this medication only for the indication prescribed. Every effort has been made to ensure that the information provided by [address] is accurate, up-to-date, and complete, but no guarantee is made to that effect. Drug information contained herein may be time sensitive. [facility] information has been compiled for use by healthcare practitioners and consumers in the [place] and therefore [facility] does not warrant that uses outside of the [place] are appropriate, unless specifically indicated otherwise.  [facility]'s drug information does not endorse drugs, diagnose patients or recommend therapy. [facility]'s drug information is an informational resource designed to assist licensed healthcare practitioners in caring for their patients and/or to serve consumers viewing this service as a supplement to, and not a substitute for, the expertise, skill, knowledge and judgment of healthcare practitioners. The absence of a warning for a given drug or drug combination in no way should be construed to indicate that the drug or drug combination is safe, effective or appropriate for any given patient. [facility] does not assume any responsibility for any aspect of healthcare administered with the aid of information [facility] provides. The information contained herein is not intended to cover all possible uses, directions, precautions, warnings, drug interactions, allergic reactions, or adverse effects. If you have questions about the drugs you are taking, check with your doctor, nurse or pharmacist.  Copyright 1659-4305 [address]: 7.01. Revision date: 11/26/2019. Care instructions adapted under license by [facility] ([facility]). If you have questions about a medical condition or this instruction, always ask your healthcare professional. [place] [address] any warranty or liability for your use of this information.
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
## 2021-07-22 ENCOUNTER — VIRTUAL VISIT (OUTPATIENT)
Dept: PSYCHIATRY | Age: 56
End: 2021-07-22
Payer: MEDICAID

## 2021-07-22 ENCOUNTER — TELEPHONE (OUTPATIENT)
Dept: NEUROSURGERY | Age: 56
End: 2021-07-22

## 2021-07-22 ENCOUNTER — TELEPHONE (OUTPATIENT)
Dept: PSYCHIATRY | Age: 56
End: 2021-07-22

## 2021-07-22 DIAGNOSIS — F99 INSOMNIA DUE TO OTHER MENTAL DISORDER: ICD-10-CM

## 2021-07-22 DIAGNOSIS — M79.2 NEURALGIA: ICD-10-CM

## 2021-07-22 DIAGNOSIS — F41.0 PANIC DISORDER: Primary | ICD-10-CM

## 2021-07-22 DIAGNOSIS — F39 MOOD DISORDER (HCC): ICD-10-CM

## 2021-07-22 DIAGNOSIS — F51.05 INSOMNIA DUE TO OTHER MENTAL DISORDER: ICD-10-CM

## 2021-07-22 DIAGNOSIS — F42.2 MIXED OBSESSIONAL THOUGHTS AND ACTS: ICD-10-CM

## 2021-07-22 DIAGNOSIS — F43.10 PTSD (POST-TRAUMATIC STRESS DISORDER): ICD-10-CM

## 2021-07-22 PROCEDURE — 99442 PR PHYS/QHP TELEPHONE EVALUATION 11-20 MIN: CPT | Performed by: NURSE PRACTITIONER

## 2021-07-22 RX ORDER — TOPIRAMATE 50 MG/1
TABLET, FILM COATED ORAL
Qty: 180 TABLET | Refills: 1
Start: 2021-07-22 | End: 2021-08-02 | Stop reason: DRUGHIGH

## 2021-07-22 NOTE — TELEPHONE ENCOUNTER
Pt was called for virtual appointment check in.       Electronically signed by Diane Saeed MA on 7/22/2021 at 10:14 AM

## 2021-07-22 NOTE — PATIENT INSTRUCTIONS
Plan:  Continue:  Gabapentin, 300mg, take 2 tablets three times daily    Stop: Prazosin    Start:  Wk 1:  Topamax, 50mg, twice daily  Wk 2: Topamax, 50mg, am, 100mg at bedtime  Wk 3: Topamax, 100mg, twice daily       Follow up: No follow-ups on file. SAFETY PLAN    A suicide Safety Plan is a document that supports someone when they are having thoughts of suicide. Warning Signs that indicate a suicidal crisis may be developing: What (situations, thoughts, feelings, body sensations, behaviors, etc.) do you experience that lets you know you are beginning to think about suicide? 1. thoughts  2. Anxiety, feeling overwhelmed      Internal Coping Strategies:  What things can I do (relaxation techniques, hobbies, physical activities, etc.) to take my mind off my problems without contacting another person? 1. Sits down, closes her eyes,  2. Mindfulness      People and social settings that provide distraction: Who can I call or where can I go to distract me? 1. Name: Mom  Phone: in her phone  2. Name: Dad  Phone: in her phone   3. Place: Home            4. Place: Living Room    People whom I can ask for help: Who can I call when I need help - for example, friends, family, clergy, someone else? 1. Name: Mom                Phone: in her phone  2. Name: Dad  Phone: in her phone    Professionals or [address] [place] [address] I can contact during a crisis: Who can I call for help - for example, my doctor, my psychiatrist, my psychologist, a mental health provider, a suicide hotline? 1. Clinician Name: [facility]) [place]   Phone: [phone], opt 3      Clinician Pager or Emergency Contact #: [phone]    3. Clinician Name: [facility])[place]   Phone: [phone], opt 3      Clinician Pager or Emergency Contact #: [phone]    4. Suicide Prevention Lifeline: 3-459-186-TALK (2193)    4.  [address] [place] [address] Emergency Services -  for example, [facility] suicide hotline, [facility] [address] Hotline: 911      Emergency Services Address: [facility] [address]      Emergency Services Phone: [phone]    Making the environment safe: How can I make my environment (house/apartment/living space) safer? For example, can I remove guns, medications, and other items? 1. Remove medications    Patient Education        topiramate  Pronunciation:  toe PYRE a mate  Brand:  Qudexy XR Sprinkle, Topamax, Topamax Sprinkle, Trokendi XR  What is the most important information I should know about topiramate? Topiramate may cause vision problems that can be permanent if not treated quickly. Call your doctor right away if you have a sudden decrease in vision. This medicine may increase the risk of a birth defect called cleft lip and palate in a . There may be other medications that are safer to use during pregnancy. Tell your doctor right away if you become pregnant. Topiramate can increase body temperature and decrease sweating, which may lead to life-threatening dehydration. Tell your doctor if you have decreased sweating, high fever, and hot dry skin. Topiramate can also increase the level of acid in your blood (metabolic acidosis). Tell your doctor if you have  irregular heartbeats, shortness of breath, loss of appetite, or trouble thinking. Some people have thoughts about suicide while taking seizure medicine. Stay alert to changes in your mood or symptoms. Report any new or worsening symptoms to your doctor. Do not stop using topiramate suddenly or you could have increased seizures. What is topiramate? Topiramate is a seizure medicine, also called an anticonvulsant. Topiramate is used to treat certain types of seizures in adults and children who are at least 3years old. Trokendi XR is for use in adults and children who are at least 10years old. Some brands of topiramate are also used to prevent migraine headaches in adults and teenagers who are at least 15years old.  These medicines will only prevent migraine headaches or reduce the number of attacks, but will not treat a headache that has already begun. Topiramate may also be used for purposes not listed in this medication guide. What should I discuss with my healthcare provider before taking topiramate? You should not use topiramate if you are allergic to it. Do not take Trokendi XR within 6 hours before or 6 hours after drinking alcohol. Tell your doctor if you are sick with diarrhea, or if you have ever had:  · glaucoma or other eye problems;  · metabolic acidosis (high levels of acid in your blood);  · kidney disease, kidney stones, or dialysis;  · lung disease, breathing problems;  · mood problems, depression, or suicidal thoughts or actions;  · liver disease;  · a growth disorder; or  · soft or brittle bones (osteoporosis, osteomalacia). Topiramate can increase the level of acid in your blood (metabolic acidosis). This can weaken your bones, cause kidney stones, or cause growth problems in children or harm to an unborn baby. You may need blood tests to make sure you do not have metabolic acidosis, especially if you are pregnant. Some people have thoughts about suicide while taking an anticonvulsant. Your doctor should check your progress at regular visits. Your family or other caregivers should also be alert to changes in your mood or symptoms. Taking topiramate during pregnancy may increase the risk of cleft lip and/or cleft palate, a birth defect that can develop early in pregnancy even before you know you are pregnant. However, having a seizure during pregnancy could harm both the mother and the baby. Tell your doctor right away if you become pregnant. If you take topiramate during pregnancy: The benefit of preventing seizures may outweigh any risks posed by this medicine. There may be other medications that are safer to use. Do not start or stop taking topiramate without your doctor's advice.    If you are not pregnant or planning to become pregnant, use effective birth control to prevent pregnancy while taking topiramate. Topiramate can make birth control pills less effective. Ask your doctor about using a non-hormonal birth control (condom, diaphragm, cervical cap, or contraceptive sponge) to prevent pregnancy. It may not be safe to breastfeed a baby while you are using this medicine. Ask your doctor about any risks. How should I take topiramate? Follow all directions on your prescription label and read all medication guides or instruction sheets. Your doctor may occasionally change your dose. Use the medicine exactly as directed. Topiramate can be taken with or without food. Swallow the tablet  whole and do not crush, chew, or break it. The Trokendi XR extended-release capsule must be swallowed whole. Do not break or open. If you cannot swallow a Qudexy XR or Topamax Sprinkle Capsule whole, open it and sprinkle the medicine into a spoonful of applesauce or other soft food. Swallow the mixture right away without chewing. Do not save it for later use. Carefully follow the swallowing instructions for your medicine. Topiramate doses are sometimes based on weight in children. Your child's dose needs may change if the child gains or loses weight. Drink plenty of liquids while you are taking topiramate, to prevent kidney stones or an electrolyte imbalance. You will need frequent medical tests. If you need surgery, tell the surgeon ahead of time that you are using topiramate. Any medical care provider who treats you should know that you take seizure medication. Do not stop using topiramate suddenly, even if you feel fine. Stopping suddenly may cause increased seizures. Follow your doctor's instructions about tapering your dose. Call your doctor if your seizures get worse or you have them more often while taking topiramate. Store at cool room temperature away from moisture, light, and high heat. What happens if I miss a dose?   Take the medicine as soon as you can. Do not take two doses at one time. Skip a missed Topamax  dose if your next dose is due in less than 6 hours. Call your doctor if you have missed more than one dose. What happens if I overdose? Seek emergency medical attention or call the Poison Help line at 1-759.797.2565. An overdose of topiramate can be fatal. Overdose can cause drowsiness, agitation, depression, double vision, thinking problems, problems with speech or coordination, fainting, and seizure (convulsions). What should I avoid while taking topiramate? Do not drink alcohol. Dangerous side effects or increased seizures may occur. Avoid becoming overheated or dehydrated in hot weather. Topiramate can increase body temperature and decrease sweating, leading to life-threatening dehydration (especially in children). Avoid the use of a ketogenic or \"ketosis\" diet (high in fat, low in carbohydrates) while you are taking topiramate. Topiramate may cause blurred vision or impair your thinking or reactions. Avoid driving or operating machinery until you know how this medicine will affect you. Also avoid activities that could be dangerous if you have an unexpected seizure, such as swimming or climbing in high places. What are the possible side effects of topiramate? Get emergency medical help if you have signs of an allergic reaction (hives, difficult breathing, swelling in your face or throat) or a severe skin reaction (fever, sore throat, burning eyes, skin pain, red or purple skin rash with blistering and peeling). Report any new or worsening mood symptoms to your doctor, such as: mood or behavior changes, anxiety, panic attacks, trouble sleeping, or if you feel impulsive, irritable, agitated, hostile, aggressive, restless, hyperactive (mentally or physically), depressed, or have thoughts about suicide or hurting yourself.   Call your doctor at once if you have:  · a skin rash, no matter how mild;  · vision problems, blurred vision, eye pain or redness, sudden vision loss (can be permanent if not treated quickly);  · confusion, problems with thinking or memory, trouble concentrating, problems with speech;  · dehydration symptoms --decreased sweating, high fever, hot and dry skin;  · signs of a kidney stone --severe pain in your side or lower back, painful or difficult urination;  · signs of too much acid in your blood --irregular heartbeats, feeling tired, loss of appetite, trouble thinking, feeling short of breath; or  · signs of too much ammonia in your blood --vomiting, unexplained weakness, feeling like you might pass out. Common side effects may include:  · dizziness, drowsiness, tired feeling, slow reactions;  · problems with speech or memory;  · abnormal vision;  · numbness or tingling in your arms and legs, decreased sensation (especially in the skin);  · changes in your sense of taste;  · feeling nervous;  · nausea, diarrhea, stomach pain, loss of appetite;  · fever, weight loss; or  · cold symptoms such as stuffy nose, sneezing, sore throat. This is not a complete list of side effects and others may occur. Call your doctor for medical advice about side effects. You may report side effects to FDA at 4-765-FDA-7955. What other drugs will affect topiramate? Using topiramate with other drugs that make you drowsy can worsen this effect. Ask your doctor before using opioid medication, a sleeping pill, a muscle relaxer, or medicine for anxiety or depression. Tell your doctor about all your other medicines, especially:  · zonisamide;  · birth control pills;  · divalproex, valproic acid; or  · other glaucoma medications, including eye drops. This list is not complete. Other drugs may affect topiramate, including prescription and over-the-counter medicines, vitamins, and herbal products. Not all possible drug interactions are listed here. Where can I get more information?   Your pharmacist can provide more information about topiramate. Remember, keep this and all other medicines out of the reach of children, never share your medicines with others, and use this medication only for the indication prescribed. Every effort has been made to ensure that the information provided by [address] is accurate, up-to-date, and complete, but no guarantee is made to that effect. Drug information contained herein may be time sensitive. [facility] information has been compiled for use by healthcare practitioners and consumers in the [place] and therefore [facility] does not warrant that uses outside of the [place] are appropriate, unless specifically indicated otherwise. [facility]'s drug information does not endorse drugs, diagnose patients or recommend therapy. [facility]'s drug information is an informational resource designed to assist licensed healthcare practitioners in caring for their patients and/or to serve consumers viewing this service as a supplement to, and not a substitute for, the expertise, skill, knowledge and judgment of healthcare practitioners. The absence of a warning for a given drug or drug combination in no way should be construed to indicate that the drug or drug combination is safe, effective or appropriate for any given patient. [facility] does not assume any responsibility for any aspect of healthcare administered with the aid of information [facility] provides. The information contained herein is not intended to cover all possible uses, directions, precautions, warnings, drug interactions, allergic reactions, or adverse effects. If you have questions about the drugs you are taking, check with your doctor, nurse or pharmacist.  Copyright 6896-5805 [address]: .01. Revision date: 11/24/2020. Care instructions adapted under license by [facility] ([facility]).  If you have questions about a medical condition or this instruction, always ask your healthcare professional. Yoli Saenz disclaims any warranty or liability for your use of this information.
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
## 2021-07-26 ENCOUNTER — TELEPHONE (OUTPATIENT)
Dept: PSYCHIATRY | Age: 56
End: 2021-07-26

## 2021-07-26 DIAGNOSIS — M79.2 NEURALGIA: Primary | ICD-10-CM

## 2021-07-26 RX ORDER — DULOXETIN HYDROCHLORIDE 20 MG/1
20 CAPSULE, DELAYED RELEASE ORAL 2 TIMES DAILY
Qty: 60 CAPSULE | Refills: 0 | Status: SHIPPED | OUTPATIENT
Start: 2021-07-26 | End: 2021-08-20 | Stop reason: SDUPTHER

## 2021-07-26 NOTE — TELEPHONE ENCOUNTER
7/26/21 [id number]    Called the patient to discuss her nerve pain and headaches as relates to Topamax. In reviewing her records, it appears that she was doing better when she was on Duloxetine with a BZD (Valium). It may be that she would benefit from restarting Duloxetine or a tricyclic which would treat nerve pain with a mood stabilizer (Topamax) to manage mood stabilization. Will start Duloxetine, 20mg, BID, for 2 weeks in conjunction with Topamax. She verbalized understanding. Will order labs, TSH, B12 and CMP, CBC.     ALESSANDRA Couch-NP

## 2021-07-27 ENCOUNTER — TRANSCRIBE ORDERS (OUTPATIENT)
Dept: GENERAL RADIOLOGY | Facility: HOSPITAL | Age: 56
End: 2021-07-27

## 2021-07-27 ENCOUNTER — HOSPITAL ENCOUNTER (OUTPATIENT)
Dept: GENERAL RADIOLOGY | Facility: HOSPITAL | Age: 56
Discharge: HOME | End: 2021-07-27
Admitting: INTERNAL MEDICINE

## 2021-07-27 DIAGNOSIS — E04.1 THYROID NODULE: ICD-10-CM

## 2021-07-27 DIAGNOSIS — J44.9 OBSTRUCTIVE CHRONIC BRONCHITIS WITHOUT EXACERBATION (HCC): Primary | ICD-10-CM

## 2021-07-27 DIAGNOSIS — M48.02 CERVICAL SPINAL STENOSIS: ICD-10-CM

## 2021-07-27 DIAGNOSIS — M51.16 LUMBAR DISC DISEASE WITH RADICULOPATHY: ICD-10-CM

## 2021-07-27 DIAGNOSIS — J44.9 OBSTRUCTIVE CHRONIC BRONCHITIS WITHOUT EXACERBATION (HCC): ICD-10-CM

## 2021-07-27 PROCEDURE — 72110 X-RAY EXAM L-2 SPINE 4/>VWS: CPT

## 2021-07-27 PROCEDURE — 71046 X-RAY EXAM CHEST 2 VIEWS: CPT

## 2021-07-27 PROCEDURE — 72040 X-RAY EXAM NECK SPINE 2-3 VW: CPT

## 2021-07-29 ENCOUNTER — HOSPITAL ENCOUNTER (OUTPATIENT)
Dept: ULTRASOUND IMAGING | Facility: HOSPITAL | Age: 56
Discharge: HOME | End: 2021-07-29
Admitting: INTERNAL MEDICINE

## 2021-07-29 PROCEDURE — 76536 US EXAM OF HEAD AND NECK: CPT

## 2021-08-02 ENCOUNTER — TRANSCRIBE ORDERS (OUTPATIENT)
Dept: ADMINISTRATIVE | Facility: HOSPITAL | Age: 56
End: 2021-08-02

## 2021-08-02 ENCOUNTER — TELEPHONE (OUTPATIENT)
Dept: PSYCHIATRY | Age: 56
End: 2021-08-02

## 2021-08-02 DIAGNOSIS — M79.2 NEURALGIA: ICD-10-CM

## 2021-08-02 DIAGNOSIS — M48.02 STENOSIS, CERVICAL SPINE: Primary | ICD-10-CM

## 2021-08-02 RX ORDER — TOPIRAMATE 50 MG/1
TABLET, FILM COATED ORAL
Qty: 120 TABLET | Refills: 1 | OUTPATIENT
Start: 2021-08-02 | End: 2021-09-01

## 2021-08-02 RX ORDER — TOPIRAMATE 100 MG/1
TABLET, FILM COATED ORAL
Qty: 60 TABLET | Refills: 2 | Status: SHIPPED | OUTPATIENT
Start: 2021-08-02 | End: 2021-09-16 | Stop reason: ALTCHOICE

## 2021-08-02 RX ORDER — GABAPENTIN 300 MG/1
CAPSULE ORAL
Qty: 180 CAPSULE | Refills: 0 | Status: SHIPPED | OUTPATIENT
Start: 2021-08-02 | End: 2021-09-16 | Stop reason: ALTCHOICE
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
## 2021-08-02 NOTE — TELEPHONE ENCOUNTER
Pt made aware that RX for Gabapentin and Topamax had been sent to her pharmacy per [facility]. Reviewed with her that the Topamax was 100 mg pills and read directions to her. Pt verbalized understanding and plans to follow.

## 2021-08-03 ENCOUNTER — TELEPHONE (OUTPATIENT)
Dept: PSYCHIATRY | Age: 56
End: 2021-08-03

## 2021-08-04 ENCOUNTER — TRANSCRIBE ORDERS (OUTPATIENT)
Dept: LAB | Facility: HOSPITAL | Age: 56
End: 2021-08-04

## 2021-08-04 DIAGNOSIS — Z01.818 PREOPERATIVE TESTING: Primary | ICD-10-CM

## 2021-08-06 ENCOUNTER — LAB (OUTPATIENT)
Dept: LAB | Facility: HOSPITAL | Age: 56
End: 2021-08-06

## 2021-08-06 DIAGNOSIS — Z01.818 PREOPERATIVE TESTING: ICD-10-CM

## 2021-08-06 LAB — SARS-COV-2 ORF1AB RESP QL NAA+PROBE: NOT DETECTED

## 2021-08-06 PROCEDURE — U0004 COV-19 TEST NON-CDC HGH THRU: HCPCS

## 2021-08-06 PROCEDURE — C9803 HOPD COVID-19 SPEC COLLECT: HCPCS

## 2021-08-09 ENCOUNTER — OFFICE VISIT (OUTPATIENT)
Dept: OTOLARYNGOLOGY | Facility: CLINIC | Age: 56
End: 2021-08-09

## 2021-08-09 ENCOUNTER — HOSPITAL ENCOUNTER (OUTPATIENT)
Dept: MRI IMAGING | Facility: HOSPITAL | Age: 56
Discharge: HOME | End: 2021-08-09
Admitting: INTERNAL MEDICINE

## 2021-08-09 VITALS
HEART RATE: 81 BPM | TEMPERATURE: 98.4 F | WEIGHT: 135.4 LBS | HEIGHT: 62 IN | DIASTOLIC BLOOD PRESSURE: 86 MMHG | BODY MASS INDEX: 24.92 KG/M2 | SYSTOLIC BLOOD PRESSURE: 154 MMHG

## 2021-08-09 DIAGNOSIS — K21.9 LARYNGOPHARYNGEAL REFLUX: Primary | ICD-10-CM

## 2021-08-09 DIAGNOSIS — E04.1 THYROID NODULE: ICD-10-CM

## 2021-08-09 DIAGNOSIS — R49.0 HOARSENESS: ICD-10-CM

## 2021-08-09 DIAGNOSIS — M48.02 STENOSIS, CERVICAL SPINE: ICD-10-CM

## 2021-08-09 DIAGNOSIS — R09.89 GLOBUS SENSATION: ICD-10-CM

## 2021-08-09 DIAGNOSIS — Z72.0 TOBACCO ABUSE: ICD-10-CM

## 2021-08-09 LAB — CREAT BLDA-MCNC: 0.8 MG/DL (ref 0.6–1.3)

## 2021-08-09 PROCEDURE — 31575 DIAGNOSTIC LARYNGOSCOPY: CPT | Performed by: OTOLARYNGOLOGY

## 2021-08-09 PROCEDURE — 99204 OFFICE O/P NEW MOD 45 MIN: CPT | Performed by: NURSE PRACTITIONER

## 2021-08-09 PROCEDURE — 82565 ASSAY OF CREATININE: CPT

## 2021-08-09 PROCEDURE — 72156 MRI NECK SPINE W/O & W/DYE: CPT

## 2021-08-09 PROCEDURE — A9577 INJ MULTIHANCE: HCPCS | Performed by: INTERNAL MEDICINE

## 2021-08-09 PROCEDURE — 0 GADOBENATE DIMEGLUMINE 529 MG/ML SOLUTION: Performed by: INTERNAL MEDICINE

## 2021-08-09 RX ORDER — CYCLOBENZAPRINE HCL 5 MG
TABLET ORAL
COMMUNITY
Start: 2021-07-29

## 2021-08-09 RX ORDER — ESOMEPRAZOLE MAGNESIUM 40 MG/1
40 CAPSULE, DELAYED RELEASE ORAL 2 TIMES DAILY
Qty: 60 CAPSULE | Refills: 2 | Status: SHIPPED | OUTPATIENT
Start: 2021-08-09 | End: 2021-09-08

## 2021-08-09 RX ORDER — ESOMEPRAZOLE MAGNESIUM 40 MG/1
40 CAPSULE, DELAYED RELEASE ORAL DAILY
COMMUNITY
Start: 2021-07-27 | End: 2021-08-09

## 2021-08-09 RX ADMIN — GADOBENATE DIMEGLUMINE 12 ML: 529 INJECTION, SOLUTION INTRAVENOUS at 11:55

## 2021-08-09 NOTE — PROGRESS NOTES
OPERATIVE NOTE:  Rossana He    DATE OF PROCEDURE: 8/9/2021    PROCEDURE:   Flexible Fiberoptic Laryngoscopy    ANESTHESIA:  None    REASON FOR PROCEDURE:  Procedure was recommend for persistant hoarseness and globus sensation  Risks, benefits and alternatives were discussed.      DETAILS of OPERATION:  The patient was seated in the exam chair.  A flexible fiberoptic laryngoscopy was performed through the oral cavity.  The scope was introduced into the oral cavity and directed to the level of the glottis, examining the structures of the oropharynx, base of tongue, vallecula, supraglottic larynx, glottic larynx, and hypopharynx.      FINDINGS:  Mucosal surfaces:   The mucosal surfaces demonstrated normal mucosa surfaces with moderate inflammation    Base of tongue:  The base of tongue was found to have no mass or lesion.  (There was no mass or lesion by visualization or palpation including bimanual palpation at the base of the tongue-performed during a separate part of the exam.)    Epiglottis:  The epiglottis was found to have no mass or lesion.    Aryepligottic fold:  The AE folds were found to have no mass or lesion.    False Vocal Fold:  The false cords were found to have no mass or lesion.    True Vocal Cord:  The true vocal cords were found to have no mass or lesion. Both true vocal cords adduct and abduct normally.  Though no masses were noted moderate edema was noted of both true vocal folds without polypoid change.  No nodules were appreciated.    Arytenoid:   The arytenoids were found to have moderate inter-arytenoid edema with erythema.    Hypopharynx:  The hypopharynx was found to have no mass or lesion.    The patient tolerated procedure well.

## 2021-08-09 NOTE — PATIENT INSTRUCTIONS
CONTACT INFORMATION:  The main office phone number is [phone]. For emergencies after hours and on weekends, this number will convert over to our answering service and the on call provider will answer. Please try to keep non emergent phone calls/ questions to office hours 9am-5pm Monday through Friday.      [facility]  As an alternative, you can sign up and use the Epic MyChart system for more direct and quicker access for non emergent questions/ problems.  [employer] allows you to send messages to your doctor, view your test results, renew your prescriptions, schedule appointments, and more. To sign up, go to [employer] and click on the Sign Up Now link in the New User? box. Enter your [facility] Activation Code exactly as it appears below along with the last four digits of your Social Security Number and your Date of Birth () to complete the sign-up process. If you do not sign up before the expiration date, you must request a new code.     [facility] Activation Code: Activation code not generated  Current [facility] Status: Active     If you have questions, you can email [email or web address] or call [phone] to talk to our [facility] staff. Remember, [employer] [facility] is NOT to be used for urgent needs. For medical emergencies, dial 911.     IF YOU SMOKE OR USE TOBACCO PLEASE READ THE FOLLOWING:  Why is smoking bad for me?  Smoking increases the risk of heart disease, lung disease, vascular disease, stroke, and cancer. If you smoke, STOP!        IF YOU SMOKE OR USE TOBACCO PLEASE READ THE FOLLOWING:  Why is smoking bad for me?  Smoking increases the risk of heart disease, lung disease, vascular disease, stroke, and cancer. If you smoke, STOP!     For more information:  Quit Now [place]  -QUIT-NOW  [email or web address]    I advised the patient of the risks in continuing to use tobacco and recommended complete cessation, The inherent risks including the risk of disability,  development of a malignancy and/or death was discussed.  The patient indicated understanding.        Gastroesophageal Reflux Disease (Laryngopharyngeal Reflux), Adult  Gastroesophageal reflux disease (GERD) and/or Laryngopharyngeal Reflux, (LPR) happens when acid from your stomach flows up into the esophagus and/or throat and voicebox or larynx. When acid comes in contact with the these organs, the acid can cause soreness (inflammation). Over time, GERD may create small holes (ulcers) in the lining of the esophagus and may lead to the development of hoarseness, difficulty swallowing,   feeling of something stuck in the throat, increased mucous or drainage and even predispose to the development of malignancies, (cancer).    CAUSES   · Increased body weight. This puts pressure on the stomach, making acid rise from the stomach into the esophagus.  · Smoking. This increases acid production in the stomach.  · Drinking alcohol. This causes decreased pressure in the lower esophageal sphincter (valve or ring of muscle between the esophagus and stomach), allowing acid from the stomach into the esophagus.  · Late evening meals and a full stomach. This increases pressure and acid production in the stomach.  · A malformed lower esophageal sphincter  · Diet which can include avoidance of gluten and dairy products  · Age  SYMPTOMS   · Burning pain in the lower part of the mid-chest behind the breastbone and in the mid-stomach area. This may occur twice a week or more often.  · Trouble swallowing.  · Sore throat.  · Dry cough.  · Asthma-like symptoms including chest tightness, shortness of breath, or wheezing.  · Globus sensation-something stuck in the throat/fullness  · Hoarseness  DIAGNOSIS   Your caregiver may be able to diagnose GERD based on your symptoms. In some cases, X-rays and other tests may be done to check for complications or to check the condition of your stomach and esophagus.  You may need to see another  doctor.  TREATMENT   Over-the-counter or prescription medicines to help decrease acid production.   Dietary and behavioral modifications or changes may be also recommended.  HOME CARE INSTRUCTIONS   · Change the factors that you can control. Ask your caregiver for guidance concerning weight loss, quitting smoking, and alcohol consumption.  · Avoid foods and drinks that make your symptoms worse, and MAY include such as:  ¨ Caffeine or alcoholic drinks.  ¨ Chocolate.  ¨ Gluten containing foods  ¨ Dairy  ¨ Peppermint or mint flavorings.  ¨ Garlic and onions.  ¨ Spicy foods.  ¨ Citrus fruits, such as oranges, rama, or limes.  ¨ Tomato-based foods such as sauce, chili, salsa, and pizza.  ¨ Fried and fatty foods.  · Avoid lying down for the 3 hours prior to your bedtime or prior to taking a nap.  · Eat small, frequent meals instead of large meals.  · Wear loose-fitting clothing. Do not wear anything tight around your waist that causes pressure on your stomach.  · Raise the head of your bed 6 to 8 inches with wood blocks to help you sleep. Extra pillows will not help.  · Only take over-the-counter or prescription medicines for pain, discomfort, or fever as directed by your caregiver.  · Do not take aspirin, ibuprofen, or other nonsteroidal anti-inflammatory drugs if possible (NSAIDs).  SEEK IMMEDIATE MEDICAL CARE IF:   · You have pain in your arms, neck, jaw, teeth, or back.  · Your pain increases or changes in intensity or duration.  · You develop nausea, vomiting, or sweating (diaphoresis).  · You develop shortness of breath, or you faint.  · Your vomit is green, yellow, black, or looks like coffee grounds or blood.  · Your stool is red, bloody, or black.  These symptoms could be signs of other problems, such as heart disease, gastric bleeding, or esophageal bleeding.  MAKE SURE YOU:   · Understand these instructions.  · Will watch your condition.  · Will get help right away if you are not doing well or get worse.      This information is not intended to replace advice given to you by your physician. Make sure you discuss any questions you have with your health care provider.    Consider eliminating dairy products   Take nexium twice per day   30 min prior to breakfast and 30 min prior to last meal of the day   Smoking cessation   Call if no improvement prior to next appointment

## 2021-08-09 NOTE — PROGRESS NOTES
YOB: 1965  Location: [facility]  Location Address: [address] [place] [address], [facility], [address] [place] [address]  Location Phone: [phone]    Chief Complaint   Patient presents with   • Hoarse     sore throat, swallowing issues, throat clearing, coughing up green pheglm       History of Present Illness  Rossana He is a 55 y.o. female.  Rossana He is here for evaluation of ENT complaints. The patient has had problems with sore throat and hoarseness  The symptoms are localized to the right side. The patient has had mild to moderate symptoms. The symptoms have been present for the last several months There have been no identified factors that aggravate the symptoms. There have been no factors that have improved the symptoms.  Patient presents complaining of voice change that seemed to be sudden along with sore throat; she states this has been going on for approximately 3 - 4 months. It tends to be worse in the evening.   She is also having some swallowing issues from time to time; worse when she lays flat. She complains of globulus sensation as well. She complains of a painful, feeling of enlargement to the left side of her neck   She was recently started on nexium a couple weeks ago; she is taking before breakfast daily; no resolution of symptoms   She currently smokes about 3/4 ppd and is trying to quit        Past Medical History:   Diagnosis Date   • Anxiety    • Arthritis    • Chronic pain syndrome    • COVID-19 vaccine series started     [employer]   • Fibromyalgia    • Hyperlipidemia    • Hypertension    • Migraines    • Multiple thyroid nodules     hx multinodular goiter   • Osteoporosis    • Palpitation     neg work up   • Thoracic spondylosis disc disease       Past Surgical History:   Procedure Laterality Date   • ANTERIOR CERVICAL FUSION      C4-5, C5-6 with Dr. Villa   •  SECTION     • CHOLECYSTECTOMY     • COLONOSCOPY  2014    erythematous mucosa sigmoid, descending, and  transverse colon, benign, externial hemorrhoids, Dr. López   • ENDOSCOPY  08/19/2014    Gastritis, normal esophagus dilated 48 Fr, Dr. López       Outpatient Medications Marked as Taking for the 8/9/21 encounter (Office Visit) with Ja Maloney MD   Medication Sig Dispense Refill   • celecoxib (CeleBREX) 200 MG capsule Take 200 mg by mouth Daily.  6   • cyclobenzaprine (FLEXERIL) 5 MG tablet 1 TABLET AT BEDTIME AS NEEDED ORALLY ONCE A DAY 30 DAY(S)     • gabapentin (NEURONTIN) 300 MG capsule Take 900 mg by mouth 3 (Three) Times a Day.     • propranolol LA (INDERAL LA) 80 MG 24 hr capsule Take 80 mg by mouth Daily.  0   • [DISCONTINUED] DULoxetine (CYMBALTA) 60 MG capsule Take 60 mg by mouth Daily.  2   • [DISCONTINUED] esomeprazole (nexIUM) 40 MG capsule Take 40 mg by mouth Daily.     • [DISCONTINUED] predniSONE (DELTASONE) 10 MG tablet Take 1 tablet by mouth Daily. 7 tablet 0       Sulfa antibiotics    Family History   Problem Relation Age of Onset   • Hypertension Mother    • Rheum arthritis Mother    • Arthritis Father    • Cancer Father    • Heart disease Father    • Hypertension Father        Social History     Socioeconomic History   • Marital status:      Spouse name: Not on file   • Number of children: Not on file   • Years of education: Not on file   • Highest education level: Not on file   Tobacco Use   • Smoking status: Current Every Day Smoker     Packs/day: 1.00     Start date: 1982   • Smokeless tobacco: Never Used   Vaping Use   • Vaping Use: Former   • Quit date: 12/20/2020   Substance and Sexual Activity   • Alcohol use: No   • Drug use: No   • Sexual activity: Defer       Review of Systems   Constitutional: Negative.    HENT: Positive for sore throat and trouble swallowing.    Eyes: Negative.    Cardiovascular: Negative.    Gastrointestinal: Negative.    Genitourinary: Negative.    Musculoskeletal: Negative.    Neurological: Negative.    Hematological: Negative.      US Thyroid  (07/29/2021 11:38)   Nodule 1 TR1  Nodule 2 TR2  Nodule 3 TR3 less than 2.5 cm           Vitals:    08/09/21 1539   BP: 154/86   Pulse: 81   Temp: 98.4 °F (36.9 °C)       Body mass index is 24.76 kg/m².    Objective     Physical Exam  Vitals reviewed.   Constitutional:       Appearance: She is normal weight.   HENT:      Head: Normocephalic.      Right Ear: Hearing, tympanic membrane, ear canal and external ear normal.      Left Ear: Hearing, tympanic membrane, ear canal and external ear normal.      Nose: Nose normal.      Mouth/Throat:      Tongue: No lesions.      Palate: No mass and lesions.      Pharynx: Oropharynx is clear. Uvula midline.      Comments: See endoscopy   Neck:      Thyroid: No thyroid mass, thyromegaly or thyroid tenderness.      Trachea: Trachea normal.   Pulmonary:      Effort: Pulmonary effort is normal.   Neurological:      Mental Status: She is alert.   Psychiatric:         Behavior: Behavior is cooperative.         Assessment/Plan   Diagnoses and all orders for this visit:    1. Laryngopharyngeal reflux (Primary)    2. Hoarseness    3. Globus sensation    4. Thyroid nodule    5. Tobacco abuse    Other orders  -     esomeprazole (nexIUM) 40 MG capsule; Take 1 capsule by mouth 2 (two) times a day for 30 days.  Dispense: 60 capsule; Refill: 2    Will increase nexium to bid   If no improvement prior to next appointment will obtain ct soft tissue neck     * Surgery not found *  No orders of the defined types were placed in this encounter.    Return in about 8 weeks (around 10/4/2021) for Recheck throat.       Patient Instructions   CONTACT INFORMATION:  The main office phone number is [phone]. For emergencies after hours and on weekends, this number will convert over to our answering service and the on call provider will answer. Please try to keep non emergent phone calls/ questions to office hours 9am-5pm Monday through Friday.      [employer]  As an alternative, you can sign up and use the  Epic MyChart system for more direct and quicker access for non emergent questions/ problems.  [employer] allows you to send messages to your doctor, view your test results, renew your prescriptions, schedule appointments, and more. To sign up, go to [employer] and click on the Sign Up Now link in the New User? box. Enter your [employer] Activation Code exactly as it appears below along with the last four digits of your Social Security Number and your Date of Birth () to complete the sign-up process. If you do not sign up before the expiration date, you must request a new code.     [facility] [facility] Activation Code: Activation code not generated  Current [employer] Status: Active     If you have questions, you can email [email or web address] or call [phone] to talk to our [employer] staff. Remember, [employer] is NOT to be used for urgent needs. For medical emergencies, dial 911.     IF YOU SMOKE OR USE TOBACCO PLEASE READ THE FOLLOWING:  Why is smoking bad for me?  Smoking increases the risk of heart disease, lung disease, vascular disease, stroke, and cancer. If you smoke, STOP!        IF YOU SMOKE OR USE TOBACCO PLEASE READ THE FOLLOWING:  Why is smoking bad for me?  Smoking increases the risk of heart disease, lung disease, vascular disease, stroke, and cancer. If you smoke, STOP!     For more information:  Quit Now [place]  -QUIT-NOW  [email or web address]    I advised the patient of the risks in continuing to use tobacco and recommended complete cessation, The inherent risks including the risk of disability, development of a malignancy and/or death was discussed.  The patient indicated understanding.        Gastroesophageal Reflux Disease (Laryngopharyngeal Reflux), Adult  Gastroesophageal reflux disease (GERD) and/or Laryngopharyngeal Reflux, (LPR) happens when acid from your stomach flows up into the esophagus and/or throat and voicebox or larynx. When acid comes in  contact with the these organs, the acid can cause soreness (inflammation). Over time, GERD may create small holes (ulcers) in the lining of the esophagus and may lead to the development of hoarseness, difficulty swallowing,   feeling of something stuck in the throat, increased mucous or drainage and even predispose to the development of malignancies, (cancer).    CAUSES   · Increased body weight. This puts pressure on the stomach, making acid rise from the stomach into the esophagus.  · Smoking. This increases acid production in the stomach.  · Drinking alcohol. This causes decreased pressure in the lower esophageal sphincter (valve or ring of muscle between the esophagus and stomach), allowing acid from the stomach into the esophagus.  · Late evening meals and a full stomach. This increases pressure and acid production in the stomach.  · A malformed lower esophageal sphincter  · Diet which can include avoidance of gluten and dairy products  · Age  SYMPTOMS   · Burning pain in the lower part of the mid-chest behind the breastbone and in the mid-stomach area. This may occur twice a week or more often.  · Trouble swallowing.  · Sore throat.  · Dry cough.  · Asthma-like symptoms including chest tightness, shortness of breath, or wheezing.  · Globus sensation-something stuck in the throat/fullness  · Hoarseness  DIAGNOSIS   Your caregiver may be able to diagnose GERD based on your symptoms. In some cases, X-rays and other tests may be done to check for complications or to check the condition of your stomach and esophagus.  You may need to see another doctor.  TREATMENT   Over-the-counter or prescription medicines to help decrease acid production.   Dietary and behavioral modifications or changes may be also recommended.  HOME CARE INSTRUCTIONS   · Change the factors that you can control. Ask your caregiver for guidance concerning weight loss, quitting smoking, and alcohol consumption.  · Avoid foods and drinks that make  your symptoms worse, and MAY include such as:  ¨ Caffeine or alcoholic drinks.  ¨ Chocolate.  ¨ Gluten containing foods  ¨ Dairy  ¨ Peppermint or mint flavorings.  ¨ Garlic and onions.  ¨ Spicy foods.  ¨ Citrus fruits, such as oranges, rama, or limes.  ¨ Tomato-based foods such as sauce, chili, salsa, and pizza.  ¨ Fried and fatty foods.  · Avoid lying down for the 3 hours prior to your bedtime or prior to taking a nap.  · Eat small, frequent meals instead of large meals.  · Wear loose-fitting clothing. Do not wear anything tight around your waist that causes pressure on your stomach.  · Raise the head of your bed 6 to 8 inches with wood blocks to help you sleep. Extra pillows will not help.  · Only take over-the-counter or prescription medicines for pain, discomfort, or fever as directed by your caregiver.  · Do not take aspirin, ibuprofen, or other nonsteroidal anti-inflammatory drugs if possible (NSAIDs).  SEEK IMMEDIATE MEDICAL CARE IF:   · You have pain in your arms, neck, jaw, teeth, or back.  · Your pain increases or changes in intensity or duration.  · You develop nausea, vomiting, or sweating (diaphoresis).  · You develop shortness of breath, or you faint.  · Your vomit is green, yellow, black, or looks like coffee grounds or blood.  · Your stool is red, bloody, or black.  These symptoms could be signs of other problems, such as heart disease, gastric bleeding, or esophageal bleeding.  MAKE SURE YOU:   · Understand these instructions.  · Will watch your condition.  · Will get help right away if you are not doing well or get worse.     This information is not intended to replace advice given to you by your physician. Make sure you discuss any questions you have with your health care provider.    Consider eliminating dairy products   Take nexium twice per day   30 min prior to breakfast and 30 min prior to last meal of the day   Smoking cessation   Call if no improvement prior to next appointment

## 2021-08-10 NOTE — PROGRESS NOTES
Ja Maloney MD   I have seen and/or examined Rossana He and have reviewed the notes, assessments, and/or procedures and I concur with this documentation.    Ja Maloney MD, FACS  08/10/21  9:13 AM CDT
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
## 2021-08-23 ENCOUNTER — TELEPHONE (OUTPATIENT)
Dept: PSYCHIATRY | Age: 56
End: 2021-08-23

## 2021-08-23 RX ORDER — DULOXETIN HYDROCHLORIDE 20 MG/1
20 CAPSULE, DELAYED RELEASE ORAL 2 TIMES DAILY
Qty: 180 CAPSULE | Refills: 0 | Status: SHIPPED | OUTPATIENT
Start: 2021-08-23 | End: 2021-09-16

## 2021-08-23 NOTE — TELEPHONE ENCOUNTER
Attempted to contact pt to inform her that RX for Duloxetine had been sent to her pharmacy per Eugenio APARICIO-no answer.

## 2021-08-25 ENCOUNTER — TELEPHONE (OUTPATIENT)
Dept: PSYCHIATRY | Age: 56
End: 2021-08-25

## 2021-08-25 NOTE — TELEPHONE ENCOUNTER
Called pt to reschedule appt from 8/27 with Ashley Cadena per providers request.    Rescheduled for 9/16 @1:30    Electronically signed by Mae Mayberry MA on 8/25/2021 at 1:57 PM

## 2021-09-15 ENCOUNTER — TELEPHONE (OUTPATIENT)
Dept: PSYCHIATRY | Age: 56
End: 2021-09-15

## 2021-09-15 NOTE — TELEPHONE ENCOUNTER
Called pt for appointment reminder.     -Pt confirmed      Electronically signed by Porfirio Sims MA on 9/15/2021 at 3:54 PM

## 2021-09-16 ENCOUNTER — TELEPHONE (OUTPATIENT)
Dept: PSYCHIATRY | Age: 56
End: 2021-09-16

## 2021-09-16 ENCOUNTER — VIRTUAL VISIT (OUTPATIENT)
Dept: PSYCHIATRY | Age: 56
End: 2021-09-16
Payer: MEDICAID

## 2021-09-16 DIAGNOSIS — F99 INSOMNIA DUE TO OTHER MENTAL DISORDER: ICD-10-CM

## 2021-09-16 DIAGNOSIS — G62.9 NEUROPATHY: ICD-10-CM

## 2021-09-16 DIAGNOSIS — F39 MOOD DISORDER (HCC): ICD-10-CM

## 2021-09-16 DIAGNOSIS — F41.0 PANIC DISORDER: Primary | ICD-10-CM

## 2021-09-16 DIAGNOSIS — F51.05 INSOMNIA DUE TO OTHER MENTAL DISORDER: ICD-10-CM

## 2021-09-16 PROCEDURE — 99443 PR PHYS/QHP TELEPHONE EVALUATION 21-30 MIN: CPT | Performed by: NURSE PRACTITIONER

## 2021-09-16 RX ORDER — GABAPENTIN 100 MG/1
100 CAPSULE ORAL DAILY PRN
Qty: 30 CAPSULE | Refills: 0 | Status: SHIPPED | OUTPATIENT
Start: 2021-09-16 | End: 2022-02-08

## 2021-09-16 ASSESSMENT — ANXIETY QUESTIONNAIRES
GAD7 TOTAL SCORE: 17
2. NOT BEING ABLE TO STOP OR CONTROL WORRYING: 2-OVER HALF THE DAYS
7. FEELING AFRAID AS IF SOMETHING AWFUL MIGHT HAPPEN: 2-OVER HALF THE DAYS
1. FEELING NERVOUS, ANXIOUS, OR ON EDGE: 3-NEARLY EVERY DAY
4. TROUBLE RELAXING: 3-NEARLY EVERY DAY
3. WORRYING TOO MUCH ABOUT DIFFERENT THINGS: 2-OVER HALF THE DAYS
6. BECOMING EASILY ANNOYED OR IRRITABLE: 2-OVER HALF THE DAYS
5. BEING SO RESTLESS THAT IT IS HARD TO SIT STILL: 3-NEARLY EVERY DAY

## 2021-09-16 ASSESSMENT — PATIENT HEALTH QUESTIONNAIRE - PHQ9
SUM OF ALL RESPONSES TO PHQ9 QUESTIONS 1 & 2: 3
SUM OF ALL RESPONSES TO PHQ QUESTIONS 1-9: 14
2. FEELING DOWN, DEPRESSED OR HOPELESS: 2
6. FEELING BAD ABOUT YOURSELF - OR THAT YOU ARE A FAILURE OR HAVE LET YOURSELF OR YOUR FAMILY DOWN: 1
9. THOUGHTS THAT YOU WOULD BE BETTER OFF DEAD, OR OF HURTING YOURSELF: 0
3. TROUBLE FALLING OR STAYING ASLEEP: 2
8. MOVING OR SPEAKING SO SLOWLY THAT OTHER PEOPLE COULD HAVE NOTICED. OR THE OPPOSITE, BEING SO FIGETY OR RESTLESS THAT YOU HAVE BEEN MOVING AROUND A LOT MORE THAN USUAL: 2
5. POOR APPETITE OR OVEREATING: 3
1. LITTLE INTEREST OR PLEASURE IN DOING THINGS: 1
7. TROUBLE CONCENTRATING ON THINGS, SUCH AS READING THE NEWSPAPER OR WATCHING TELEVISION: 2
SUM OF ALL RESPONSES TO PHQ QUESTIONS 1-9: 14
4. FEELING TIRED OR HAVING LITTLE ENERGY: 1
SUM OF ALL RESPONSES TO PHQ QUESTIONS 1-9: 14

## 2021-09-16 NOTE — PATIENT INSTRUCTIONS
Plan:  Decrease:  Gabapentin, 100mg, daily as needed    Stop: Duloxetine, 20mg, twice daily (never started)    Stop:  Topamax    Prescribed by other providers:  Clonazepam, 0.5mg BID (Dr. Ramone Segura)       Follow up: No follow-ups on file. SAFETY PLAN    A suicide Safety Plan is a document that supports someone when they are having thoughts of suicide. Warning Signs that indicate a suicidal crisis may be developing: What (situations, thoughts, feelings, body sensations, behaviors, etc.) do you experience that lets you know you are beginning to think about suicide? 1. thoughts  2. Anxiety, feeling overwhelmed      Internal Coping Strategies:  What things can I do (relaxation techniques, hobbies, physical activities, etc.) to take my mind off my problems without contacting another person? 1. Sits down, closes her eyes,  2. Mindfulness      People and social settings that provide distraction: Who can I call or where can I go to distract me? 1. Name: Mom  Phone: in her phone  2. Name: Dad  Phone: in her phone   3. Place: Home            4. Place: Living Room    People whom I can ask for help: Who can I call when I need help - for example, friends, family, clergy, someone else? 1. Name: Mom                Phone: in her phone  2. Name: Dad  Phone: in her phone    Professionals or [address] [place] [address] I can contact during a crisis: Who can I call for help - for example, my doctor, my psychiatrist, my psychologist, a mental health provider, a suicide hotline? 1. Clinician Name: [facility]   Phone: [phone], opt 3      Clinician Pager or Emergency Contact #: [phone]    3. Clinician Name: [facility], [address] [place] [address]   Phone: [phone], opt 3      Clinician Pager or Emergency Contact #: [phone]    0. Suicide Prevention Lifeline: 5-339-586-TALK (6238)    4.  [address] [place] [address] Emergency Services -  for example, [facility] suicide [facility], [facility] Hotline: 911      Emergency Services Address: [facility] [address]      Emergency Services Phone: [phone]    Making the environment safe: How can I make my environment (house/apartment/living space) safer? For example, can I remove guns, medications, and other items? 1. Remove medications    I want you to know that I have enjoyed working with you and have enjoyed my time here in [place]. I have found the people in [place] very kind people, very patient and polite. I will miss you as I return to [place]. This was a personal decision as I want to spend my custodial years closer to my sons who live in [place].  I wish you the very best.

## 2021-09-16 NOTE — TELEPHONE ENCOUNTER
Pt was called for virtual appointment check in.       Electronically signed by Sherryle Meeter, MA on 9/16/2021 at 1:04 PM
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
## 2021-09-21 ENCOUNTER — OFFICE VISIT (OUTPATIENT)
Dept: NEUROSURGERY | Age: 56
End: 2021-09-21
Payer: MEDICAID

## 2021-09-21 VITALS
HEIGHT: 62 IN | DIASTOLIC BLOOD PRESSURE: 74 MMHG | WEIGHT: 144 LBS | BODY MASS INDEX: 26.5 KG/M2 | OXYGEN SATURATION: 100 % | SYSTOLIC BLOOD PRESSURE: 126 MMHG | HEART RATE: 84 BPM

## 2021-09-21 DIAGNOSIS — R20.2 PARESTHESIA: Primary | ICD-10-CM

## 2021-09-21 PROCEDURE — 99204 OFFICE O/P NEW MOD 45 MIN: CPT | Performed by: PSYCHIATRY & NEUROLOGY

## 2021-09-21 RX ORDER — CLONAZEPAM 1 MG/1
0.5 TABLET ORAL 2 TIMES DAILY
COMMUNITY
Start: 2021-09-16

## 2021-09-21 RX ORDER — CYCLOBENZAPRINE HCL 5 MG
1 TABLET ORAL DAILY
COMMUNITY
Start: 2021-07-29 | End: 2022-02-08

## 2021-09-21 RX ORDER — MULTIVIT,TX WITH IRON,MINERALS
200 TABLET, EXTENDED RELEASE ORAL DAILY
COMMUNITY
End: 2022-05-23

## 2021-09-21 NOTE — PROGRESS NOTES
[facility] Neurology Office Note      Patient:   Pola Tellez  MR#:    [id number]  Account Number:                         YOB: 1965  Date of Evaluation:  2021  Time of Note:                          2:36 PM  Primary/Referring Physician:  Tate Ramachandran DO   Consulting Physician:  Lis Shultz DO    NEW PATIENT CONSULTATION    Chief Complaint   Patient presents with    Numbness     New patient referral radiates from the top of her to her [address]    Pola Tellez is a 54y.o. year old female here for paresthesias. Symptoms started a year or so ago, noting burning paresthesias involving the extremities, head and face. Also noting possible numbness as well. She is attributing it to propranolol, stopped and the facial symptoms improved, but still noting symptoms in the extremities. Notes some neck and back pain. Prior C-spine surgery noted. Recent MRI C-spine with post-op changes, ddd, nf narrowing which is moderate to severe but no significant spinal stenosis noted. Some intermittent weakness noted in the legs and right upper extremity at times. Some gait difficulty and unsteadiiness noted as well.       Past Medical History:   Diagnosis Date    Anxiety     Arthritis     Cervical spinal stenosis     Chest pain     COPD (chronic obstructive pulmonary disease) (HCC)     Depression     Dysphagia     Fibromyalgia     GERD (gastroesophageal reflux disease)     Headache(784.0)     migraines    Hip pain     Hypertension     Lung nodule seen on imaging study Oct 23    8mm on lower right lobe, yet to be investigated Faith     Narcotic dependence ([facility] [address] 2011    Treated in rehab in past    Neck pain     Nicotine dependence     Urgency-frequency syndrome     UTI (urinary tract infection)        Past Surgical History:   Procedure Laterality Date    CERVICAL SPINE SURGERY       SECTION      CHOLECYSTECTOMY         Family History   Problem Relation Age of Onset    Colon Polyps Father     Lung Cancer Father     Heart Disease Father         stents    Depression Father     Colon Polyps Paternal Grandmother     Colon Cancer Paternal Grandmother     Depression Sister     Esophageal Cancer Neg Hx     Liver Cancer Neg Hx     Stomach Cancer Neg Hx        Social History     Socioeconomic History    Marital status:      Spouse name: Not on file    Number of children: Not on file    Years of education: Not on file    Highest education level: Not on file   Occupational History    Not on file   Tobacco Use    Smoking status: Current Every Day Smoker     Packs/day: 1.00     Start date: 12/31/2020    Smokeless tobacco: Never Used   Vaping Use    Vaping Use: Former    Quit date: 12/31/2020    Substances: Always   Substance and Sexual Activity    Alcohol use: No    Drug use: No    Sexual activity: Not Currently   Other Topics Concern    Not on file   Social History Narrative    PRIOR MED TRIALS    Buspirone (did nothing)    Xanax (didn't work)    Valium (effective)    Ativan (put her in another world, forgetfulness)    Prozac (increased anxiety)    Zoloft (doesn't remember)    Lexapro (doesn't remember)    Amitriptyline (long time ago, put her in another world)    Trazodone (stopped April, 2021, believes it was causing the pins/needles feeling that she was experiencing)    Duloxetine (worked well at 60mg, but more recently reports increased anxiety and gave her headaches)    Paxil (has taken in the past)    Hydroxyzine (makes her sleepy, not effective for anxiety)    Abilify (made her facial muscles tighten, akathisia, stopped in April, 2021, restarted, on 5/3/21 says that it was giving her heart palpitations)    Lamictal (small red rash underneath the skin on her abdomen, only took for 3 days in March, 2021)    Gabapentin (started 3/11/21, initially for the pins/needles feeling, increased the dose on 4/21/21 to help with anxiety, strained. Her daughter is 35yr old. She says that this hurts her, they used to talk every day and that for an unknown reason she is gone. .        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 She describes that since this time she has struggled with anxiety and a feeling that bugs are crawling on her skin. She reports that she has thoughts which race around in her head and that she feels very anxious and her muscles feel tense and rigid. She says that this feeling just won't go away. She says she is afraid to touch things. She also describes OCD s/s such as washing her hands frequently, feeling like things are dirty, not wanting to touch things. Hodan Hernandez FAMILY PSYCHIATRIC HISTORY    Father, anxiety, depression, mood swings, anger/irritability    Daughter, ADHD    . 5/3/2021  Mood Disorder Questionnaire: + 8    Question 2: Yes  Question 3: serious     (darryl symptoms endorsed: racing thoughts (constant), being easily distracted, insomnia, anger/irritability, feeling more self-confident, having more energy, being more active, being more social)              Social Determinants of Health     Financial Resource Strain:     Difficulty of Paying Living Expenses:    Food Insecurity:     Worried About Running Out of Food in the Last Year:     [address] in the Last Year:    Transportation Needs:     Lack of Transportation (Medical):      Lack of Transportation (Non-Medical):    Physical Activity:     Days of Exercise per Week:     Minutes of Exercise per Session:    Stress:     Feeling of Stress :    Social Connections:     Frequency of Communication with Friends and Family:     Frequency of Social Gatherings with Friends and Family:     Attends [employer] Services:     Active Member of Clubs or Organizations:     Attends Club or Organization Meetings:    Edvin Martion Marital Status:    Intimate Partner Violence:     Fear of Current or Ex-Partner:     Emotionally Abused:     Physically Abused:     Sexually Abused:        Current Outpatient Medications   Medication Sig Dispense Refill    clonazePAM (KLONOPIN) 1 MG tablet Take 0.5 tablets by mouth 2 times daily.  cyclobenzaprine (FLEXERIL) 5 MG tablet Take 1 tablet by mouth daily      Magnesium Gluconate 250 MG TABS Take 200 mg by mouth daily      gabapentin (NEURONTIN) 100 MG capsule Take 1 capsule by mouth daily as needed (neuropathy) for up to 30 days. 30 capsule 0    CELEBREX 200 MG capsule 200 mg daily. No current facility-administered medications for this visit.        Allergies   Allergen Reactions    Morphine      Shaking, makes me cold    Sulfa Antibiotics Rash         REVIEW OF SYSTEMS    Constitutional: []Fever []Sweat []Chills [] Recent Injury [x] Denies all unless marked  HEENT:[]Headache  [] Head Injury/Hearing Loss  [] Sore Throat  [] Ear Ache/Dizziness  [x] Denies all unless marked  Spine:  [] Neck pain  [x] Back pain  [] Sciaticia  [x] Denies all unless marked  Cardiovascular:[]Heart Disease []Chest Pain [] Palpitations  [x] Denies all unless marked  Pulmonary: []Shortness of Breath []Cough   [x] Denies all unless marke  Gastrointestinal: []Nausea  []Vomiting  []Abdominal Pain  []Constipation  []Diarrhea  []Dark Bloody Stools  [x] Denies all unless marked  Psychiatric/Behavioral:[] Depression [] Anxiety [x] Denies all unless marked  Genitourinary:   [] Frequency  [] Urgency  [] Incontinence [] Pain with Urination  [x] Denies all unless marked  Extremities: [x]Pain  []Swelling  [x] Denies all unless marked  Musculoskeletal: [x] Muscle Pain  [] Joint Pain  [] Arthritis [] Muscle Cramps [] Muscle Twitches  [x] Denies all unless marked  Sleep: [] Insomnia [] Snoring [] Restless Legs [] Sleep Apnea  [] Daytime Sleepiness  [x] Denies all unless marked  Skin:[] Rash [] Skin Discoloration [x] Denies all bradykinesia noted  COMMENTS:   Sensory  [x]Sensation intact to light touch, pin prick, vibration, and proprioception BLE  [x]Sensation intact to light touch, pin prick, vibration, and proprioception BUE  COMMENTS:   Coordination [x]FTN normal bilaterally   [x]HTS normal bilaterally  [x]DONNA normal bilaterally. COMMENTS:   Reflexes  [x]Symmetric and non-pathological  [x]Toes down going bilaterally  [x]No clonus present  COMMENTS:   Gait                  [x]Normal steady gait    []Ataxic    []Spastic     []Magnetic     []Shuffling  COMMENTS:       LABS RECORD AND IMAGING REVIEW (As below and per HPI)    Lab Results   Component Value Date    WBC 5.4 04/26/2019    HGB 12.1 04/26/2019    HCT 37.8 04/26/2019    MCV 96.9 04/26/2019     04/26/2019     Lab Results   Component Value Date     04/26/2019    K 3.5 04/26/2019     04/26/2019    CO2 30 (H) 04/26/2019    BUN 7 04/26/2019    CREATININE 0.6 04/26/2019    GLUCOSE 68 (L) 04/26/2019    CALCIUM 9.5 04/26/2019    PROT 7.2 04/26/2019    LABALBU 4.1 04/26/2019    BILITOT <0.2 04/26/2019    ALKPHOS 70 04/26/2019    AST 22 04/26/2019    ALT 16 04/26/2019    LABGLOM >60 04/26/2019     MRI C-spine reviewed. Primary records reviewed. ASSESSMENT:    Fiona Myers is a 54y.o. year old female here for widespread painful paresthesias and numbness, back and neck pain, prior C-spine surgery with recent MRI C-spine with moderate to severe nf narrowing multiple levels in the cervical spine , but no overt spinal stenosis. Plan further workup to try and clarify the source of her symptoms. PLAN:  1. MRI brain  2. MRI T/L spine  3. NCS/EMG   4. Additional labs  5. May consider neurosurgery re-evaluation given ddd noted on C-spine if worsens, hold off for now. 6.  Further treatment pending above, failed neurontin in the past, has also been in pain management.      Madelaine Cunha DO  Board Certified Neurology

## 2021-09-30 ENCOUNTER — TRANSCRIBE ORDERS (OUTPATIENT)
Dept: ADMINISTRATIVE | Facility: HOSPITAL | Age: 56
End: 2021-09-30

## 2021-09-30 ENCOUNTER — HOSPITAL ENCOUNTER (OUTPATIENT)
Dept: NEUROLOGY | Age: 56
Discharge: HOME | End: 2021-09-30
Payer: MEDICAID

## 2021-09-30 DIAGNOSIS — R20.2 PARESTHESIA: Primary | ICD-10-CM

## 2021-09-30 DIAGNOSIS — R20.2 PARESTHESIA: ICD-10-CM

## 2021-09-30 PROCEDURE — 95913 NRV CNDJ TEST 13/> STUDIES: CPT | Performed by: PSYCHIATRY & NEUROLOGY

## 2021-09-30 PROCEDURE — 95913 NRV CNDJ TEST 13/> STUDIES: CPT

## 2021-09-30 PROCEDURE — 95886 MUSC TEST DONE W/N TEST COMP: CPT | Performed by: PSYCHIATRY & NEUROLOGY

## 2021-09-30 PROCEDURE — 95886 MUSC TEST DONE W/N TEST COMP: CPT

## 2021-10-04 ENCOUNTER — TELEPHONE (OUTPATIENT)
Dept: NEUROLOGY | Age: 56
End: 2021-10-04

## 2021-10-04 NOTE — TELEPHONE ENCOUNTER
Patient called stating that she is scheduled to have the MRI Thoracic at [facility] on 10/18 but they did not receive an order or precert. Please fax over necessary information.

## 2021-10-05 NOTE — TELEPHONE ENCOUNTER
Called patient to let her know that her orders had been re faxed to [facility] this morning that they should be reaching out to her to schedule

## 2021-10-06 ENCOUNTER — TRANSCRIBE ORDERS (OUTPATIENT)
Dept: ADMINISTRATIVE | Facility: HOSPITAL | Age: 56
End: 2021-10-06

## 2021-10-06 ENCOUNTER — TRANSCRIBE ORDERS (OUTPATIENT)
Dept: LAB | Facility: HOSPITAL | Age: 56
End: 2021-10-06

## 2021-10-06 DIAGNOSIS — Z01.818 PREOP TESTING: Primary | ICD-10-CM

## 2021-10-12 ENCOUNTER — TRANSCRIBE ORDERS (OUTPATIENT)
Dept: ADMINISTRATIVE | Facility: HOSPITAL | Age: 56
End: 2021-10-12

## 2021-10-12 DIAGNOSIS — M54.6 THORACIC BACK PAIN, UNSPECIFIED BACK PAIN LATERALITY, UNSPECIFIED CHRONICITY: ICD-10-CM

## 2021-10-12 DIAGNOSIS — R20.2 PARESTHESIA: Primary | ICD-10-CM

## 2021-10-18 ENCOUNTER — HOSPITAL ENCOUNTER (OUTPATIENT)
Dept: MRI IMAGING | Facility: HOSPITAL | Age: 56
Discharge: HOME | End: 2021-10-18

## 2021-10-18 DIAGNOSIS — R20.2 PARESTHESIA: ICD-10-CM

## 2021-10-18 LAB — CREAT BLDA-MCNC: 0.8 MG/DL (ref 0.6–1.3)

## 2021-10-18 PROCEDURE — A9577 INJ MULTIHANCE: HCPCS | Performed by: PSYCHIATRY & NEUROLOGY

## 2021-10-18 PROCEDURE — 70553 MRI BRAIN STEM W/O & W/DYE: CPT

## 2021-10-18 PROCEDURE — 0 GADOBENATE DIMEGLUMINE 529 MG/ML SOLUTION: Performed by: PSYCHIATRY & NEUROLOGY

## 2021-10-18 PROCEDURE — 82565 ASSAY OF CREATININE: CPT

## 2021-10-18 PROCEDURE — 72148 MRI LUMBAR SPINE W/O DYE: CPT

## 2021-10-18 RX ADMIN — GADOBENATE DIMEGLUMINE 11 ML: 529 INJECTION, SOLUTION INTRAVENOUS at 11:14

## 2021-11-08 ENCOUNTER — OFFICE VISIT (OUTPATIENT)
Dept: NEUROSURGERY | Age: 56
End: 2021-11-08
Payer: MEDICAID

## 2021-11-08 VITALS
SYSTOLIC BLOOD PRESSURE: 108 MMHG | WEIGHT: 144 LBS | DIASTOLIC BLOOD PRESSURE: 70 MMHG | HEART RATE: 90 BPM | OXYGEN SATURATION: 98 % | BODY MASS INDEX: 26.5 KG/M2 | HEIGHT: 62 IN

## 2021-11-08 DIAGNOSIS — M54.42 CHRONIC BILATERAL LOW BACK PAIN WITH BILATERAL SCIATICA: ICD-10-CM

## 2021-11-08 DIAGNOSIS — M54.41 CHRONIC BILATERAL LOW BACK PAIN WITH BILATERAL SCIATICA: ICD-10-CM

## 2021-11-08 DIAGNOSIS — G89.29 CHRONIC BILATERAL THORACIC BACK PAIN: ICD-10-CM

## 2021-11-08 DIAGNOSIS — M54.6 CHRONIC BILATERAL THORACIC BACK PAIN: ICD-10-CM

## 2021-11-08 DIAGNOSIS — G89.29 CHRONIC BILATERAL LOW BACK PAIN WITH BILATERAL SCIATICA: ICD-10-CM

## 2021-11-08 DIAGNOSIS — R20.2 PARESTHESIA: Primary | ICD-10-CM

## 2021-11-08 PROCEDURE — 99214 OFFICE O/P EST MOD 30 MIN: CPT | Performed by: PSYCHIATRY & NEUROLOGY
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
## 2021-12-02 ENCOUNTER — HOSPITAL ENCOUNTER (OUTPATIENT)
Dept: MRI IMAGING | Facility: HOSPITAL | Age: 56
End: 2021-12-02

## 2021-12-08 ENCOUNTER — HOSPITAL ENCOUNTER (OUTPATIENT)
Dept: MRI IMAGING | Facility: HOSPITAL | Age: 56
End: 2021-12-08

## 2021-12-16 ENCOUNTER — HOSPITAL ENCOUNTER (OUTPATIENT)
Dept: MRI IMAGING | Facility: HOSPITAL | Age: 56
Discharge: HOME | End: 2021-12-16
Admitting: PSYCHIATRY & NEUROLOGY

## 2021-12-16 DIAGNOSIS — R20.2 PARESTHESIA: ICD-10-CM

## 2021-12-16 DIAGNOSIS — M54.6 THORACIC BACK PAIN, UNSPECIFIED BACK PAIN LATERALITY, UNSPECIFIED CHRONICITY: ICD-10-CM

## 2021-12-16 PROCEDURE — 72146 MRI CHEST SPINE W/O DYE: CPT

## 2022-01-10 ENCOUNTER — TELEPHONE (OUTPATIENT)
Dept: NEUROSURGERY | Age: 57
End: 2022-01-10

## 2022-01-10 NOTE — TELEPHONE ENCOUNTER
Patient called stating that since her last ov on 11/8/2021 she is having more problems with numbness and tingling in both arms, legs, and in her back. She is asking what else she can do to help. Patient was referred to pain management per Dr Rian Patel last office note, but she canceled that appt due to a family emergency. PLAN:  1. Re-try MRI T-spine  2. May consider neurosurgery re-evaluation given ddd noted on C/L-spine if worsens, hold off for now. 3.  Further treatment pending above, failed neurontin in the past, has also been in pain management in the past without benefit. Will refer to pain management here for another opinion.      Armando Cox,   Board Certified Neurology      Please advise.

## 2022-01-10 NOTE — TELEPHONE ENCOUNTER
Has she had MRI t spine done? She needs to be re-scheduled with pain management, keep upcoming appt with Dr. Genevieve Vaughn.

## 2022-01-11 NOTE — TELEPHONE ENCOUNTER
Pt called back stating that she had T-Spine MRI done with [facility] on 12/16, downloaded in media 12/28/21. Pt will bring in disc next appt with Dr. Ángela Pacheco. Pt states she doesn't want to r.s. with pain management, however pt is asking if she can start back on Gabapentin 300mg 2 tablets 3x daily. Pt states she has close to 100 tablets left. Please contact pt to discuss.     Thank you

## 2022-01-12 NOTE — TELEPHONE ENCOUNTER
Dr. Delaney Beavers has never prescribed the Gabapentin before per her OJ. She needs to discuss with that provider or see pain management.

## 2022-01-13 ENCOUNTER — TELEPHONE (OUTPATIENT)
Dept: PSYCHIATRY | Age: 57
End: 2022-01-13

## 2022-01-13 NOTE — TELEPHONE ENCOUNTER
Tried to call the patient vm was full will try again later.   Patient called back  Asked her to empty her VM so she was able to get vms also for her to wait on the gabapentin until seen by the Dr Aashish Beebe or pain management

## 2022-01-13 NOTE — TELEPHONE ENCOUNTER
Pt called stating she wanted to go back on Neurotin. Pt stated she had the med from a past RX. Pt was last seen in this office on 9/16/21 by Leann Don. Per the APRN note the pt was not to have a f/u appt here due to pt wanted to F/U with her PCP only. Pt reminded of this and stated she would like to be seen again at this office. Pt stated she was not suicidal.  \"No nothing like that\". Pt informed that she would need to have her PCP send in a referral since she ended treatment here. Pt made aware that the first appt opening for a pt not seen by the provider in the past is mid March. Pt verbalized understanding and stated that she would have her PCP send in the referral. Above info discussed with Jose APARICIO who agreed with the plan.

## 2022-01-19 ENCOUNTER — TELEPHONE (OUTPATIENT)
Dept: PSYCHIATRY | Age: 57
End: 2022-01-19

## 2022-01-19 NOTE — TELEPHONE ENCOUNTER
Pt stated she requested a referral from Dr Inge Manley office. She was informed it has not been received. She will call his office again to request.  Pt was last seen by Charles Mcnamara in Sept 2021 at that time she stated she wanted to f/u with her PCP rather than at [address]. Pt stated that she went back on the Neurotin from an old RX she had and is taking Neurotin 300 mg 3 times a day. She stated that it is helping with the numbness and tingling. Pt stated she had weaned herself off the med in the past and also stopped some of her other meds as she wanted to see how she would do without them. She stated that she has seen Dr Shilpa Montero regarding the numbness and tingling and he had done MRIs from her brain to her spine. She stated that Dr Shilpa Montero thinks her spine issues could be causing the numbness and tingling. She stated he will not give the ok for her to go back on the Neurotin until she sees him in Feb.  \"So I just put myself back on it with my old prescription\". Pt stated that she went off some of her other meds because she did not feel that she was Bipolar. Pt stated that she had tried several mood stabilizers in the past  and \"they knocked me into another world\". Pt stated that she has \"had depression all my life\". Pt stated that she does not want to get out of the house. Pt stated that she is withdrawn from people, has low motivation and lots of fears. She stated when she is off of the Neurotin there are times she cannot stand her clothes to touch her. CARLO Srivastava APRN given the above info and pt informed that he could not do any med changes without seeing her. His first available appt is 2/21/22 which pt is now scheduled for. Pt denied any suicidal thoughts. She stated that was not an issue. She said if she ever had any suicidal thoughts she would \"absolutely go to ER\". Pt will call back if she starts feeling worse.   She was informed that she has been placed on the cancellation list.  Pt verbalized understanding of info provided and agreeable to the plan. She said she will call as needed.

## 2022-01-25 ENCOUNTER — TELEPHONE (OUTPATIENT)
Dept: PSYCHIATRY | Age: 57
End: 2022-01-25

## 2022-01-25 NOTE — TELEPHONE ENCOUNTER
Pt contacted and informed that the office mgr says that she does not have to have a referral from her doctor since she has been seen in this office in the last year. Pt reminded of her appt with Tarun APARICIO in Feb.  Pt reported that her PCP did send in a RX for Gabapentin since it was helping her.

## 2022-02-08 ENCOUNTER — OFFICE VISIT (OUTPATIENT)
Dept: NEUROSURGERY | Age: 57
End: 2022-02-08
Payer: MEDICAID

## 2022-02-08 VITALS
HEART RATE: 84 BPM | OXYGEN SATURATION: 98 % | SYSTOLIC BLOOD PRESSURE: 110 MMHG | DIASTOLIC BLOOD PRESSURE: 74 MMHG | HEIGHT: 62 IN | BODY MASS INDEX: 26.5 KG/M2 | WEIGHT: 144 LBS

## 2022-02-08 DIAGNOSIS — G89.29 CHRONIC BILATERAL LOW BACK PAIN WITH BILATERAL SCIATICA: ICD-10-CM

## 2022-02-08 DIAGNOSIS — M54.41 CHRONIC BILATERAL LOW BACK PAIN WITH BILATERAL SCIATICA: ICD-10-CM

## 2022-02-08 DIAGNOSIS — G89.29 CHRONIC BILATERAL THORACIC BACK PAIN: ICD-10-CM

## 2022-02-08 DIAGNOSIS — R20.2 PARESTHESIA: Primary | ICD-10-CM

## 2022-02-08 DIAGNOSIS — M54.42 CHRONIC BILATERAL LOW BACK PAIN WITH BILATERAL SCIATICA: ICD-10-CM

## 2022-02-08 DIAGNOSIS — M54.6 CHRONIC BILATERAL THORACIC BACK PAIN: ICD-10-CM

## 2022-02-08 PROCEDURE — 99214 OFFICE O/P EST MOD 30 MIN: CPT | Performed by: PSYCHIATRY & NEUROLOGY

## 2022-02-08 RX ORDER — ATENOLOL 25 MG/1
1 TABLET ORAL DAILY
COMMUNITY
Start: 2022-01-15

## 2022-02-08 RX ORDER — GABAPENTIN 300 MG/1
1 CAPSULE ORAL 3 TIMES DAILY
COMMUNITY
Start: 2022-01-20
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
## 2022-02-11 ENCOUNTER — TELEPHONE (OUTPATIENT)
Dept: NEUROSURGERY | Age: 57
End: 2022-02-11

## 2022-02-11 NOTE — TELEPHONE ENCOUNTER
[facility] New Patient Questionnaire    1. Diagnosis/Reason for Referral?    Chronic bilateral low back pain with bilateral sciatica    2. Who is completing questionnaire? Patient X Caregiver Family      3. Has the patient had any previous spinal/brain surgeries? YES        A. If yes, what is the name of the facility in which the surgery was performed? [facility]     B. Procedure/Surgery performed? One [address] REPLACED   C. Who was the surgeon? DR. STEPH ACHARYA. When was the surgery? 2010       E. Did the patient improve after the surgery? YES        4. Is this a second opinion? If yes, Dr. Elle Aden would like to review patient first before making the appointment. 5. Have MRI Images been obtain within the last year? Yes X No      XR  CT     If yes, where was the imaging performed? [facility]   If yes, what part of the body? Lumbar X Cervical  Thoracic X Brain X     If yes, when was it obtained?     Note: if the scan was performed at a facility other than [address] [place] [address], the disc will need to be brought to the appointment or we need to reach out to obtain the disc. A. Was the patient instructed to provide the disc? Yes   X No      8. Has the patient had a NCV/EMG within the last year? Yes X No     If yes, where was it performed and date? 10- Location: [facility]      9. Has the patient been to Physical Therapy? Yes  No X     If yes, what location, how long attended, and last visit? Location:        Therapy Lasted:    Date of Last Visit:      10. Has the patient been to Pain Management? Yes  No X     If yes, what location and last visit     Location:   Last Visit:   Is it helping?

## 2022-02-14 ENCOUNTER — TELEPHONE (OUTPATIENT)
Dept: NEUROSURGERY | Age: 57
End: 2022-02-14

## 2022-02-15 ENCOUNTER — OFFICE VISIT (OUTPATIENT)
Dept: NEUROSURGERY | Age: 57
End: 2022-02-15
Payer: MEDICAID

## 2022-02-15 VITALS
OXYGEN SATURATION: 100 % | HEART RATE: 70 BPM | BODY MASS INDEX: 24.29 KG/M2 | WEIGHT: 132 LBS | DIASTOLIC BLOOD PRESSURE: 79 MMHG | SYSTOLIC BLOOD PRESSURE: 142 MMHG | HEIGHT: 62 IN

## 2022-02-15 DIAGNOSIS — M54.42 CHRONIC BILATERAL LOW BACK PAIN WITH BILATERAL SCIATICA: ICD-10-CM

## 2022-02-15 DIAGNOSIS — R20.2 PARESTHESIA: ICD-10-CM

## 2022-02-15 DIAGNOSIS — M54.2 NECK PAIN: ICD-10-CM

## 2022-02-15 DIAGNOSIS — G89.29 CHRONIC BILATERAL LOW BACK PAIN WITH BILATERAL SCIATICA: ICD-10-CM

## 2022-02-15 DIAGNOSIS — M51.36 DDD (DEGENERATIVE DISC DISEASE), LUMBAR: ICD-10-CM

## 2022-02-15 DIAGNOSIS — M51.34 DDD (DEGENERATIVE DISC DISEASE), THORACIC: Primary | ICD-10-CM

## 2022-02-15 DIAGNOSIS — M54.41 CHRONIC BILATERAL LOW BACK PAIN WITH BILATERAL SCIATICA: ICD-10-CM

## 2022-02-15 PROCEDURE — 99214 OFFICE O/P EST MOD 30 MIN: CPT | Performed by: NURSE PRACTITIONER

## 2022-02-15 RX ORDER — CETIRIZINE HYDROCHLORIDE 10 MG/1
10 TABLET ORAL DAILY
COMMUNITY
End: 2022-05-23

## 2022-02-15 ASSESSMENT — ENCOUNTER SYMPTOMS
BACK PAIN: 1
GASTROINTESTINAL NEGATIVE: 1
RESPIRATORY NEGATIVE: 1
EYES NEGATIVE: 1

## 2022-02-15 NOTE — PROGRESS NOTES
[facility]  Office Visit      Chief Complaint   Patient presents with    Referral - General    Lower Back Pain    Tingling    Extremity Weakness    Neck Pain       HISTORY OF PRESENT ILLNESS:    Sloan Garcia is a 64 y.o. female who carries a diagnosis of fibromyalgia and COPD with a history of previous ACDF C4-5, C5-6 per Dr. Antonella Arreola on 8/23/2010 who presents with neck pain, back pain and paresthesias of the head, face, arms, and legs and has been followed by Dr. Kelli Diaz. She does complain of neck pain and thoracic back pain with right sided rib pain. The pain does radiate into the posterior aspect of the BLE and into the plantar surfaces of the feet. She has been placed on gabapentin which she states has helped about 80%. She has also tried pain management with Dr. Anju Mauro in the past and states she was on too many medications that she ended up quitting and getting off her pain medications. The patient has underwent a non-operative treatment course that has included:  NSAIDs (Celebrex)  Muscle Relaxers (flexeril)  Gabapentin  Opiates (does not like them)  Physical Therapy   Epidural Steroid Injections (Dr. Anju Mauro)      Of note she does use tobacco and does not take blood thinning medications.                Past Medical History:   Diagnosis Date    Anxiety     Arthritis     Cervical spinal stenosis     Chest pain     COPD (chronic obstructive pulmonary disease) (HCC)     Depression     Dysphagia     Fibromyalgia     GERD (gastroesophageal reflux disease)     Headache(784.0)     migraines    Hip pain     Hypertension     Lung nodule seen on imaging study Oct 23    8mm on lower right lobe, yet to be investigated [facility]     Narcotic dependence ([facility] [address] 6/2011    Treated in rehab in past    Neck pain     Nicotine dependence     Urgency-frequency syndrome     UTI (urinary tract infection)        Past Surgical History:   Procedure Laterality Date    CERVICAL SPINE SURGERY       SECTION      CHOLECYSTECTOMY         Current Outpatient Medications   Medication Sig Dispense Refill    cetirizine (ZYRTEC) 10 MG tablet Take 10 mg by mouth daily      gabapentin (NEURONTIN) 300 MG capsule Take 1 capsule by mouth 3 times daily.  atenolol (TENORMIN) 25 MG tablet Take 1 tablet by mouth daily      clonazePAM (KLONOPIN) 1 MG tablet Take 0.5 tablets by mouth 2 times daily.  Magnesium Gluconate 250 MG TABS Take 200 mg by mouth daily      CELEBREX 200 MG capsule 200 mg daily. No current facility-administered medications for this visit. Allergies:  Morphine and Sulfa antibiotics    Social History:   Social History     Tobacco Use   Smoking Status Current Every Day Smoker    Packs/day: 1.00    Start date: 2020   Smokeless Tobacco Never Used     Social History     Substance and Sexual Activity   Alcohol Use No         Family History:   Family History   Problem Relation Age of Onset    Colon Polyps Father     Lung Cancer Father     Heart Disease Father         stents    Depression Father     Colon Polyps Paternal Grandmother     Colon Cancer Paternal Grandmother     Depression Sister     Esophageal Cancer Neg Hx     Liver Cancer Neg Hx     Stomach Cancer Neg Hx        REVIEW OF SYSTEMS:  Constitutional: Negative. HENT: Negative. Eyes: Negative. Respiratory: Negative. Cardiovascular: Negative. Gastrointestinal: Negative. Genitourinary: Negative. Musculoskeletal: Positive for back pain, myalgias and neck pain. Skin: Negative. Neurological: Positive for tingling and weakness. Endo/Heme/Allergies: Negative. Psychiatric/Behavioral: Negative. PHYSICAL EXAM:  Vitals:    02/15/22 1118   BP: (!) 142/79   Pulse: 70   SpO2: 100%     Constitutional: appears well-developed and well-nourished.    Eyes - conjunctiva normal.  Pupils react to light  Ear, nose, throat - hearing intact to finger rub, No scars, masses, or lesions over external nose or ears, no atrophy oftongue  Neck- symmetric, no masses noted, no jugular vein distension  Respiration- chest wall appears symmetric, good expansion, normal effort without use of accessory muscles  Musculoskeletal - no significant wasting of muscles noted, no bony deformities, gait no gross ataxia  Extremities- no clubbing, cyanosis oredema  Skin - warm, dry, and intact. No rash, erythema, or pallor. Psychiatric - mood, affect, and behavior appear normal.     Neurologic Examination  Awake, Alert and oriented x 4  Normal speech pattern, following commands    Motor:  RIGHT: hand grasp 5/5    finger extension 5/5    bicep 5/5    triceps 5/5    deltoid 5/5      iliopsoas 5/5    knee flexor 5/5    knee extension 5/5    EHL/dorsiflexion 5/5    plantar flexion 5/5    LEFT:   hand grasp 5/5    finger extension 5/5    bicep 5/5    triceps 5/5    deltoid 5/5      iliopsoas 5/5    knee flexor 5/5    knee extension 5/5    EHL/dorsiflexion 5/5    plantar flexion 5/5    Decrease to pinprick sensation BLE patchy loss; non-dermatomal  Reflexes are 2+ and symmetric  No clonus or Hoffmans sign  No myofacial tenderness to palpation  Normal Gait pattern        DATA and IMAGING:    Nursing/pcp notes, imaging, labs, and vitals reviewed.      PT,OT and/or speech notes reviewed    Lab Results   Component Value Date    WBC 6.5 09/24/2021    HGB 13.4 09/24/2021    HCT 41.3 09/24/2021    MCV 97.4 09/24/2021     09/24/2021     Lab Results   Component Value Date     09/24/2021    K 4.7 09/24/2021     09/24/2021    CO2 26 09/24/2021    BUN 10 09/24/2021    CREATININE 0.7 09/24/2021    GLUCOSE 99 09/24/2021    CALCIUM 9.8 09/24/2021    PROT 7.2 09/24/2021    PROT 7.0 09/24/2021    LABALBU 4.6 09/24/2021    LABALBU 4.21 09/24/2021    BILITOT 0.3 09/24/2021    ALKPHOS 100 09/24/2021    AST 18 09/24/2021    ALT 12 09/24/2021    LABGLOM >60 09/24/2021    GFRAA >59 09/24/2021   No results found for: INR, PROTIME    MRI brain (10/18/2021) [facility]   I have personally reviewed these images and my interpretation is:  No acute intracranial abnormalities noted       MRI Thoracic Spine (12/16/2021) [facility]  I have personally reviewed the images and my interpretation is: There is DDD throughout  No significant neural element compression noted       MRI Lumbar Spine (12/16/2021) [facility]   I have personally reviewed the images and my interpretation is: There is DDD throughout  L4-5 mild right foraminal stenosis  L5-S1 mild to moderate right foraminal stenosis       NCS/EMG (10/01/2021) Dr. Santos Echavarria  Interpretation:  Mild CTS     ASSESSMENT:    Devin Rider is a 64 y.o. female with complaints of pain throughout her entire spine and paresthesias of the entire body. ICD-10-CM    1. DDD (degenerative disc disease), thoracic  M51.34    2. DDD (degenerative disc disease), lumbar  M51.36    3. Paresthesia  R20.2    4. Neck pain  M54.2    5. Chronic bilateral low back pain with bilateral sciatica  M54.42     M54.41     G89.29        PLAN:  We have discussed and reviewed the results of the MRI brain, thoracic, and lumbar spine with Ms. Deana Velasquez at length. We explained that she does have DDD throughout her thoracic and lumbar spine with some mild narrowing, however, there is no major neural element compression that would warrant a surgical intervention nor is it related to her paresthesias throughout her entire body.   At this point, we are going to recommend she continue with non-operative treatments that include more PT, massage therapy, pain management, acupuncture, etc. She has been referred to our pain management team here at [place] and is waiting on a call.     -Follow up as needed           ALESSANDRA Bray

## 2022-02-15 NOTE — PROGRESS NOTES
Review of Systems   Constitutional: Negative. HENT: Negative. Eyes: Negative. Respiratory: Negative. Cardiovascular: Negative. Gastrointestinal: Negative. Genitourinary: Negative. Musculoskeletal: Positive for back pain, myalgias and neck pain. Skin: Negative. Neurological: Positive for tingling and weakness. Endo/Heme/Allergies: Negative. Psychiatric/Behavioral: Negative.

## 2022-02-18 ENCOUNTER — TELEPHONE (OUTPATIENT)
Dept: PSYCHIATRY | Age: 57
End: 2022-02-18

## 2022-02-18 NOTE — TELEPHONE ENCOUNTER
Called pt for appointment reminder.     -unable to leave Vm  -vm is full      Electronically signed by Alma Craig MA on 2/18/2022 at 1:24 PM

## 2022-02-21 ENCOUNTER — TELEPHONE (OUTPATIENT)
Dept: PSYCHIATRY | Age: 57
End: 2022-02-21

## 2022-02-21 NOTE — TELEPHONE ENCOUNTER
Called pt to see if she wanted to reschedule her appt that she canceled through epic.     No answer  VM was full    Electronically signed by Alma Craig MA on 2/21/2022 at 3:18 PM

## 2022-03-21 ENCOUNTER — TRANSCRIBE ORDERS (OUTPATIENT)
Dept: ADMINISTRATIVE | Facility: HOSPITAL | Age: 57
End: 2022-03-21

## 2022-03-21 DIAGNOSIS — R10.11 RUQ ABDOMINAL PAIN: Primary | ICD-10-CM

## 2022-03-22 ENCOUNTER — HOSPITAL ENCOUNTER (OUTPATIENT)
Dept: PAIN MANAGEMENT | Age: 57
Discharge: HOME | End: 2022-03-22
Payer: MEDICAID

## 2022-03-22 VITALS
BODY MASS INDEX: 22.32 KG/M2 | RESPIRATION RATE: 16 BRPM | TEMPERATURE: 97.2 F | OXYGEN SATURATION: 98 % | HEIGHT: 63 IN | WEIGHT: 126 LBS | HEART RATE: 61 BPM

## 2022-03-22 DIAGNOSIS — M62.830 MUSCLE SPASM OF BACK: ICD-10-CM

## 2022-03-22 DIAGNOSIS — M54.50 CHRONIC BILATERAL LOW BACK PAIN WITHOUT SCIATICA: ICD-10-CM

## 2022-03-22 DIAGNOSIS — G89.29 CHRONIC BILATERAL LOW BACK PAIN WITHOUT SCIATICA: ICD-10-CM

## 2022-03-22 DIAGNOSIS — M47.24 THORACIC RADICULOPATHY DUE TO OSTEOARTHRITIS OF SPINE: ICD-10-CM

## 2022-03-22 DIAGNOSIS — Z02.89 PAIN MEDICATION AGREEMENT: ICD-10-CM

## 2022-03-22 PROCEDURE — 99214 OFFICE O/P EST MOD 30 MIN: CPT | Performed by: NURSE PRACTITIONER

## 2022-03-22 PROCEDURE — 99215 OFFICE O/P EST HI 40 MIN: CPT

## 2022-03-22 RX ORDER — TRAMADOL HYDROCHLORIDE 50 MG/1
50 TABLET ORAL EVERY 8 HOURS PRN
Qty: 75 TABLET | Refills: 2 | Status: SHIPPED | OUTPATIENT
Start: 2022-03-22 | End: 2022-05-23 | Stop reason: ALTCHOICE

## 2022-03-22 ASSESSMENT — PAIN SCALES - GENERAL: PAINLEVEL_OUTOF10: 7

## 2022-03-22 ASSESSMENT — PAIN DESCRIPTION - PAIN TYPE: TYPE: CHRONIC PAIN

## 2022-03-22 ASSESSMENT — PAIN DESCRIPTION - LOCATION: LOCATION: NECK;BACK;SHOULDER

## 2022-03-22 ASSESSMENT — ENCOUNTER SYMPTOMS
BACK PAIN: 1
BOWEL INCONTINENCE: 0
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
## 2022-03-22 NOTE — PROGRESS NOTES
Clinic Documentation      Education Provided:  [x] Review of Alma Newton  [] Agreement Review  [x] PEG Score Calculated [x] PHQ Score Calculated [x] ORT Score Calculated    [] Compliance Issues Discussed [] Cognitive Behavior Needs [x] Exercise [] Review of Test [] Financial Issues  [x] Tobacco/Alcohol Use Reviewed [x] Teaching [x] New Patient [] Picture Obtained    Physician Plan:  [] Outgoing Referral  [] Pharmacy Consult  [] Test Ordered [x] Prescription Ordered/Changed   [] Obtained Test Results / Consult Notes        Complete if patient is withholding blood thinner for procedure     Blood Thinner Patient is currently taking:      [] Plavix (Hold for 7 days)  [] Aspirin (Hold for 5 days)     [] Pletal (Hold for 2 days)  [] Pradaxa (Hold for 3 days)    [] Effient (Hold for 7 days)  [] Xarelto (Hold for 2 days)    [] Eliquis (Hold for 2 days)  [] Brilinta (Hold for 7 days)    [] Coumadin (Hold for 5 days) - (INR needs to be drawn the day prior to procedure- INR < 2.0)    [] Aggrenox (Hold for 7 days)        [] Patient will stop medication on their own.    [] Blood Thinner Form Faxed for approval to hold.    Provider form faxed to:     Assessment Completed by:  Electronically signed by Dona Dancer, RN on 3/22/2022 at 11:56 AM

## 2022-03-23 ENCOUNTER — HOSPITAL ENCOUNTER (OUTPATIENT)
Dept: CT IMAGING | Facility: HOSPITAL | Age: 57
Discharge: HOME | End: 2022-03-23
Admitting: INTERNAL MEDICINE

## 2022-03-23 DIAGNOSIS — R10.11 RUQ ABDOMINAL PAIN: ICD-10-CM

## 2022-03-23 LAB — CREAT BLDA-MCNC: 0.8 MG/DL (ref 0.6–1.3)

## 2022-03-23 PROCEDURE — 82565 ASSAY OF CREATININE: CPT

## 2022-03-23 PROCEDURE — 74177 CT ABD & PELVIS W/CONTRAST: CPT

## 2022-03-23 PROCEDURE — 25010000002 IOPAMIDOL 61 % SOLUTION: Performed by: INTERNAL MEDICINE

## 2022-03-23 RX ADMIN — IOPAMIDOL 100 ML: 612 INJECTION, SOLUTION INTRAVENOUS at 12:58

## 2022-03-29 ENCOUNTER — APPOINTMENT (OUTPATIENT)
Dept: CT IMAGING | Facility: HOSPITAL | Age: 57
End: 2022-03-29

## 2022-04-03 PROBLEM — Z02.89 PAIN MEDICATION AGREEMENT: Status: ACTIVE | Noted: 2022-04-03

## 2022-04-03 PROBLEM — M47.24 THORACIC RADICULOPATHY DUE TO OSTEOARTHRITIS OF SPINE: Status: ACTIVE | Noted: 2022-04-03

## 2022-04-03 PROBLEM — M62.830 MUSCLE SPASM OF BACK: Status: ACTIVE | Noted: 2022-04-03

## 2022-04-03 ASSESSMENT — ENCOUNTER SYMPTOMS: CONSTIPATION: 0

## 2022-05-12 DIAGNOSIS — Z01.818 PRE-OPERATIVE EXAMINATION: Primary | ICD-10-CM

## 2022-05-23 ENCOUNTER — HOSPITAL ENCOUNTER (OUTPATIENT)
Dept: PAIN MANAGEMENT | Age: 57
Discharge: HOME | End: 2022-05-23
Payer: MEDICAID

## 2022-05-23 VITALS
RESPIRATION RATE: 16 BRPM | WEIGHT: 119 LBS | TEMPERATURE: 98 F | HEART RATE: 72 BPM | SYSTOLIC BLOOD PRESSURE: 135 MMHG | DIASTOLIC BLOOD PRESSURE: 82 MMHG | OXYGEN SATURATION: 100 % | BODY MASS INDEX: 21.09 KG/M2 | HEIGHT: 63 IN

## 2022-05-23 DIAGNOSIS — G89.29 CHRONIC BILATERAL LOW BACK PAIN WITHOUT SCIATICA: ICD-10-CM

## 2022-05-23 DIAGNOSIS — M54.50 CHRONIC BILATERAL LOW BACK PAIN WITHOUT SCIATICA: ICD-10-CM

## 2022-05-23 DIAGNOSIS — M62.830 MUSCLE SPASM OF BACK: Primary | ICD-10-CM

## 2022-05-23 PROCEDURE — 99214 OFFICE O/P EST MOD 30 MIN: CPT | Performed by: NURSE PRACTITIONER

## 2022-05-23 PROCEDURE — 99213 OFFICE O/P EST LOW 20 MIN: CPT

## 2022-05-23 RX ORDER — TRAMADOL HYDROCHLORIDE 50 MG/1
50 TABLET ORAL EVERY 8 HOURS PRN
Qty: 90 TABLET | Refills: 2 | Status: SHIPPED | OUTPATIENT
Start: 2022-05-23 | End: 2022-08-21

## 2022-05-23 RX ORDER — CYCLOBENZAPRINE HCL 5 MG
5 TABLET ORAL 2 TIMES DAILY
COMMUNITY
Start: 2022-05-15 | End: 2022-05-23 | Stop reason: ALTCHOICE

## 2022-05-23 RX ORDER — METHOCARBAMOL 750 MG/1
750 TABLET, FILM COATED ORAL 3 TIMES DAILY
Qty: 90 TABLET | Refills: 2 | Status: SHIPPED | OUTPATIENT
Start: 2022-05-23 | End: 2022-06-15 | Stop reason: SDUPTHER

## 2022-05-23 ASSESSMENT — PAIN DESCRIPTION - PAIN TYPE: TYPE: CHRONIC PAIN

## 2022-05-23 ASSESSMENT — ENCOUNTER SYMPTOMS
BOWEL INCONTINENCE: 0
CONSTIPATION: 0
BACK PAIN: 1

## 2022-05-23 ASSESSMENT — PAIN DESCRIPTION - ORIENTATION: ORIENTATION: LOWER

## 2022-05-23 ASSESSMENT — PAIN DESCRIPTION - LOCATION: LOCATION: BACK

## 2022-05-23 ASSESSMENT — PAIN SCALES - GENERAL: PAINLEVEL_OUTOF10: 5

## 2022-05-23 NOTE — PROGRESS NOTES
Clinic Documentation      Education Provided:  [x] Review of Esperanza Bolaños  [] Agreement Review  [x] PEG Score Calculated [] PHQ Score Calculated [] ORT Score Calculated    [] Compliance Issues Discussed [] Cognitive Behavior Needs [x] Exercise [] Review of Test [] Financial Issues  [x] Tobacco/Alcohol Use Reviewed [x] Teaching [] New Patient [] Picture Obtained    Physician Plan:  [] Outgoing Referral  [] Pharmacy Consult  [] Test Ordered [x] Prescription Ordered/Changed   [] Obtained Test Results / Consult Notes        Complete if patient is withholding blood thinner for procedure     Blood Thinner Patient is currently taking:      [] Plavix (Hold for 7 days)  [] Aspirin (Hold for 5 days)     [] Pletal (Hold for 2 days)  [] Pradaxa (Hold for 3 days)    [] Effient (Hold for 7 days)  [] Xarelto (Hold for 2 days)    [] Eliquis (Hold for 2 days)  [] Brilinta (Hold for 7 days)    [] Coumadin (Hold for 5 days) - (INR needs to be drawn the day prior to procedure- INR < 2.0)    [] Aggrenox (Hold for 7 days)        [] Patient will stop medication on their own.    [] Blood Thinner Form Faxed for approval to hold.    Provider form faxed to:     Assessment Completed by:  Electronically signed by Navi Funes RN on 5/23/2022 at 4:06 PM
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
## 2022-06-06 ENCOUNTER — TELEPHONE (OUTPATIENT)
Dept: PAIN MANAGEMENT | Age: 57
End: 2022-06-06

## 2022-06-06 NOTE — TELEPHONE ENCOUNTER
Patient left a message about having complications with one of her medications. I called her back & she saw Susan Limb 5/23/22 and was placed on Robaxin she cannot tell that it is doing anything. Susan Limb had mention Flexeril 10 mg, so patient would like to be taken off Robaxin & placed on Flexeril instead if possible. I told her she had been taking Robaxin for only 2 weeks it takes longer to get in your system, but would rather try Flexeril.

## 2022-06-08 ENCOUNTER — OFFICE VISIT (OUTPATIENT)
Dept: NEUROSURGERY | Age: 57
End: 2022-06-08
Payer: MEDICAID

## 2022-06-08 VITALS
DIASTOLIC BLOOD PRESSURE: 64 MMHG | BODY MASS INDEX: 21.09 KG/M2 | OXYGEN SATURATION: 98 % | WEIGHT: 119 LBS | HEIGHT: 63 IN | HEART RATE: 77 BPM | SYSTOLIC BLOOD PRESSURE: 124 MMHG

## 2022-06-08 DIAGNOSIS — M54.41 CHRONIC BILATERAL LOW BACK PAIN WITH BILATERAL SCIATICA: ICD-10-CM

## 2022-06-08 DIAGNOSIS — M51.36 DDD (DEGENERATIVE DISC DISEASE), LUMBAR: ICD-10-CM

## 2022-06-08 DIAGNOSIS — M54.2 NECK PAIN: ICD-10-CM

## 2022-06-08 DIAGNOSIS — M51.34 DDD (DEGENERATIVE DISC DISEASE), THORACIC: ICD-10-CM

## 2022-06-08 DIAGNOSIS — M54.42 CHRONIC BILATERAL LOW BACK PAIN WITH BILATERAL SCIATICA: ICD-10-CM

## 2022-06-08 DIAGNOSIS — R20.2 PARESTHESIA: Primary | ICD-10-CM

## 2022-06-08 DIAGNOSIS — G89.29 CHRONIC BILATERAL LOW BACK PAIN WITH BILATERAL SCIATICA: ICD-10-CM

## 2022-06-08 PROCEDURE — 99214 OFFICE O/P EST MOD 30 MIN: CPT | Performed by: PSYCHIATRY & NEUROLOGY

## 2022-06-08 NOTE — TELEPHONE ENCOUNTER
Please have patient increase her Robaxin to two tablets at a time. Have her take that for another two weeks. If she does not improve, she will be switched back to flexeril. Remind her that she has to take medication at least twice a day every day for this medication to work. It has to get into the system and stay in the system.  Thanks
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
## 2022-06-15 DIAGNOSIS — M62.830 MUSCLE SPASM OF BACK: ICD-10-CM

## 2022-06-20 RX ORDER — METHOCARBAMOL 750 MG/1
1500 TABLET, FILM COATED ORAL 3 TIMES DAILY
Qty: 180 TABLET | Refills: 1 | Status: SHIPPED | OUTPATIENT
Start: 2022-06-20 | End: 2022-08-19

## 2022-07-11 DIAGNOSIS — Z79.891 LONG TERM CURRENT USE OF OPIATE ANALGESIC: Primary | ICD-10-CM

## 2022-07-11 RX ORDER — NALOXONE HYDROCHLORIDE 4 MG/.1ML
1 SPRAY NASAL PRN
Qty: 1 EACH | Refills: 2 | Status: SHIPPED | OUTPATIENT
Start: 2022-07-11

## 2022-07-12 ENCOUNTER — TELEPHONE (OUTPATIENT)
Dept: PAIN MANAGEMENT | Age: 57
End: 2022-07-12

## 2022-07-20 DIAGNOSIS — Z01.818 PRE-OPERATIVE EXAMINATION: ICD-10-CM

## 2022-07-20 DIAGNOSIS — J38.1 POLYP OF VOCAL CORD: Primary | ICD-10-CM

## 2022-07-26 ENCOUNTER — TELEPHONE (OUTPATIENT)
Dept: OTOLARYNGOLOGY | Facility: CLINIC | Age: 57
End: 2022-07-26

## 2022-07-26 NOTE — TELEPHONE ENCOUNTER
Caller: CORINNA ARCOS     Relationship: SELF    FYI - PT CALLED TO CANCEL SAME DAY APPT AT 2:15.

## 2022-08-11 ENCOUNTER — TELEPHONE (OUTPATIENT)
Dept: PAIN MANAGEMENT | Age: 57
End: 2022-08-11

## 2022-08-23 ENCOUNTER — HOSPITAL ENCOUNTER (OUTPATIENT)
Dept: PAIN MANAGEMENT | Age: 57
Discharge: HOME | End: 2022-08-23
Payer: MEDICAID

## 2022-08-23 VITALS
RESPIRATION RATE: 16 BRPM | WEIGHT: 113 LBS | OXYGEN SATURATION: 98 % | HEIGHT: 62 IN | BODY MASS INDEX: 20.8 KG/M2 | DIASTOLIC BLOOD PRESSURE: 76 MMHG | HEART RATE: 58 BPM | TEMPERATURE: 97.9 F | SYSTOLIC BLOOD PRESSURE: 130 MMHG

## 2022-08-23 DIAGNOSIS — M62.830 MUSCLE SPASM OF BACK: ICD-10-CM

## 2022-08-23 DIAGNOSIS — M45.9 RHEUMATOID ARTHRITIS INVOLVING VERTEBRA WITH POSITIVE RHEUMATOID FACTOR (HCC): Primary | ICD-10-CM

## 2022-08-23 PROCEDURE — 99213 OFFICE O/P EST LOW 20 MIN: CPT

## 2022-08-23 PROCEDURE — 99214 OFFICE O/P EST MOD 30 MIN: CPT | Performed by: NURSE PRACTITIONER

## 2022-08-23 RX ORDER — TRAMADOL HYDROCHLORIDE 50 MG/1
50 TABLET ORAL EVERY 8 HOURS PRN
COMMUNITY
End: 2022-08-23 | Stop reason: ALTCHOICE

## 2022-08-23 RX ORDER — TRAMADOL HYDROCHLORIDE 100 MG/1
100 TABLET, COATED ORAL 3 TIMES DAILY PRN
Qty: 90 TABLET | Refills: 2 | Status: SHIPPED | OUTPATIENT
Start: 2022-08-23

## 2022-08-23 RX ORDER — METHOCARBAMOL 750 MG/1
1500 TABLET, FILM COATED ORAL 3 TIMES DAILY
Qty: 180 TABLET | Refills: 2 | Status: SHIPPED | OUTPATIENT
Start: 2022-08-23 | End: 2022-11-21

## 2022-08-23 RX ORDER — METHOCARBAMOL 750 MG/1
1500 TABLET, FILM COATED ORAL 3 TIMES DAILY
COMMUNITY
End: 2022-08-23 | Stop reason: SDUPTHER

## 2022-08-23 ASSESSMENT — PAIN SCALES - GENERAL: PAINLEVEL_OUTOF10: 6

## 2022-08-23 ASSESSMENT — ENCOUNTER SYMPTOMS
BOWEL INCONTINENCE: 0
CONSTIPATION: 0
BACK PAIN: 1

## 2022-08-23 ASSESSMENT — PAIN DESCRIPTION - LOCATION: LOCATION: BACK

## 2022-08-23 ASSESSMENT — PAIN DESCRIPTION - ORIENTATION: ORIENTATION: LOWER

## 2022-08-23 ASSESSMENT — PAIN DESCRIPTION - PAIN TYPE: TYPE: CHRONIC PAIN

## 2022-08-23 NOTE — PROGRESS NOTES
Clinic Documentation      Education Provided:  [x] Review of Deitra Bound  [] Agreement Review  [x] PEG Score Calculated [] PHQ Score Calculated [] ORT Score Calculated    [] Compliance Issues Discussed [] Cognitive Behavior Needs [x] Exercise [] Review of Test [] Financial Issues  [x] Tobacco/Alcohol Use Reviewed [x] Teaching [] New Patient [] Picture Obtained    Physician Plan:  [] Outgoing Referral  [] Pharmacy Consult  [] Test Ordered [x] Prescription Ordered/Changed   [] Obtained Test Results / Consult Notes        Complete if patient is withholding blood thinner for procedure     Blood Thinner Patient is currently taking:      [] Plavix (Hold for 7 days)  [] Aspirin (Hold for 5 days)     [] Pletal (Hold for 2 days)  [] Pradaxa (Hold for 3 days)    [] Effient (Hold for 7 days)  [] Xarelto (Hold for 2 days)    [] Eliquis (Hold for 2 days)  [] Brilinta (Hold for 7 days)    [] Coumadin (Hold for 5 days) - (INR needs to be drawn the day prior to procedure- INR < 2.0)    [] Aggrenox (Hold for 7 days)        [] Patient will stop medication on their own.    [] Blood Thinner Form Faxed for approval to hold.    Provider form faxed to:     Assessment Completed by:  Electronically signed by Gilbert Hogan RN on 8/23/2022 at 4:20 PM
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
## 2022-11-01 ENCOUNTER — TRANSCRIBE ORDERS (OUTPATIENT)
Dept: ADMINISTRATIVE | Facility: HOSPITAL | Age: 57
End: 2022-11-01

## 2022-11-01 DIAGNOSIS — R91.1 LUNG NODULE: Primary | ICD-10-CM

## 2022-11-04 ENCOUNTER — APPOINTMENT (OUTPATIENT)
Dept: CT IMAGING | Facility: HOSPITAL | Age: 57
End: 2022-11-04

## 2022-11-11 ENCOUNTER — HOSPITAL ENCOUNTER (OUTPATIENT)
Dept: CT IMAGING | Facility: HOSPITAL | Age: 57
End: 2022-11-11

## 2022-11-14 DIAGNOSIS — M45.9 RHEUMATOID ARTHRITIS INVOLVING VERTEBRA WITH POSITIVE RHEUMATOID FACTOR (HCC): ICD-10-CM

## 2022-11-14 DIAGNOSIS — M62.830 MUSCLE SPASM OF BACK: ICD-10-CM

## 2022-11-15 RX ORDER — METHOCARBAMOL 750 MG/1
1500 TABLET, FILM COATED ORAL 3 TIMES DAILY
Qty: 180 TABLET | Refills: 2 | Status: SHIPPED | OUTPATIENT
Start: 2022-11-15 | End: 2023-02-13

## 2022-11-15 RX ORDER — TRAMADOL HYDROCHLORIDE 100 MG/1
100 TABLET, COATED ORAL 3 TIMES DAILY PRN
Qty: 90 TABLET | Refills: 0 | Status: SHIPPED | OUTPATIENT
Start: 2022-11-21

## 2023-01-05 ENCOUNTER — TELEPHONE (OUTPATIENT)
Dept: PAIN MANAGEMENT | Age: 58
End: 2023-01-05

## 2023-01-05 DIAGNOSIS — M45.9 RHEUMATOID ARTHRITIS INVOLVING VERTEBRA WITH POSITIVE RHEUMATOID FACTOR (HCC): ICD-10-CM

## 2023-01-06 RX ORDER — TRAMADOL HYDROCHLORIDE 100 MG/1
100 TABLET, COATED ORAL 3 TIMES DAILY PRN
Qty: 90 TABLET | Refills: 0 | Status: SHIPPED | OUTPATIENT
Start: 2023-01-09

## 2023-01-09 ENCOUNTER — TELEPHONE (OUTPATIENT)
Dept: PAIN MANAGEMENT | Age: 58
End: 2023-01-09

## 2023-01-09 NOTE — TELEPHONE ENCOUNTER
Returning call to patient to let her know that [facility] has sent a script for her medication,and can be filled today. Patient did not answer, and voicemail box is full so I could not leave a message.

## 2023-02-06 DIAGNOSIS — M62.830 MUSCLE SPASM OF BACK: ICD-10-CM

## 2023-02-06 DIAGNOSIS — M45.9 RHEUMATOID ARTHRITIS INVOLVING VERTEBRA WITH POSITIVE RHEUMATOID FACTOR (HCC): ICD-10-CM

## 2023-02-06 RX ORDER — METHOCARBAMOL 750 MG/1
1500 TABLET, FILM COATED ORAL 3 TIMES DAILY
Qty: 180 TABLET | Refills: 2 | Status: SHIPPED | OUTPATIENT
Start: 2023-02-13 | End: 2023-05-14

## 2023-02-06 RX ORDER — TRAMADOL HYDROCHLORIDE 100 MG/1
100 TABLET, COATED ORAL 3 TIMES DAILY PRN
Qty: 90 TABLET | Refills: 0 | Status: SHIPPED | OUTPATIENT
Start: 2023-02-08

## 2023-02-06 NOTE — TELEPHONE ENCOUNTER
Patient called on 2/3/23. The office is closed on Fridays. Patient cancelled appt on 11/28/22. Rescheduled for 2/21/23 with [place].
no

## 2023-02-21 ENCOUNTER — HOSPITAL ENCOUNTER (OUTPATIENT)
Dept: PAIN MANAGEMENT | Age: 58
Discharge: HOME | End: 2023-02-21
Payer: MEDICAID

## 2023-02-21 VITALS
HEIGHT: 62 IN | SYSTOLIC BLOOD PRESSURE: 159 MMHG | HEART RATE: 64 BPM | OXYGEN SATURATION: 99 % | DIASTOLIC BLOOD PRESSURE: 82 MMHG | WEIGHT: 106 LBS | BODY MASS INDEX: 19.51 KG/M2 | TEMPERATURE: 97.7 F | RESPIRATION RATE: 16 BRPM

## 2023-02-21 DIAGNOSIS — M45.9 RHEUMATOID ARTHRITIS INVOLVING VERTEBRA WITH POSITIVE RHEUMATOID FACTOR (HCC): ICD-10-CM

## 2023-02-21 DIAGNOSIS — M62.830 MUSCLE SPASM OF BACK: ICD-10-CM

## 2023-02-21 PROCEDURE — 99215 OFFICE O/P EST HI 40 MIN: CPT

## 2023-02-21 RX ORDER — TRAMADOL HYDROCHLORIDE 100 MG/1
100 TABLET, COATED ORAL EVERY 6 HOURS PRN
Qty: 120 TABLET | Refills: 2 | Status: SHIPPED | OUTPATIENT
Start: 2023-03-02

## 2023-02-21 RX ORDER — METHOCARBAMOL 750 MG/1
1500 TABLET, FILM COATED ORAL 4 TIMES DAILY
Qty: 240 TABLET | Refills: 2 | Status: SHIPPED | OUTPATIENT
Start: 2023-02-21 | End: 2023-05-22

## 2023-02-21 RX ORDER — ACETAMINOPHEN 500 MG
500 TABLET ORAL EVERY 6 HOURS PRN
COMMUNITY

## 2023-02-21 ASSESSMENT — PAIN DESCRIPTION - LOCATION: LOCATION: BACK

## 2023-02-21 ASSESSMENT — PAIN DESCRIPTION - ORIENTATION: ORIENTATION: LOWER

## 2023-02-21 ASSESSMENT — ENCOUNTER SYMPTOMS
CONSTIPATION: 0
BOWEL INCONTINENCE: 0

## 2023-02-21 ASSESSMENT — PAIN SCALES - GENERAL: PAINLEVEL_OUTOF10: 5

## 2023-02-21 ASSESSMENT — PAIN DESCRIPTION - PAIN TYPE: TYPE: CHRONIC PAIN

## 2023-02-21 NOTE — PROGRESS NOTES
Pain agreement reviewed with patient and signed per patient. No questions voiced at the present and patient states understanding of agreement. Copy given to patient.
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
## 2023-02-21 NOTE — TELEPHONE ENCOUNTER
1. Muscle spasm of back  - methocarbamol (ROBAXIN) 750 MG tablet; Take 2 tablets by mouth 4 times daily  Dispense: 240 tablet; Refill: 2    2. 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

## 2023-02-21 NOTE — PROGRESS NOTES
Clinic Documentation      Education Provided:  [x] Review of Kingia Luis  [x] Agreement Review  [x] PEG Score Calculated [x] PHQ Score Calculated [x] ORT Score Calculated    [] Compliance Issues Discussed [] Cognitive Behavior Needs [x] Exercise [] Review of Test [] Financial Issues  [x] Tobacco/Alcohol Use Reviewed [x] Teaching [] New Patient [] Picture Obtained    Physician Plan:  [] Outgoing Referral  [] Pharmacy Consult  [] Test Ordered [x] Prescription Ordered/Changed   [] Obtained Test Results / Consult Notes        Complete if patient is withholding blood thinner for procedure     Blood Thinner Patient is currently taking:      [] Plavix (Hold for 7 days)  [] Aspirin (Hold for 5 days)     [] Pletal (Hold for 2 days)  [] Pradaxa (Hold for 3 days)    [] Effient (Hold for 7 days)  [] Xarelto (Hold for 2 days)    [] Eliquis (Hold for 2 days)  [] Brilinta (Hold for 7 days)    [] Coumadin (Hold for 5 days) - (INR needs to be drawn the day prior to procedure- INR < 2.0)    [] Aggrenox (Hold for 7 days)        [] Patient will stop medication on their own.    [] Blood Thinner Form Faxed for approval to hold.    Provider form faxed to:     Assessment Completed by:  Electronically signed by Michael See RN on 2/21/2023 at 4:06 PM

## 2023-03-26 PROBLEM — Z02.89 PAIN MEDICATION AGREEMENT SIGNED: Status: ACTIVE | Noted: 2023-03-26

## 2023-03-26 ASSESSMENT — ENCOUNTER SYMPTOMS: BACK PAIN: 1

## 2023-05-04 NOTE — TELEPHONE ENCOUNTER
Patient called regarding her tramadol script. Patient has been caring for her mother, who passed away this morning. She said that she picked up her script for Tramadol in December and a few days after she had the script, discovered that the medication had been filled by Dr. Kaitlin Berry (PCP) and not [facility]. She stated that she called Dr. Kelsey Batista office and they had received a refill request from her for other medications. (Patient also takes atenolol). Patient's script for tramadol was filled by Dr. Kelsey Batista office inadvertently. Script was for #60 tablets. Patient is calling to try to sort out with provider and also because she will be running out of the medication sooner than her usual 21st fill date. I have spoken with prescription staff and they will send request to [facility] for refill. I am also messaging [facility] about the issue. [de-identified] : The patient and their family member(s) were advised of the diagnosis. The natural history of the pathology was explained in full to the patient and the family in layman's terms. \par Here is the plan that we have set forth today.\par 1. at this point it seems to be more of a contusion\par 2. ice and pad te area but i am ok with you going back o play\par 3. try to avoid any further contact with that area.  If pain returns come back to the office for repeat eval and consideration for further imaging.\par 4. ATC pal was also updated.\par The patient and the family understands the plan of care as described above.  All questions have been answered.\par Thank you for allowing me to care for YESI.\par Sincerely,\par Jackie Young, DO, FAAP, CAQ-SM\par Sports Medicine\par \par \par

## 2023-05-22 ENCOUNTER — HOSPITAL ENCOUNTER (OUTPATIENT)
Dept: PAIN MANAGEMENT | Age: 58
Discharge: HOME | End: 2023-05-22

## 2023-05-22 VITALS
RESPIRATION RATE: 16 BRPM | SYSTOLIC BLOOD PRESSURE: 142 MMHG | DIASTOLIC BLOOD PRESSURE: 83 MMHG | WEIGHT: 105 LBS | TEMPERATURE: 98 F | HEART RATE: 116 BPM | BODY MASS INDEX: 19.32 KG/M2 | HEIGHT: 62 IN | OXYGEN SATURATION: 92 %

## 2023-05-22 DIAGNOSIS — Z51.81 ENCOUNTER FOR MONITORING OPIOID MAINTENANCE THERAPY: ICD-10-CM

## 2023-05-22 DIAGNOSIS — Z79.891 ENCOUNTER FOR MONITORING OPIOID MAINTENANCE THERAPY: ICD-10-CM

## 2023-05-22 DIAGNOSIS — M45.9 RHEUMATOID ARTHRITIS INVOLVING VERTEBRA WITH POSITIVE RHEUMATOID FACTOR (HCC): ICD-10-CM

## 2023-05-22 PROCEDURE — 99214 OFFICE O/P EST MOD 30 MIN: CPT

## 2023-05-22 PROCEDURE — 99214 OFFICE O/P EST MOD 30 MIN: CPT | Performed by: NURSE PRACTITIONER

## 2023-05-22 RX ORDER — TRAMADOL HYDROCHLORIDE 100 MG/1
100 TABLET, COATED ORAL EVERY 6 HOURS PRN
Qty: 120 TABLET | Refills: 2 | Status: SHIPPED | OUTPATIENT
Start: 2023-06-02

## 2023-05-22 ASSESSMENT — ENCOUNTER SYMPTOMS
BACK PAIN: 1
CONSTIPATION: 0
BOWEL INCONTINENCE: 0

## 2023-05-22 ASSESSMENT — PAIN DESCRIPTION - LOCATION: LOCATION: BACK

## 2023-05-22 ASSESSMENT — PAIN DESCRIPTION - PAIN TYPE: TYPE: CHRONIC PAIN

## 2023-05-22 ASSESSMENT — PAIN SCALES - GENERAL: PAINLEVEL_OUTOF10: 5

## 2023-05-22 ASSESSMENT — PAIN DESCRIPTION - ORIENTATION: ORIENTATION: LOWER

## 2023-05-22 NOTE — PROGRESS NOTES
Clinic Documentation      Education Provided:  [x] Review of Humble Guy  [] Agreement Review  [x] PEG Score Calculated [] PHQ Score Calculated [] ORT Score Calculated    [] Compliance Issues Discussed [] Cognitive Behavior Needs [x] Exercise [] Review of Test [] Financial Issues  [x] Tobacco/Alcohol Use Reviewed [x] Teaching [] New Patient [] Picture Obtained    Physician Plan:  [] Outgoing Referral  [] Pharmacy Consult  [x] Test Ordered [x] Prescription Ordered/Changed   [] Obtained Test Results / Consult Notes        Complete if patient is withholding blood thinner for procedure     Blood Thinner Patient is currently taking:      [] Plavix (Hold for 7 days)  [] Aspirin (Hold for 5 days)     [] Pletal (Hold for 2 days)  [] Pradaxa (Hold for 3 days)    [] Effient (Hold for 7 days)  [] Xarelto (Hold for 2 days)    [] Eliquis (Hold for 2 days)  [] Brilinta (Hold for 7 days)    [] Coumadin (Hold for 5 days) - (INR needs to be drawn the day prior to procedure- INR < 2.0)    [] Aggrenox (Hold for 7 days)        [] Patient will stop medication on their own.    [] Blood Thinner Form Faxed for approval to hold.    Provider form faxed to:     Assessment Completed by:  Electronically signed by Jaiden Ceja RN on 5/22/2023 at 1:41 PM

## 2023-05-22 NOTE — TELEPHONE ENCOUNTER
1. 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

## 2023-05-22 NOTE — PROGRESS NOTES
[facility] Physical & Pain Medicine    Office Visit    Patient Name: Tere Dowd    MR #: [id number]    Account [de-identified]    : 1965    Age: 62 y.o. Sex: female    Date: 2023    PCP: Geoff Mullen DO    Chief Complaint:   Chief Complaint   Patient presents with    Back Pain     5/10       History of Present Illness: The patient is a 62 y.o. female who presents for 3 month follow up. Patient states that medication changes were effective. Back Pain  This is a chronic problem. The current episode started more than 1 year ago. The problem occurs constantly. The problem has been waxing and waning since onset. The pain is present in the lumbar spine and thoracic spine. The quality of the pain is described as aching. Radiates to: Thoracic under shoulder blade. The symptoms are aggravated by standing and sitting (breathing). Associated symptoms include leg pain (back of legs), paresthesias and weakness. Pertinent negatives include no bladder incontinence, bowel incontinence or numbness. Past Imaging  Patient EMG/NVC on 10/1/2022 indicates mild CTS bilaterally. MRI of Thoracic Spine 2021 - Advanced facet   arthropathy with neuroforaminal narrowing on the right at T3-T4, T4-T5, T5-T6    MRI lumbar Spine 10/18/2021 - Multilevel degenerative change. No greater than mild central canal stenosis. Moderate RIGHT neural foraminal stenosis at L4-L5 and moderate to severe RIGHT neural foraminal stenosis at L5-S1 with nerve root abutment and displacement.  Correlate with RIGHT L4 and L5   radiculopathies    Screening Tools:     PE.7    Past PE.3    ORT: 1    PHQ-9: 6    Current Pain Assessment  Pain Assessment  Pain Assessment: 0-10  Pain Level: 5  Pain Location: Back  Pain Orientation: Lower  Pain Type: Chronic pain    Past Visit HPI:  3/2023  Patient presently getting 100 mg TID however she states that there are times that the pain medication does not seem to last

## 2023-05-30 DIAGNOSIS — M62.830 MUSCLE SPASM OF BACK: ICD-10-CM

## 2023-05-30 RX ORDER — METHOCARBAMOL 750 MG/1
1500 TABLET, FILM COATED ORAL 4 TIMES DAILY
Qty: 240 TABLET | Refills: 2 | Status: SHIPPED | OUTPATIENT
Start: 2023-05-30 | End: 2023-08-28

## 2023-08-18 DIAGNOSIS — M62.830 MUSCLE SPASM OF BACK: ICD-10-CM

## 2023-08-22 ENCOUNTER — HOSPITAL ENCOUNTER (OUTPATIENT)
Dept: PAIN MANAGEMENT | Age: 58
Discharge: HOME | End: 2023-08-22

## 2023-08-22 VITALS
DIASTOLIC BLOOD PRESSURE: 85 MMHG | RESPIRATION RATE: 16 BRPM | TEMPERATURE: 97.5 F | BODY MASS INDEX: 18.95 KG/M2 | SYSTOLIC BLOOD PRESSURE: 140 MMHG | WEIGHT: 103 LBS | HEIGHT: 62 IN | HEART RATE: 61 BPM | OXYGEN SATURATION: 98 %

## 2023-08-22 DIAGNOSIS — M54.50 CHRONIC BILATERAL LOW BACK PAIN WITHOUT SCIATICA: Primary | ICD-10-CM

## 2023-08-22 DIAGNOSIS — G89.29 CHRONIC BILATERAL LOW BACK PAIN WITHOUT SCIATICA: Primary | ICD-10-CM

## 2023-08-22 DIAGNOSIS — Z79.891 LONG TERM CURRENT USE OF OPIATE ANALGESIC: ICD-10-CM

## 2023-08-22 DIAGNOSIS — Z02.89 PAIN MEDICATION AGREEMENT: ICD-10-CM

## 2023-08-22 DIAGNOSIS — M45.9 RHEUMATOID ARTHRITIS INVOLVING VERTEBRA WITH POSITIVE RHEUMATOID FACTOR (HCC): ICD-10-CM

## 2023-08-22 DIAGNOSIS — M47.24 THORACIC RADICULOPATHY DUE TO OSTEOARTHRITIS OF SPINE: ICD-10-CM

## 2023-08-22 PROCEDURE — 99213 OFFICE O/P EST LOW 20 MIN: CPT

## 2023-08-22 RX ORDER — DIPHENHYDRAMINE HCL 25 MG
25 CAPSULE ORAL NIGHTLY PRN
COMMUNITY

## 2023-08-22 ASSESSMENT — ENCOUNTER SYMPTOMS
CONSTIPATION: 0
BOWEL INCONTINENCE: 0

## 2023-08-22 ASSESSMENT — PAIN SCALES - GENERAL: PAINLEVEL_OUTOF10: 6

## 2023-08-22 ASSESSMENT — PAIN DESCRIPTION - LOCATION: LOCATION: BACK

## 2023-08-22 ASSESSMENT — PAIN DESCRIPTION - ORIENTATION: ORIENTATION: LOWER

## 2023-08-22 ASSESSMENT — PAIN DESCRIPTION - PAIN TYPE: TYPE: CHRONIC PAIN

## 2023-08-22 NOTE — PROGRESS NOTES
Clinic Documentation      Education Provided:  [x] Review of Marvene Seen  [] Agreement Review  [x] PEG Score Calculated [] PHQ Score Calculated [] ORT Score Calculated    [] Compliance Issues Discussed [] Cognitive Behavior Needs [x] Exercise [] Review of Test [] Financial Issues  [x] Tobacco/Alcohol Use Reviewed [x] Teaching [] New Patient [] Picture Obtained    Physician Plan:  [] Outgoing Referral  [] Pharmacy Consult  [] Test Ordered [x] Prescription Ordered/Changed   [] Obtained Test Results / Consult Notes        Complete if patient is withholding blood thinner for procedure     Blood Thinner Patient is currently taking:      [] Plavix (Hold for 7 days)  [] Aspirin (Hold for 5 days)     [] Pletal (Hold for 2 days)  [] Pradaxa (Hold for 3 days)    [] Effient (Hold for 7 days)  [] Xarelto (Hold for 2 days)    [] Eliquis (Hold for 2 days)  [] Brilinta (Hold for 7 days)    [] Coumadin (Hold for 5 days) - (INR needs to be drawn the day prior to procedure- INR < 2.0)    [] Aggrenox (Hold for 7 days)        [] Patient will stop medication on their own.    [] Blood Thinner Form Faxed for approval to hold.    Provider form faxed to:     Assessment Completed by:  Electronically signed by Villa Whitehead RN on 8/22/2023 at 1:18 PM
mg by mouth every 6 hours as needed (pain) Max Daily Amount: 400 mg  Dispense: 120 tablet; Refill: 2     Continue medication with refill sent at appointment yes; refill sent to medical director at appointment NA, see refill encounter dated 5/22/2023      Electronically signed by ALESSANDRA Lieberman CNP on 5/22/2023 at 2:17 PM     Pain medication agreement signed  Medication agreement reviewed and signed 2/21/2023    Encounter for Opioid Maintenance Monitoring  Patient is tested according to risk of opioid misuse, office policy, state regulations and/or providers discretion  Patient risk low  UDS or Saliva collected & tested on 5/22/2023     [x] Follow up    [] 4 weeks   [] 6-8 weeks   [] 10-12 weeks   [x] 3 months  [] Post procedure to evaluate effectiveness of treatment  [] To evaluate medications changes made at office visit. [] To review diagnostics ordered at last visit. [] To evaluate response to therapy    [x] For management of controlled substance  [x] Random UDS as indicated by ORT score or if indicated by abberent behaviors    Discussion: Discussed exam findings and plan of care with patient. Patient agreed with POC and questions were asked and answered. Activity: discussed exercise as beneficial to pain reduction, encouraged stretching exercise at least twice daily with a focus on torso (core) strengthening. Goal:  Pain Management Goals of Therapy:   [] Resolution in pain  [x] Decrease in pain level  [x] Improvement in ADL's  [x] Increase in activities with less pain  [] Decrease in medication     Controlled substance monitoring:    [x] Discussed medication side effects, risk of tolerance and/or dependence, and/or alternative treatment  [] Discussed the detrimental effects of long term narcotic use in younger patients especially women of childbearing years.   [x] No signs and symptoms of potential drug abuse or diversion were identified  [] Signs of potential drug abuse or diversion were

## 2023-08-23 DIAGNOSIS — M62.830 MUSCLE SPASM OF BACK: ICD-10-CM

## 2023-08-23 RX ORDER — METHOCARBAMOL 750 MG/1
1500 TABLET, FILM COATED ORAL 4 TIMES DAILY
Qty: 240 TABLET | Refills: 2 | OUTPATIENT
Start: 2023-08-23 | End: 2023-11-21

## 2023-08-23 NOTE — TELEPHONE ENCOUNTER
Patient thought script for her robaxin was sent yesterday at her appointment but her pharmacy did not have a new script for her. I called a refill in for her and left it on the pharmacy's voicemail.

## 2023-08-28 DIAGNOSIS — M45.9 RHEUMATOID ARTHRITIS INVOLVING VERTEBRA WITH POSITIVE RHEUMATOID FACTOR (HCC): ICD-10-CM

## 2023-08-28 RX ORDER — TRAMADOL HYDROCHLORIDE 100 MG/1
100 TABLET, COATED ORAL EVERY 6 HOURS PRN
Qty: 120 TABLET | Refills: 2 | Status: SHIPPED | OUTPATIENT
Start: 2023-08-28

## 2023-09-21 ASSESSMENT — ENCOUNTER SYMPTOMS: BACK PAIN: 1

## 2023-10-04 RX ORDER — METHOCARBAMOL 750 MG/1
TABLET, FILM COATED ORAL
Qty: 180 TABLET | Refills: 2 | OUTPATIENT
Start: 2023-10-04

## 2023-10-04 RX ORDER — METHOCARBAMOL 750 MG/1
1500 TABLET, FILM COATED ORAL 4 TIMES DAILY
Qty: 240 TABLET | Refills: 2 | OUTPATIENT
Start: 2023-10-04 | End: 2024-01-02

## 2023-10-04 RX ORDER — TRAMADOL HYDROCHLORIDE 100 MG/1
TABLET, COATED ORAL
Qty: 90 TABLET | Refills: 0 | OUTPATIENT
Start: 2023-10-04

## 2023-11-11 DIAGNOSIS — M62.830 MUSCLE SPASM OF BACK: ICD-10-CM

## 2023-11-11 RX ORDER — METHOCARBAMOL 750 MG/1
TABLET, FILM COATED ORAL
Qty: 240 TABLET | Refills: 2 | OUTPATIENT
Start: 2023-11-11

## 2023-11-13 DIAGNOSIS — M45.9 RHEUMATOID ARTHRITIS INVOLVING VERTEBRA WITH POSITIVE RHEUMATOID FACTOR (HCC): ICD-10-CM

## 2023-11-13 DIAGNOSIS — M62.830 MUSCLE SPASM OF BACK: ICD-10-CM

## 2023-11-13 RX ORDER — METHOCARBAMOL 750 MG/1
1500 TABLET, FILM COATED ORAL 4 TIMES DAILY
Qty: 240 TABLET | Refills: 0 | Status: SHIPPED | OUTPATIENT
Start: 2023-11-13 | End: 2024-02-11

## 2023-11-13 RX ORDER — TRAMADOL HYDROCHLORIDE 100 MG/1
100 TABLET, COATED ORAL EVERY 6 HOURS PRN
Qty: 120 TABLET | Refills: 2 | Status: SHIPPED | OUTPATIENT
Start: 2023-11-22

## 2023-11-20 ENCOUNTER — TELEPHONE (OUTPATIENT)
Dept: PAIN MANAGEMENT | Age: 58
End: 2023-11-20

## 2023-11-20 NOTE — TELEPHONE ENCOUNTER
Pt called wanting her Tramadol filled early due to leaving out of town, her fill date was 11/22/23, I called [facility] to let them know she could fill 11/20/23 but to put on her bottle it must last until 12/21/23 pts next office appt.

## 2023-12-11 DIAGNOSIS — M62.830 MUSCLE SPASM OF BACK: ICD-10-CM

## 2023-12-11 RX ORDER — METHOCARBAMOL 750 MG/1
1500 TABLET, FILM COATED ORAL 4 TIMES DAILY
Qty: 240 TABLET | Refills: 0 | Status: SHIPPED | OUTPATIENT
Start: 2023-12-11 | End: 2024-03-10

## 2024-01-18 ENCOUNTER — TELEPHONE (OUTPATIENT)
Dept: PAIN MANAGEMENT | Age: 59
End: 2024-01-18

## 2024-02-05 DIAGNOSIS — M62.830 MUSCLE SPASM OF BACK: ICD-10-CM

## 2024-02-05 RX ORDER — METHOCARBAMOL 750 MG/1
1500 TABLET, FILM COATED ORAL 4 TIMES DAILY
Qty: 240 TABLET | Refills: 2 | Status: SHIPPED | OUTPATIENT
Start: 2024-02-05 | End: 2024-05-05

## 2024-02-13 ENCOUNTER — TELEPHONE (OUTPATIENT)
Dept: PAIN MANAGEMENT | Age: 59
End: 2024-02-13

## 2024-02-13 NOTE — TELEPHONE ENCOUNTER
Pt called for refills for Tramadol, last appointment fill has not been filled yet.  Pt was made aware the pharmacy had a full script on file.  Also, pt was told her fills must last 30 days, [facility] [address] [facility] early.

## 2024-03-20 ENCOUNTER — TELEPHONE (OUTPATIENT)
Dept: PAIN MANAGEMENT | Age: 59
End: 2024-03-20

## 2024-03-25 ENCOUNTER — HOSPITAL ENCOUNTER (OUTPATIENT)
Dept: PAIN MANAGEMENT | Age: 59
Discharge: HOME | End: 2024-03-25
Payer: COMMERCIAL

## 2024-03-25 ENCOUNTER — TRANSCRIBE ORDERS (OUTPATIENT)
Dept: ADMINISTRATIVE | Facility: HOSPITAL | Age: 59
End: 2024-03-25
Payer: COMMERCIAL

## 2024-03-25 VITALS
BODY MASS INDEX: 19.51 KG/M2 | SYSTOLIC BLOOD PRESSURE: 150 MMHG | HEIGHT: 62 IN | WEIGHT: 106 LBS | RESPIRATION RATE: 16 BRPM | DIASTOLIC BLOOD PRESSURE: 80 MMHG | TEMPERATURE: 96.6 F | HEART RATE: 65 BPM | OXYGEN SATURATION: 99 %

## 2024-03-25 DIAGNOSIS — Z12.2 ENCOUNTER FOR SCREENING FOR LUNG CANCER: Primary | ICD-10-CM

## 2024-03-25 DIAGNOSIS — Z51.81 ENCOUNTER FOR MONITORING OPIOID MAINTENANCE THERAPY: ICD-10-CM

## 2024-03-25 DIAGNOSIS — M45.9 RHEUMATOID ARTHRITIS INVOLVING VERTEBRA WITH POSITIVE RHEUMATOID FACTOR (HCC): ICD-10-CM

## 2024-03-25 DIAGNOSIS — M62.830 MUSCLE SPASM OF BACK: ICD-10-CM

## 2024-03-25 DIAGNOSIS — Z79.891 ENCOUNTER FOR MONITORING OPIOID MAINTENANCE THERAPY: ICD-10-CM

## 2024-03-25 DIAGNOSIS — G89.29 CHRONIC BILATERAL LOW BACK PAIN WITHOUT SCIATICA: Primary | ICD-10-CM

## 2024-03-25 DIAGNOSIS — M54.50 CHRONIC BILATERAL LOW BACK PAIN WITHOUT SCIATICA: Primary | ICD-10-CM

## 2024-03-25 PROCEDURE — 99214 OFFICE O/P EST MOD 30 MIN: CPT

## 2024-03-25 RX ORDER — TRAMADOL HYDROCHLORIDE 100 MG/1
100 TABLET, COATED ORAL EVERY 6 HOURS PRN
Qty: 120 TABLET | Refills: 0 | Status: SHIPPED | OUTPATIENT
Start: 2024-04-15

## 2024-03-25 ASSESSMENT — ENCOUNTER SYMPTOMS
GASTROINTESTINAL NEGATIVE: 1
BOWEL INCONTINENCE: 0
RESPIRATORY NEGATIVE: 1
EYES NEGATIVE: 1
BACK PAIN: 1
CONSTIPATION: 0

## 2024-03-25 ASSESSMENT — PAIN DESCRIPTION - LOCATION: LOCATION: BACK

## 2024-03-25 ASSESSMENT — PAIN SCALES - GENERAL: PAINLEVEL_OUTOF10: 6

## 2024-03-25 ASSESSMENT — PAIN DESCRIPTION - PAIN TYPE: TYPE: CHRONIC PAIN

## 2024-03-25 NOTE — PROGRESS NOTES
[facility]    Office Visit    Patient Name: Claudia Sanderson    MR #: [id number]    Account #:[id number]    : 1965    Age: 58 y.o.    Sex: female    Date: 3/25/2024    PCP: Gómez Horn DO    Chief Complaint:   Chief Complaint   Patient presents with    Back Pain       History of Present Illness:   The patient is a 58 y.o. female who presents to the office for a 3 month follow up. Patient continues Tramadol and Robaxin with relief in pain which allows her to complete her ADLs.     Patient works at as a [employer] . Patient has noted early refills on [facility] where pharmacy has filled prescription early. Patient reports this is due to not being able to get to the pharmacy on the day its due because of her work schedule and being out of town. Patient informed that since Tramadol is a controlled substance that early refills are not able to be done do to RICK guidelines. Patient informed medication must last 30 days.       Back Pain  This is a chronic problem. The current episode started more than 1 year ago. The problem occurs constantly. The problem has been waxing and waning since onset. The pain is present in the lumbar spine and thoracic spine. The quality of the pain is described as aching. Radiates to: Thoracic under shoulder blade. The pain is at a severity of 6/10. The pain is mild. The symptoms are aggravated by sitting and standing (breathing). Associated symptoms include leg pain (back of legs), paresthesias and weakness. Pertinent negatives include no bladder incontinence, bowel incontinence or numbness. She has tried heat, home exercises, ice, muscle relaxant and NSAIDs for the symptoms. The treatment provided mild relief.       Past Imaging  Patient EMG/NVC on 10/1/2022 indicates mild CTS bilaterally.     MRI of Thoracic Spine 2021 - Advanced facet   arthropathy with neuroforaminal narrowing on the right at T3-T4, T4-T5, T5-T6    MRI lumbar

## 2024-03-25 NOTE — TELEPHONE ENCOUNTER
1. Rheumatoid arthritis involving vertebra with positive rheumatoid factor (HCC)      Requested Prescriptions     Pending Prescriptions Disp Refills    traMADol HCl 100 MG TABS 120 tablet 0     Sig: Take 100 mg by mouth every 6 hours as needed (pain) May fill 4/15/2024 Max Daily Amount: 400 mg       Continue medication with refill sent at appointment yes; refill sent to medical director at appointment no, see refill encounter dated 3/25/2024    Electronically signed by ALESSANDRA Haley CNP on 3/25/2024 at 2:19 PM

## 2024-03-25 NOTE — PROGRESS NOTES
Clinic Documentation      Education Provided:  [x] Review of Kleber  [] Agreement Review  [x] PEG Score Calculated [] PHQ Score Calculated [] ORT Score Calculated    [] Compliance Issues Discussed [] Cognitive Behavior Needs [x] Exercise [] Review of Test [] Financial Issues  [x] Tobacco/Alcohol Use Reviewed [x] Teaching [] New Patient [] Picture Obtained    Physician Plan:  [] Outgoing Referral  [] Pharmacy Consult  [x] Test Ordered [x] Prescription Ordered/Changed   [] Obtained Test Results / Consult Notes        Complete if patient is withholding blood thinner for procedure     Blood Thinner Patient is currently taking:      [] Plavix (Hold for 7 days)  [] Aspirin (Hold for 5 days)     [] Pletal (Hold for 2 days)  [] Pradaxa (Hold for 3 days)    [] Effient (Hold for 7 days)  [] Xarelto (Hold for 2 days)    [] Eliquis (Hold for 2 days)  [] Brilinta (Hold for 7 days)    [] Coumadin (Hold for 5 days) - (INR needs to be drawn the day prior to procedure- INR < 2.0)    [] Aggrenox (Hold for 7 days)        [] Patient will stop medication on their own.    [] Blood Thinner Form Faxed for approval to hold.   Provider form faxed to:     Assessment Completed by:  Electronically signed by Donna Fuentes MA on 3/25/2024 at 1:58 PM

## 2024-04-22 DIAGNOSIS — M62.830 MUSCLE SPASM OF BACK: ICD-10-CM

## 2024-04-22 RX ORDER — METHOCARBAMOL 750 MG/1
1500 TABLET, FILM COATED ORAL 4 TIMES DAILY
Qty: 240 TABLET | Refills: 2 | Status: SHIPPED | OUTPATIENT
Start: 2024-05-05 | End: 2024-08-03

## 2024-05-09 DIAGNOSIS — M45.9 RHEUMATOID ARTHRITIS INVOLVING VERTEBRA WITH POSITIVE RHEUMATOID FACTOR (HCC): ICD-10-CM

## 2024-05-09 RX ORDER — TRAMADOL HYDROCHLORIDE 100 MG/1
100 TABLET, COATED ORAL EVERY 6 HOURS PRN
Qty: 120 TABLET | Refills: 0 | Status: SHIPPED | OUTPATIENT
Start: 2024-05-15

## 2024-05-10 NOTE — TELEPHONE ENCOUNTER
PCP: Maria Ines Kamara DO    Last Visit 12/18/2023   Future Appointments   Date Time Provider Department Center   6/24/2024 12:00 PM Maria Ines Kamara DO [facility]       Requested Prescriptions      No prescriptions requested or ordered in this encounter         Other Comments: Last Refill-ambien 12/18/2023, however  Patient has appt with Dr Kamara on June 24th and will run out of her ambien before that appointment.  She will need about 10 days of pills to get her to her appointment.  Her appt is on 6/24/2024   Pt called wanting to speak with Malou APARICIO today. When attempted to gather info, pt stated, she wanted to talk to her about a medication that is being tapered. Pt did not want to give any additional info. Malou APARICIO made aware.

## 2024-06-06 DIAGNOSIS — M45.9 RHEUMATOID ARTHRITIS INVOLVING VERTEBRA WITH POSITIVE RHEUMATOID FACTOR (HCC): ICD-10-CM

## 2024-06-10 RX ORDER — TRAMADOL HYDROCHLORIDE 100 MG/1
100 TABLET, COATED ORAL EVERY 6 HOURS PRN
Qty: 120 TABLET | Refills: 0 | Status: SHIPPED | OUTPATIENT
Start: 2024-06-14

## 2024-07-10 ENCOUNTER — HOSPITAL ENCOUNTER (OUTPATIENT)
Dept: PAIN MANAGEMENT | Age: 59
Discharge: HOME | End: 2024-07-10

## 2024-07-10 VITALS
TEMPERATURE: 96.9 F | OXYGEN SATURATION: 100 % | DIASTOLIC BLOOD PRESSURE: 87 MMHG | BODY MASS INDEX: 20.16 KG/M2 | RESPIRATION RATE: 16 BRPM | HEART RATE: 60 BPM | WEIGHT: 110.2 LBS | SYSTOLIC BLOOD PRESSURE: 161 MMHG

## 2024-07-10 DIAGNOSIS — M54.50 CHRONIC BILATERAL LOW BACK PAIN WITHOUT SCIATICA: ICD-10-CM

## 2024-07-10 DIAGNOSIS — M62.830 MUSCLE SPASM OF BACK: ICD-10-CM

## 2024-07-10 DIAGNOSIS — G89.29 CHRONIC BILATERAL LOW BACK PAIN WITHOUT SCIATICA: ICD-10-CM

## 2024-07-10 DIAGNOSIS — M41.25 OTHER IDIOPATHIC SCOLIOSIS, THORACOLUMBAR REGION: Primary | ICD-10-CM

## 2024-07-10 DIAGNOSIS — F11.90 CHRONIC, CONTINUOUS USE OF OPIOIDS: ICD-10-CM

## 2024-07-10 DIAGNOSIS — M45.9 RHEUMATOID ARTHRITIS INVOLVING VERTEBRA WITH POSITIVE RHEUMATOID FACTOR (HCC): ICD-10-CM

## 2024-07-10 PROBLEM — M41.9 SCOLIOSIS: Status: ACTIVE | Noted: 2024-07-10

## 2024-07-10 PROCEDURE — 99215 OFFICE O/P EST HI 40 MIN: CPT

## 2024-07-10 PROCEDURE — 99214 OFFICE O/P EST MOD 30 MIN: CPT | Performed by: NURSE PRACTITIONER

## 2024-07-10 RX ORDER — TRAMADOL HYDROCHLORIDE 100 MG/1
100 TABLET, COATED ORAL EVERY 6 HOURS PRN
Qty: 120 TABLET | Refills: 0 | Status: SHIPPED | OUTPATIENT
Start: 2024-07-15

## 2024-07-10 RX ORDER — METHOCARBAMOL 750 MG/1
1500 TABLET, FILM COATED ORAL 4 TIMES DAILY
Qty: 240 TABLET | Refills: 2 | Status: SHIPPED | OUTPATIENT
Start: 2024-07-10 | End: 2024-10-08

## 2024-07-10 ASSESSMENT — ENCOUNTER SYMPTOMS
CHEST TIGHTNESS: 0
ABDOMINAL PAIN: 0
EYE PAIN: 0
SHORTNESS OF BREATH: 0
BACK PAIN: 1
SINUS PAIN: 0
APNEA: 0

## 2024-07-10 ASSESSMENT — PAIN DESCRIPTION - FREQUENCY: FREQUENCY: CONTINUOUS

## 2024-07-10 ASSESSMENT — PAIN DESCRIPTION - ORIENTATION: ORIENTATION: RIGHT;LEFT

## 2024-07-10 ASSESSMENT — PAIN - FUNCTIONAL ASSESSMENT: PAIN_FUNCTIONAL_ASSESSMENT: PREVENTS OR INTERFERES SOME ACTIVE ACTIVITIES AND ADLS

## 2024-07-10 ASSESSMENT — PAIN SCALES - GENERAL: PAINLEVEL_OUTOF10: 5

## 2024-07-10 ASSESSMENT — PAIN DESCRIPTION - PAIN TYPE: TYPE: CHRONIC PAIN

## 2024-07-10 ASSESSMENT — PAIN DESCRIPTION - ONSET: ONSET: ON-GOING

## 2024-07-10 ASSESSMENT — PAIN DESCRIPTION - LOCATION: LOCATION: BACK

## 2024-07-10 NOTE — PROGRESS NOTES
Clinic Documentation      Education Provided:  [x] Review of Kleber  [x] Agreement Review  [x] PEG Score Calculated [x] PHQ Score Calculated [x] ORT Score Calculated    [] Compliance Issues Discussed [] Cognitive Behavior Needs [x] Exercise [] Review of Test [] Financial Issues  [x] Tobacco/Alcohol Use Reviewed [x] Teaching [] New Patient [] Picture Obtained    Physician Plan:  [] Outgoing Referral  [] Pharmacy Consult  [] Test Ordered [x] Prescription Ordered/Changed   [] Obtained Test Results / Consult Notes        Complete if patient is withholding blood thinner for procedure     Blood Thinner Patient is currently taking:      [] Plavix (Hold for 7 days)  [] Aspirin (Hold for 5 days)     [] Pletal (Hold for 2 days)  [] Pradaxa (Hold for 3 days)    [] Effient (Hold for 7 days)  [] Xarelto (Hold for 2 days)    [] Eliquis (Hold for 2 days)  [] Brilinta (Hold for 7 days)    [] Coumadin (Hold for 5 days) - (INR needs to be drawn the day prior to procedure- INR < 2.0)    [] Aggrenox (Hold for 7 days)        [] Patient will stop medication on their own.    [] Blood Thinner Form Faxed for approval to hold.   Provider form faxed to:     Assessment Completed by:  Electronically signed by Brandi Drake RN on 7/10/2024 at 11:07 AM

## 2024-07-10 NOTE — H&P
like things are dirty, not wanting to touch things.    .    FAMILY PSYCHIATRIC HISTORY    Father, anxiety, depression, mood swings, anger/irritability    Daughter, ADHD    .                      Family History  family history includes Colon Cancer in her paternal grandmother; Colon Polyps in her father and paternal grandmother; Depression in her father and sister; Heart Disease in her father; Lung Cancer in her father.    Review of Systems:  Review of Systems   Constitutional:  Negative for activity change, appetite change and fever.   HENT:  Negative for ear pain and sinus pain.    Eyes:  Negative for pain.   Respiratory:  Negative for apnea, chest tightness and shortness of breath.    Cardiovascular:  Negative for chest pain.   Gastrointestinal:  Negative for abdominal pain.   Endocrine: Negative for cold intolerance and heat intolerance.   Genitourinary:  Negative for difficulty urinating and flank pain.   Musculoskeletal:  Positive for back pain.   Neurological:  Negative for weakness and numbness.   Psychiatric/Behavioral:  Negative for agitation, behavioral problems and suicidal ideas.         14 point ROS negative besides that noted in HPI    Physical exam:     Patient Vitals for the past 24 hrs:   BP Temp Pulse Resp SpO2 Weight   07/10/24 1033 (!) 161/87 96.9 °F (36.1 °C) 60 16 100 % 50 kg (110 lb 3.2 oz)       Body mass index is 20.16 kg/m².    Physical Exam  Constitutional:       Appearance: Normal appearance. She is normal weight.   HENT:      Head: Normocephalic.      Nose: Nose normal.   Cardiovascular:      Rate and Rhythm: Normal rate and regular rhythm.      Pulses: Normal pulses.   Pulmonary:      Effort: Pulmonary effort is normal.   Abdominal:      Palpations: Abdomen is soft.   Musculoskeletal:      Cervical back: Spasms present. Pain with movement present. Decreased range of motion.      Thoracic back: Tenderness and bony tenderness present. Decreased range of motion.      Lumbar back: Spasms

## 2024-08-07 DIAGNOSIS — M45.9 RHEUMATOID ARTHRITIS INVOLVING VERTEBRA WITH POSITIVE RHEUMATOID FACTOR (HCC): ICD-10-CM

## 2024-08-07 RX ORDER — TRAMADOL HYDROCHLORIDE 100 MG/1
100 TABLET, COATED ORAL EVERY 6 HOURS PRN
Qty: 120 TABLET | Refills: 0 | Status: SHIPPED | OUTPATIENT
Start: 2024-08-14

## 2024-08-18 ENCOUNTER — TRANSCRIBE ORDERS (OUTPATIENT)
Dept: ADMINISTRATIVE | Facility: HOSPITAL | Age: 59
End: 2024-08-18

## 2024-08-18 DIAGNOSIS — Z12.2 SCREENING FOR LUNG CANCER: ICD-10-CM

## 2024-08-18 DIAGNOSIS — F17.200 SMOKING: Primary | ICD-10-CM

## 2024-09-04 DIAGNOSIS — M45.9 RHEUMATOID ARTHRITIS INVOLVING VERTEBRA WITH POSITIVE RHEUMATOID FACTOR (HCC): ICD-10-CM

## 2024-09-04 RX ORDER — TRAMADOL HYDROCHLORIDE 100 MG/1
100 TABLET, COATED ORAL EVERY 6 HOURS PRN
Qty: 120 TABLET | Refills: 0 | Status: SHIPPED | OUTPATIENT
Start: 2024-09-13

## 2024-09-30 DIAGNOSIS — M62.830 MUSCLE SPASM OF BACK: ICD-10-CM

## 2024-09-30 DIAGNOSIS — M45.9 RHEUMATOID ARTHRITIS INVOLVING VERTEBRA WITH POSITIVE RHEUMATOID FACTOR (HCC): ICD-10-CM

## 2024-09-30 RX ORDER — TRAMADOL HYDROCHLORIDE 100 MG/1
100 TABLET, COATED ORAL EVERY 6 HOURS PRN
Qty: 120 TABLET | Refills: 0 | Status: SHIPPED | OUTPATIENT
Start: 2024-10-13

## 2024-09-30 RX ORDER — METHOCARBAMOL 750 MG/1
1500 TABLET, FILM COATED ORAL 4 TIMES DAILY
Qty: 240 TABLET | Refills: 2 | Status: SHIPPED | OUTPATIENT
Start: 2024-10-08 | End: 2025-01-06

## 2024-10-23 ENCOUNTER — HOSPITAL ENCOUNTER (OUTPATIENT)
Dept: PAIN MANAGEMENT | Age: 59
Discharge: HOME | End: 2024-10-23

## 2024-10-23 VITALS
RESPIRATION RATE: 16 BRPM | HEIGHT: 61 IN | WEIGHT: 111 LBS | TEMPERATURE: 97.1 F | DIASTOLIC BLOOD PRESSURE: 88 MMHG | HEART RATE: 66 BPM | SYSTOLIC BLOOD PRESSURE: 155 MMHG | BODY MASS INDEX: 20.96 KG/M2 | OXYGEN SATURATION: 100 %

## 2024-10-23 DIAGNOSIS — M41.9 SCOLIOSIS OF CERVICOTHORACIC SPINE, UNSPECIFIED SCOLIOSIS TYPE: ICD-10-CM

## 2024-10-23 DIAGNOSIS — M62.830 MUSCLE SPASM OF BACK: ICD-10-CM

## 2024-10-23 DIAGNOSIS — M47.24 THORACIC RADICULOPATHY DUE TO OSTEOARTHRITIS OF SPINE: ICD-10-CM

## 2024-10-23 DIAGNOSIS — M45.9 RHEUMATOID ARTHRITIS INVOLVING VERTEBRA WITH POSITIVE RHEUMATOID FACTOR (HCC): ICD-10-CM

## 2024-10-23 DIAGNOSIS — G89.29 CHRONIC BILATERAL LOW BACK PAIN WITHOUT SCIATICA: Primary | ICD-10-CM

## 2024-10-23 DIAGNOSIS — F11.90 CHRONIC, CONTINUOUS USE OF OPIOIDS: ICD-10-CM

## 2024-10-23 DIAGNOSIS — M54.50 CHRONIC BILATERAL LOW BACK PAIN WITHOUT SCIATICA: Primary | ICD-10-CM

## 2024-10-23 PROCEDURE — 99213 OFFICE O/P EST LOW 20 MIN: CPT

## 2024-10-23 RX ORDER — TRAMADOL HYDROCHLORIDE 100 MG/1
100 TABLET, COATED ORAL EVERY 6 HOURS PRN
Qty: 120 TABLET | Refills: 0 | Status: SHIPPED | OUTPATIENT
Start: 2024-11-12

## 2024-10-23 RX ORDER — METHOCARBAMOL 750 MG/1
1500 TABLET, FILM COATED ORAL 4 TIMES DAILY
Qty: 240 TABLET | Refills: 2 | Status: SHIPPED | OUTPATIENT
Start: 2024-10-23 | End: 2025-01-21

## 2024-10-23 ASSESSMENT — ENCOUNTER SYMPTOMS
CONSTIPATION: 0
BACK PAIN: 1
GASTROINTESTINAL NEGATIVE: 1
EYES NEGATIVE: 1
BOWEL INCONTINENCE: 0
RESPIRATORY NEGATIVE: 1

## 2024-10-23 ASSESSMENT — PAIN DESCRIPTION - LOCATION: LOCATION: BACK

## 2024-10-23 ASSESSMENT — PAIN SCALES - GENERAL: PAINLEVEL_OUTOF10: 5

## 2024-10-23 ASSESSMENT — PAIN DESCRIPTION - PAIN TYPE: TYPE: CHRONIC PAIN

## 2024-10-23 ASSESSMENT — PAIN DESCRIPTION - ORIENTATION: ORIENTATION: LOWER

## 2024-10-23 NOTE — TELEPHONE ENCOUNTER
1. Muscle spasm of back    2. Rheumatoid arthritis involving vertebra with positive rheumatoid factor (HCC)      Requested Prescriptions     Pending Prescriptions Disp Refills    methocarbamol (ROBAXIN) 750 MG tablet 240 tablet 2     Sig: Take 2 tablets by mouth 4 times daily    traMADol HCl 100 MG TABS 120 tablet 0     Sig: Take 100 mg by mouth every 6 hours as needed (pain) May fill 11/12/24       Continue medication with refill sent at appointment yes; refill sent to medical director at appointment no, see refill encounter dated 10/23/2024    Electronically signed by ALESSANDRA Haley CNP on 10/23/2024 at 10:36 AM

## 2024-10-23 NOTE — PROGRESS NOTES
Clinic Documentation      Education Provided:  [x] Review of Kleber  [] Agreement Review  [x] PEG Score Calculated [] PHQ Score Calculated [] ORT Score Calculated    [] Compliance Issues Discussed [] Cognitive Behavior Needs [x] Exercise [] Review of Test [] Financial Issues  [x] Tobacco/Alcohol Use Reviewed [x] Teaching [] New Patient [] Picture Obtained    Physician Plan:  [] Outgoing Referral  [] Pharmacy Consult  [] Test Ordered [x] Prescription Ordered/Changed   [] Obtained Test Results / Consult Notes        Complete if patient is withholding blood thinner for procedure     Blood Thinner Patient is currently taking:      [] Plavix (Hold for 7 days)  [] Aspirin (Hold for 5 days)     [] Pletal (Hold for 2 days)  [] Pradaxa (Hold for 3 days)    [] Effient (Hold for 7 days)  [] Xarelto (Hold for 2 days)    [] Eliquis (Hold for 2 days)  [] Brilinta (Hold for 7 days)    [] Coumadin (Hold for 5 days) - (INR needs to be drawn the day prior to procedure- INR < 2.0)    [] Aggrenox (Hold for 7 days)        [] Patient will stop medication on their own.    [] Blood Thinner Form Faxed for approval to hold.   Provider form faxed to:     Assessment Completed by:  Electronically signed by Isamar Valdes RN on 10/23/2024 at 10:42 AM  
continued   [] New patient continuing current medication while developing POC   [] On going assessment and evaluation of medication regimen  [] Medication regimen not effective see plan for changes  [x] Kleber reviewed & on file under media     CC:  Gómez Horn, ALESSANDRA Calderon - CNP, 10/23/2024 at 11:11 AM    EMR dragon/transcription disclaimer: Much of this encounter note is electronic transcription/translation of spoken language to printed tach. Electronic translation of spoken language may be erroneous, or at times, nonsensical words or phrases may be inadvertently transcribed. Although, I have reviewed the note for such errors, some may still exist.

## 2024-12-04 DIAGNOSIS — M45.9 RHEUMATOID ARTHRITIS INVOLVING VERTEBRA WITH POSITIVE RHEUMATOID FACTOR (HCC): ICD-10-CM

## 2024-12-04 RX ORDER — TRAMADOL HYDROCHLORIDE 100 MG/1
100 TABLET, COATED ORAL EVERY 6 HOURS PRN
Qty: 120 TABLET | Refills: 0 | Status: SHIPPED | OUTPATIENT
Start: 2024-12-12 | End: 2025-01-11

## 2024-12-04 NOTE — TELEPHONE ENCOUNTER
Last seen in office visit: 10/23/24  Next scheduled office visit: 03/12/25  Last UDS results: 03/25/24  Any discrepancies on Kleber (such as refills from another provider): no

## 2025-01-03 DIAGNOSIS — M45.9 RHEUMATOID ARTHRITIS INVOLVING VERTEBRA WITH POSITIVE RHEUMATOID FACTOR (HCC): ICD-10-CM

## 2025-01-03 NOTE — TELEPHONE ENCOUNTER
Last seen in office visit: 10/23/24  Next scheduled office visit: 03/12/25  Last UDS results: 03/25/24  Any discrepancies on Kleber (such as refills from another provider): no    Pt called 01/02/25 @3992

## 2025-01-07 RX ORDER — TRAMADOL HYDROCHLORIDE 100 MG/1
100 TABLET, COATED ORAL EVERY 6 HOURS PRN
Qty: 120 TABLET | Refills: 0 | Status: SHIPPED | OUTPATIENT
Start: 2025-01-11 | End: 2025-02-10

## 2025-02-04 DIAGNOSIS — M45.9 RHEUMATOID ARTHRITIS INVOLVING VERTEBRA WITH POSITIVE RHEUMATOID FACTOR (HCC): ICD-10-CM

## 2025-02-04 RX ORDER — TRAMADOL HYDROCHLORIDE 100 MG/1
100 TABLET, COATED ORAL EVERY 6 HOURS PRN
Qty: 120 TABLET | Refills: 0 | Status: SHIPPED | OUTPATIENT
Start: 2025-02-10 | End: 2025-03-12

## 2025-02-04 NOTE — TELEPHONE ENCOUNTER
Last seen in office visit: 10/23/24  Next scheduled office visit: 3/12/25 with Raquel  Last UDS results: compliant  Any discrepancies on Kleber (such as refills from another provider): none

## 2025-03-12 ENCOUNTER — OFFICE VISIT (OUTPATIENT)
Dept: PAIN MANAGEMENT | Age: 60
End: 2025-03-12

## 2025-03-12 VITALS
OXYGEN SATURATION: 100 % | DIASTOLIC BLOOD PRESSURE: 85 MMHG | TEMPERATURE: 96.9 F | SYSTOLIC BLOOD PRESSURE: 136 MMHG | WEIGHT: 113 LBS | BODY MASS INDEX: 21.34 KG/M2 | HEART RATE: 60 BPM | RESPIRATION RATE: 16 BRPM | HEIGHT: 61 IN

## 2025-03-12 DIAGNOSIS — Z51.81 ENCOUNTER FOR MONITORING OPIOID MAINTENANCE THERAPY: ICD-10-CM

## 2025-03-12 DIAGNOSIS — F11.90 CHRONIC, CONTINUOUS USE OF OPIOIDS: ICD-10-CM

## 2025-03-12 DIAGNOSIS — M62.830 MUSCLE SPASM OF BACK: ICD-10-CM

## 2025-03-12 DIAGNOSIS — Z79.891 ENCOUNTER FOR MONITORING OPIOID MAINTENANCE THERAPY: ICD-10-CM

## 2025-03-12 DIAGNOSIS — G89.29 CHRONIC BILATERAL LOW BACK PAIN WITHOUT SCIATICA: Primary | Chronic | ICD-10-CM

## 2025-03-12 DIAGNOSIS — M54.50 CHRONIC BILATERAL LOW BACK PAIN WITHOUT SCIATICA: Primary | Chronic | ICD-10-CM

## 2025-03-12 DIAGNOSIS — M45.9 RHEUMATOID ARTHRITIS INVOLVING VERTEBRA WITH POSITIVE RHEUMATOID FACTOR (HCC): ICD-10-CM

## 2025-03-12 PROCEDURE — 99214 OFFICE O/P EST MOD 30 MIN: CPT

## 2025-03-12 RX ORDER — ONDANSETRON 4 MG/1
TABLET, FILM COATED ORAL
COMMUNITY
Start: 2025-02-25

## 2025-03-12 RX ORDER — ERYTHROMYCIN 5 MG/G
OINTMENT OPHTHALMIC
COMMUNITY
Start: 2025-02-04

## 2025-03-12 RX ORDER — DEXTROMETHORPHAN HYDROBROMIDE AND PROMETHAZINE HYDROCHLORIDE 15; 6.25 MG/5ML; MG/5ML
SYRUP ORAL
COMMUNITY
Start: 2025-02-25

## 2025-03-12 RX ORDER — TRAMADOL HYDROCHLORIDE 100 MG/1
100 TABLET, COATED ORAL EVERY 6 HOURS PRN
Qty: 120 TABLET | Refills: 0 | Status: SHIPPED | OUTPATIENT
Start: 2025-03-12 | End: 2025-04-11

## 2025-03-12 RX ORDER — METHOCARBAMOL 750 MG/1
1500 TABLET, FILM COATED ORAL 4 TIMES DAILY
Qty: 240 TABLET | Refills: 2 | Status: SHIPPED | OUTPATIENT
Start: 2025-03-12 | End: 2025-06-10

## 2025-03-12 ASSESSMENT — PATIENT HEALTH QUESTIONNAIRE - PHQ9
2. FEELING DOWN, DEPRESSED OR HOPELESS: NOT AT ALL
SUM OF ALL RESPONSES TO PHQ QUESTIONS 1-9: 4
3. TROUBLE FALLING OR STAYING ASLEEP: MORE THAN HALF THE DAYS
4. FEELING TIRED OR HAVING LITTLE ENERGY: MORE THAN HALF THE DAYS
6. FEELING BAD ABOUT YOURSELF - OR THAT YOU ARE A FAILURE OR HAVE LET YOURSELF OR YOUR FAMILY DOWN: NOT AT ALL
7. TROUBLE CONCENTRATING ON THINGS, SUCH AS READING THE NEWSPAPER OR WATCHING TELEVISION: NOT AT ALL
5. POOR APPETITE OR OVEREATING: NOT AT ALL
10. IF YOU CHECKED OFF ANY PROBLEMS, HOW DIFFICULT HAVE THESE PROBLEMS MADE IT FOR YOU TO DO YOUR WORK, TAKE CARE OF THINGS AT HOME, OR GET ALONG WITH OTHER PEOPLE: NOT DIFFICULT AT ALL
8. MOVING OR SPEAKING SO SLOWLY THAT OTHER PEOPLE COULD HAVE NOTICED. OR THE OPPOSITE, BEING SO FIGETY OR RESTLESS THAT YOU HAVE BEEN MOVING AROUND A LOT MORE THAN USUAL: NOT AT ALL
9. THOUGHTS THAT YOU WOULD BE BETTER OFF DEAD, OR OF HURTING YOURSELF: NOT AT ALL
SUM OF ALL RESPONSES TO PHQ QUESTIONS 1-9: 4
1. LITTLE INTEREST OR PLEASURE IN DOING THINGS: NOT AT ALL

## 2025-03-12 ASSESSMENT — ENCOUNTER SYMPTOMS
RESPIRATORY NEGATIVE: 1
GASTROINTESTINAL NEGATIVE: 1
BACK PAIN: 1
EYES NEGATIVE: 1

## 2025-03-12 NOTE — PROGRESS NOTES
tolerance, opioid induced hyperalgesia, osteopenia, hypogonadism and immune suppression.  Advised to take the opioid medications as prescribed.    [] Benzodiazapines and Narcotics:  Patient educated on the possible effects of combining Benzodiazapines and Opioids. Explained \"Black Box Warnings\" such as; possible suppressed breathing, hypoxia, anoxia, depressed cognition, heart arrhythmia, coma and possible death. Patient verbalized understanding concerning possible effects.    [] Tobacco Cessation:  Patient educated on the harms of tobacco/nicotine use and was provided with resources on available programs to assist in smoking cessation. Patient does show understanding.  Patient has/does not have the desire to quit smoking in the near future.    Quit Now [place] is a FREE online service available to [place] residents 15 years of age and over. When you become a member,it offers a free telephone service, so you can speak to a  in person, if you would prefer. Call the QuitLine at 1-815-QUITNOW or 1-165.693.3821.  special tools, a support team of coaches, research-based information, and a community of others trying to become tobacco free. Our expert coaches can talk to you about overcoming common barriers, such as dealing with stress, fighting cravings, coping with irritability, and controlling weight gain.  Http://www.cdc.gov/tobacco/campaign/tips/index.htm  Https://www.quitnow[place].org/  CC:  Gómez Horn, DO Raquel Fallon, APRN - CNP, 3/12/2025 at 1:51 PM    EMR dragon/transcription disclaimer: Much of this encounter note is electronic transcription/translation of spoken language to printed tach. Electronic translation of spoken language may be erroneous, or at times, nonsensical words or phrases may be inadvertently transcribed. Although, I have reviewed the note for such errors, some may still exist.    The patient (or guardian, if applicable) and other individuals in attendance with the patient

## 2025-03-31 DIAGNOSIS — Z51.81 ENCOUNTER FOR MONITORING OPIOID MAINTENANCE THERAPY: ICD-10-CM

## 2025-03-31 DIAGNOSIS — Z79.891 ENCOUNTER FOR MONITORING OPIOID MAINTENANCE THERAPY: ICD-10-CM

## 2025-04-07 DIAGNOSIS — M45.9 RHEUMATOID ARTHRITIS INVOLVING VERTEBRA WITH POSITIVE RHEUMATOID FACTOR (HCC): ICD-10-CM

## 2025-04-08 ENCOUNTER — TELEPHONE (OUTPATIENT)
Dept: PAIN MANAGEMENT | Age: 60
End: 2025-04-08

## 2025-04-08 RX ORDER — TRAMADOL HYDROCHLORIDE 100 MG/1
100 TABLET, COATED ORAL EVERY 8 HOURS PRN
Qty: 90 TABLET | Refills: 0 | Status: SHIPPED | OUTPATIENT
Start: 2025-04-11 | End: 2025-05-11

## 2025-04-08 NOTE — TELEPHONE ENCOUNTER
Patient is very upset that her Tramadol 100 mg tablet changed from every 6 hours #120 tablets to every 8 hours #90 tablets without even being told about decrease. She saw ALESSANDRA Ren for office visit 3/12/25 and at that time it was staying the same. Dr. Rojas signed the script and decreased it to the every 8 hours #90. Patient states,\" That man doesn't even know me never laid eyes on me. I have been seeing Raquel since Rachel left last year. I work and have had surgeries on my back the medication helps me do what I need to do everyday and it is Tramadol there are much worse pain drugs out there. Last Neurosurgeon that I saw said he wouldn't touch my back  because surgery would just make the pain worse. I don't understand how you can do this to people.\" I gave patient the Patient Advocate phone number.

## 2025-05-12 DIAGNOSIS — M45.9 RHEUMATOID ARTHRITIS INVOLVING VERTEBRA WITH POSITIVE RHEUMATOID FACTOR (HCC): ICD-10-CM

## 2025-05-12 RX ORDER — TRAMADOL HYDROCHLORIDE 100 MG/1
100 TABLET, COATED ORAL EVERY 6 HOURS PRN
Qty: 120 TABLET | Refills: 0 | Status: SHIPPED | OUTPATIENT
Start: 2025-05-12 | End: 2025-06-11

## 2025-05-12 NOTE — TELEPHONE ENCOUNTER
Last seen in office visit: 3/12/25  Next scheduled office visit: 6/11/25 with Raquel  Last UDS results: compliant  Any discrepancies on Kleber (such as refills from another provider): none

## 2025-05-30 DIAGNOSIS — M62.830 MUSCLE SPASM OF BACK: ICD-10-CM

## 2025-05-30 NOTE — TELEPHONE ENCOUNTER
Patient called Friday, office is closed on Fridays  Last seen in office visit: 03/12/25  Next scheduled office visit: 06/11/25  Last UDS results: 03/12/25  Any discrepancies on Kleber (such as refills from another provider): no

## 2025-06-02 RX ORDER — METHOCARBAMOL 750 MG/1
750-1500 TABLET, FILM COATED ORAL 4 TIMES DAILY PRN
Qty: 180 TABLET | Refills: 2 | Status: SHIPPED | OUTPATIENT
Start: 2025-06-02 | End: 2025-08-31

## 2025-06-03 ENCOUNTER — TELEPHONE (OUTPATIENT)
Dept: PAIN MANAGEMENT | Age: 60
End: 2025-06-03

## 2025-06-03 NOTE — TELEPHONE ENCOUNTER
Patient had left message about her Robaxin being . I spoke with Provider about the decrease and the patient has an office visit 25. But patient had a fall 25 in her garage and her low back hit a metal rack. She said that she saw stars. She didn't go to the ER has taken off work been rotating ice & heat. Still in a lot of pain. She doesn't want to go to the ER. She said maybe next week if still having pain maybe Raquel can order them. She is having increase muscle spasms in her back, leg & knee weakness with numbness going down back of legs. I let her know that she needing imaging done of her back. She still does want to go to the ER. Sending message to Provider.

## 2025-06-03 NOTE — TELEPHONE ENCOUNTER
Called patient to let her know what Provider wanted her to go to the ED. Patient said she is not going to the ER she will see Raquel APARICIO  next week 6/11/25.

## 2025-06-11 DIAGNOSIS — M45.9 RHEUMATOID ARTHRITIS INVOLVING VERTEBRA WITH POSITIVE RHEUMATOID FACTOR (HCC): ICD-10-CM

## 2025-06-11 RX ORDER — TRAMADOL HYDROCHLORIDE 100 MG/1
100 TABLET, COATED ORAL EVERY 6 HOURS PRN
Qty: 120 TABLET | Refills: 0 | Status: SHIPPED | OUTPATIENT
Start: 2025-06-12 | End: 2025-07-12

## 2025-06-11 NOTE — TELEPHONE ENCOUNTER
Influenza Tests  WHY AM I HAVING THIS TEST?  You may have an influenza test to help your health care provider determine what type of respiratory infection you have. The test may also be used to help determine a treatment plan and to monitor influenza activity within a community.  There are two types of influenza virus: types A and B. Often, one strain of type A influenza will be the most common type of influenza in a community during flu season. This is typically between the months of October and May. Influenza tests can help determine which strain of influenza type A is occurring most often in the community.  WHAT KIND OF SAMPLE IS TAKEN?  Influenza tests are performed by collecting a small sample of fluids (secretions) from your nose or throat using a cotton swab. Tests performed on nasal secretions are more accurate than tests performed on a sample taken from your throat.  · Rapid influenza tests are available and have become the most frequently used tests for influenza. They are most accurate when completed within the first 48 hours after your symptoms begin.    Depending on the method, a rapid influenza test may be completed in your health care provider's office in less than 30 minutes. It can also be sent to a lab with the results available the same day.    Depending on the particular type of test used, it can identify influenza type A, a mixture of types A and B, or differentiate between type A and B.  · Another test that your health care provider may order is a viral culture. This also requires the collection of secretions from your nose or throat. The sample is then sent to a lab for processing. This may take several days to complete.  HOW ARE YOUR TEST RESULTS REPORTED?  Your test results will be reported as either positive or negative. A false-negative result can occur. A false-negative result is incorrect because it indicates a condition or finding is not present when it is.  It is your responsibility to  Last seen in office visit: 3/12/25  Next scheduled office visit: 7/7/25 with Raquel  Last UDS results: compliant  Any discrepancies on Kleber (such as refills from another provider): none      Patient cancelled appt on 6/11/25.   obtain your test results. Ask the lab or department performing the test when and how you will get your results.  WHAT DO THE RESULTS MEAN?  · A positive test means you have influenza. Tests may further determine the type of influenza you have.  · A negative influenza test result means it is not likely that you have influenza.  · A false-negative result can occur. False-negative results are more likely to happen at the height of the influenza season.  Talk with your health care provider to discuss your results, treatment options, and if necessary, the need for more tests. Talk with your health care provider if you have any questions about your results.     This information is not intended to replace advice given to you by your health care provider. Make sure you discuss any questions you have with your health care provider.     Document Released: 09/27/2006 Document Revised: 01/08/2016 Document Reviewed: 05/06/2015  Elsevier Interactive Patient Education ©2016 [employer]

## 2025-07-07 ENCOUNTER — OFFICE VISIT (OUTPATIENT)
Dept: PAIN MANAGEMENT | Age: 60
End: 2025-07-07
Payer: COMMERCIAL

## 2025-07-07 VITALS
HEART RATE: 72 BPM | OXYGEN SATURATION: 98 % | HEIGHT: 62 IN | WEIGHT: 110 LBS | RESPIRATION RATE: 16 BRPM | SYSTOLIC BLOOD PRESSURE: 135 MMHG | BODY MASS INDEX: 20.24 KG/M2 | TEMPERATURE: 97.5 F | DIASTOLIC BLOOD PRESSURE: 85 MMHG

## 2025-07-07 DIAGNOSIS — M54.50 CHRONIC BILATERAL LOW BACK PAIN WITHOUT SCIATICA: Primary | ICD-10-CM

## 2025-07-07 DIAGNOSIS — F11.90 CHRONIC, CONTINUOUS USE OF OPIOIDS: ICD-10-CM

## 2025-07-07 DIAGNOSIS — G89.29 CHRONIC BILATERAL LOW BACK PAIN WITHOUT SCIATICA: Primary | ICD-10-CM

## 2025-07-07 DIAGNOSIS — M45.9 RHEUMATOID ARTHRITIS INVOLVING VERTEBRA WITH POSITIVE RHEUMATOID FACTOR (HCC): ICD-10-CM

## 2025-07-07 PROCEDURE — 99213 OFFICE O/P EST LOW 20 MIN: CPT

## 2025-07-07 RX ORDER — TRAMADOL HYDROCHLORIDE 100 MG/1
100 TABLET, COATED ORAL EVERY 6 HOURS PRN
Qty: 120 TABLET | Refills: 0 | Status: SHIPPED | OUTPATIENT
Start: 2025-07-12 | End: 2025-08-11

## 2025-07-07 RX ORDER — EZETIMIBE 10 MG/1
10 TABLET ORAL DAILY
COMMUNITY

## 2025-07-07 ASSESSMENT — ENCOUNTER SYMPTOMS
EYES NEGATIVE: 1
GASTROINTESTINAL NEGATIVE: 1
RESPIRATORY NEGATIVE: 1
BACK PAIN: 1

## 2025-07-07 NOTE — PROGRESS NOTES
GLUCOSE 99 09/24/2021 02:08 PM    CALCIUM 9.8 09/24/2021 02:08 PM        Lab Results   Component Value Date    WBC 6.5 09/24/2021    HGB 13.4 09/24/2021    HCT 41.3 09/24/2021    MCV 97.4 09/24/2021     09/24/2021       Urine Drug Screenings (1 yr)       Urine Drug Screen  Collected: 6/14/2021 12:12 PM (Final result)              Urine Drug Screen  Collected: 2/14/2020 11:49 AM (Final result)              Urine Drug Screen  Collected: 4/26/2019  2:30 AM (Final result)              Urine Drug Screen  Collected: 3/30/2019  2:39 AM (Final result)              DRUG PROFILE  Collected: 4/27/2015  1:35 PM (Final result)                     ASSESSMENT AND PLAN:    1. Chronic bilateral low back pain without sciatica  2. 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 Chronic, continuous use of opioids      Proceed with xrays ordered by PCP. Patient to have imaging results sent to office once she completes  Assessment & Plan  1. Back pain.  - The patient reports increased back pain is likely due to a recent fall, with symptoms including numbness and tingling radiating down the legs and into the feet.  - Physical exam findings suggest tenderness in the mid-back area under the bra line.  - X-rays were recommended by PCP to rule out fractures or other structural issues; if symptoms persist without findings on x-rays, updated MRIs will be considered.  - Prescribed medications include tramadol, methocarbamol, Celebrex, and Tylenol. A refill for tramadol was provided and sent to the pharmacy.    2. Incontinence.  - The patient's incontinence may be related to her spinal condition or chronic opioid use.  - The patient reported occasional loss of bladder control, which could be indicative of a more serious condition if it

## 2025-07-22 ENCOUNTER — HOSPITAL ENCOUNTER (OUTPATIENT)
Dept: GENERAL RADIOLOGY | Facility: HOSPITAL | Age: 60
Discharge: HOME | End: 2025-07-22
Admitting: INTERNAL MEDICINE
Payer: COMMERCIAL

## 2025-07-22 ENCOUNTER — TRANSCRIBE ORDERS (OUTPATIENT)
Dept: ADMINISTRATIVE | Facility: HOSPITAL | Age: 60
End: 2025-07-22
Payer: COMMERCIAL

## 2025-07-22 DIAGNOSIS — Z12.31 SCREENING MAMMOGRAM FOR BREAST CANCER: Primary | ICD-10-CM

## 2025-07-22 DIAGNOSIS — M54.6 THORACIC BACK PAIN, UNSPECIFIED BACK PAIN LATERALITY, UNSPECIFIED CHRONICITY: ICD-10-CM

## 2025-07-22 DIAGNOSIS — Z78.0 POSTMENOPAUSAL: ICD-10-CM

## 2025-07-22 PROCEDURE — 72072 X-RAY EXAM THORAC SPINE 3VWS: CPT

## 2025-07-24 ENCOUNTER — APPOINTMENT (OUTPATIENT)
Dept: MRI IMAGING | Facility: HOSPITAL | Age: 60
End: 2025-07-24
Payer: COMMERCIAL

## 2025-07-24 ENCOUNTER — HOSPITAL ENCOUNTER (EMERGENCY)
Facility: HOSPITAL | Age: 60
Discharge: HOME | End: 2025-07-24
Admitting: FAMILY MEDICINE
Payer: COMMERCIAL

## 2025-07-24 VITALS
BODY MASS INDEX: 20.24 KG/M2 | TEMPERATURE: 98.2 F | RESPIRATION RATE: 16 BRPM | SYSTOLIC BLOOD PRESSURE: 189 MMHG | OXYGEN SATURATION: 99 % | HEIGHT: 62 IN | DIASTOLIC BLOOD PRESSURE: 99 MMHG | HEART RATE: 53 BPM | WEIGHT: 110 LBS

## 2025-07-24 DIAGNOSIS — M54.9 ACUTE MIDLINE BACK PAIN, UNSPECIFIED BACK LOCATION: Primary | ICD-10-CM

## 2025-07-24 LAB
ALBUMIN SERPL-MCNC: 3.9 G/DL (ref 3.5–5.2)
ALBUMIN/GLOB SERPL: 1.4 G/DL
ALP SERPL-CCNC: 84 U/L (ref 39–117)
ALT SERPL W P-5'-P-CCNC: 7 U/L (ref 1–33)
ANION GAP SERPL CALCULATED.3IONS-SCNC: 12 MMOL/L (ref 5–15)
AST SERPL-CCNC: 13 U/L (ref 1–32)
BASOPHILS # BLD AUTO: 0.04 10*3/MM3 (ref 0–0.2)
BASOPHILS NFR BLD AUTO: 0.5 % (ref 0–1.5)
BILIRUB SERPL-MCNC: <0.2 MG/DL (ref 0–1.2)
BILIRUB UR QL STRIP: NEGATIVE
BUN SERPL-MCNC: 7.4 MG/DL (ref 6–20)
BUN/CREAT SERPL: 8.8 (ref 7–25)
CALCIUM SPEC-SCNC: 9.1 MG/DL (ref 8.6–10.5)
CHLORIDE SERPL-SCNC: 105 MMOL/L (ref 98–107)
CLARITY UR: CLEAR
CO2 SERPL-SCNC: 25 MMOL/L (ref 22–29)
COLOR UR: YELLOW
CREAT SERPL-MCNC: 0.84 MG/DL (ref 0.57–1)
DEPRECATED RDW RBC AUTO: 43.3 FL (ref 37–54)
EGFRCR SERPLBLD CKD-EPI 2021: 80.2 ML/MIN/1.73
EOSINOPHIL # BLD AUTO: 0.17 10*3/MM3 (ref 0–0.4)
EOSINOPHIL NFR BLD AUTO: 2.3 % (ref 0.3–6.2)
ERYTHROCYTE [DISTWIDTH] IN BLOOD BY AUTOMATED COUNT: 12.6 % (ref 12.3–15.4)
GLOBULIN UR ELPH-MCNC: 2.8 GM/DL
GLUCOSE SERPL-MCNC: 96 MG/DL (ref 65–99)
GLUCOSE UR STRIP-MCNC: NEGATIVE MG/DL
HCT VFR BLD AUTO: 39.8 % (ref 34–46.6)
HGB BLD-MCNC: 13.3 G/DL (ref 12–15.9)
HGB UR QL STRIP.AUTO: NEGATIVE
IMM GRANULOCYTES # BLD AUTO: 0.03 10*3/MM3 (ref 0–0.05)
IMM GRANULOCYTES NFR BLD AUTO: 0.4 % (ref 0–0.5)
KETONES UR QL STRIP: NEGATIVE
LEUKOCYTE ESTERASE UR QL STRIP.AUTO: NEGATIVE
LYMPHOCYTES # BLD AUTO: 2.67 10*3/MM3 (ref 0.7–3.1)
LYMPHOCYTES NFR BLD AUTO: 35.5 % (ref 19.6–45.3)
MCH RBC QN AUTO: 31.1 PG (ref 26.6–33)
MCHC RBC AUTO-ENTMCNC: 33.4 G/DL (ref 31.5–35.7)
MCV RBC AUTO: 93.2 FL (ref 79–97)
MONOCYTES # BLD AUTO: 0.52 10*3/MM3 (ref 0.1–0.9)
MONOCYTES NFR BLD AUTO: 6.9 % (ref 5–12)
NEUTROPHILS NFR BLD AUTO: 4.09 10*3/MM3 (ref 1.7–7)
NEUTROPHILS NFR BLD AUTO: 54.4 % (ref 42.7–76)
NITRITE UR QL STRIP: NEGATIVE
NRBC BLD AUTO-RTO: 0 /100 WBC (ref 0–0.2)
PH UR STRIP.AUTO: 6 [PH] (ref 5–8)
PLATELET # BLD AUTO: 215 10*3/MM3 (ref 140–450)
PMV BLD AUTO: 10.2 FL (ref 6–12)
POTASSIUM SERPL-SCNC: 3.6 MMOL/L (ref 3.5–5.2)
PROT SERPL-MCNC: 6.7 G/DL (ref 6–8.5)
PROT UR QL STRIP: NEGATIVE
RBC # BLD AUTO: 4.27 10*6/MM3 (ref 3.77–5.28)
SODIUM SERPL-SCNC: 142 MMOL/L (ref 136–145)
SP GR UR STRIP: 1.02 (ref 1–1.03)
UROBILINOGEN UR QL STRIP: NORMAL
WBC NRBC COR # BLD AUTO: 7.52 10*3/MM3 (ref 3.4–10.8)

## 2025-07-24 PROCEDURE — 72146 MRI CHEST SPINE W/O DYE: CPT

## 2025-07-24 PROCEDURE — 72148 MRI LUMBAR SPINE W/O DYE: CPT

## 2025-07-24 PROCEDURE — 80053 COMPREHEN METABOLIC PANEL: CPT | Performed by: NURSE PRACTITIONER

## 2025-07-24 PROCEDURE — 96374 THER/PROPH/DIAG INJ IV PUSH: CPT

## 2025-07-24 PROCEDURE — 85025 COMPLETE CBC W/AUTO DIFF WBC: CPT | Performed by: NURSE PRACTITIONER

## 2025-07-24 PROCEDURE — 25010000002 DEXAMETHASONE PER 1 MG: Performed by: NURSE PRACTITIONER

## 2025-07-24 PROCEDURE — 81003 URINALYSIS AUTO W/O SCOPE: CPT | Performed by: NURSE PRACTITIONER

## 2025-07-24 PROCEDURE — 36415 COLL VENOUS BLD VENIPUNCTURE: CPT

## 2025-07-24 PROCEDURE — 99284 EMERGENCY DEPT VISIT MOD MDM: CPT

## 2025-07-24 RX ORDER — CLONAZEPAM 1 MG/1
0.5 TABLET ORAL 2 TIMES DAILY
COMMUNITY

## 2025-07-24 RX ORDER — METHOCARBAMOL 750 MG/1
TABLET, FILM COATED ORAL
COMMUNITY

## 2025-07-24 RX ORDER — ATENOLOL 25 MG/1
25 TABLET ORAL EVERY 8 HOURS
COMMUNITY

## 2025-07-24 RX ORDER — TRAMADOL HYDROCHLORIDE 100 MG/1
TABLET, COATED ORAL
COMMUNITY

## 2025-07-24 RX ORDER — SODIUM CHLORIDE 0.9 % (FLUSH) 0.9 %
10 SYRINGE (ML) INJECTION AS NEEDED
Status: DISCONTINUED | OUTPATIENT
Start: 2025-07-24 | End: 2025-07-24 | Stop reason: HOSPADM

## 2025-07-24 RX ORDER — DEXAMETHASONE SODIUM PHOSPHATE 10 MG/ML
10 INJECTION, SOLUTION INTRA-ARTICULAR; INTRALESIONAL; INTRAMUSCULAR; INTRAVENOUS; SOFT TISSUE ONCE
Status: COMPLETED | OUTPATIENT
Start: 2025-07-24 | End: 2025-07-24

## 2025-07-24 RX ADMIN — DEXAMETHASONE SODIUM PHOSPHATE 10 MG: 10 INJECTION INTRAMUSCULAR; INTRAVENOUS at 16:06

## 2025-07-24 NOTE — ED PROVIDER NOTES
Subjective   History of Present Illness  Patient is a 59-year-old female who presents to the emergency department with complaints of back pain.  Patient states approximately 6 weeks ago she fell injuring her back.  She states that she was picking up a heavy box and slipped and fell back into a metal shelf.  She states that she hit her back against the shelf and landed on her buttock.  Since this time she has had what is described as a progression of pain which also includes urinary incontinence, tingling and numbness to her legs and feet bilaterally.  She was evaluated by her PCP and had an x-ray of her thoracic spine which shows dextroscoliosis, mild degenerative changes, thorax is evidence of previous ACDF.  She states that she had another episode of urinary incontinence today.  Due to back pain she came to the emergency department for evaluation and treatment.  Past medical history significant for anxiety, arthritis, chronic pain syndrome, fibromyalgia, hyperlipidemia, hypertension, migraines, multiple thyroid nodules, osteoporosis, thoracic spondylosis        Review of Systems   Constitutional: Negative.    HENT: Negative.     Respiratory: Negative.     Cardiovascular: Negative.    Gastrointestinal: Negative.    Genitourinary:         Reports loss of bladder control   Musculoskeletal:  Positive for back pain.   Neurological:  Positive for numbness.   All other systems reviewed and are negative.      Past Medical History:   Diagnosis Date    Anxiety     Arthritis     Chronic pain syndrome     COVID-19 vaccine series started     [employer]    Fibromyalgia     Hyperlipidemia     Hypertension     Migraines     Multiple thyroid nodules     hx multinodular goiter    Osteoporosis     Palpitation     neg work up    Thoracic spondylosis disc disease       Allergies   Allergen Reactions    Sulfa Antibiotics Rash       Past Surgical History:   Procedure Laterality Date    ANTERIOR CERVICAL FUSION  2010    C4-5, C5-6 with   [facility]     SECTION      CHOLECYSTECTOMY      COLONOSCOPY  2014    erythematous mucosa sigmoid, descending, and transverse colon, benign, externial hemorrhoids, Dr. López    ENDOSCOPY  2014    Gastritis, normal esophagus dilated 48 Fr, Dr. López       Family History   Problem Relation Age of Onset    Hypertension Mother     Rheum arthritis Mother     Arthritis Father     Cancer Father     Heart disease Father     Hypertension Father        Social History     Socioeconomic History    Marital status:    Tobacco Use    Smoking status: Every Day     Current packs/day: 1.00     Average packs/day: 1 pack/day for 43.6 years (43.6 ttl pk-yrs)     Types: Cigarettes     Start date:     Smokeless tobacco: Never   Vaping Use    Vaping status: Former    Quit date: 2020   Substance and Sexual Activity    Alcohol use: No    Drug use: No    Sexual activity: Defer           Objective   Physical Exam  Vitals and nursing note reviewed.   Constitutional:       Appearance: Normal appearance. She is well-developed.   HENT:      Head: Normocephalic and atraumatic.      Right Ear: External ear normal.      Left Ear: External ear normal.      Nose: Nose normal.      Mouth/Throat:      Pharynx: Oropharynx is clear.   Eyes:      Extraocular Movements: Extraocular movements intact.      Conjunctiva/sclera: Conjunctivae normal.   Cardiovascular:      Rate and Rhythm: Normal rate and regular rhythm.      Heart sounds: Normal heart sounds.   Pulmonary:      Effort: Pulmonary effort is normal.      Breath sounds: Normal breath sounds.   Abdominal:      General: Bowel sounds are normal.      Palpations: Abdomen is soft.   Musculoskeletal:      Cervical back: Normal range of motion and neck supple.      Comments: Pain to palpation to the thoracic and lumbar spine, pinpoint tenderness noted to the thoracic spine, radiating pain down legs bilaterally with what is described as numbness and tingling to legs  bilaterally, no obvious weakness noted, reports urinary incontinence   Skin:     General: Skin is warm and dry.   Neurological:      Mental Status: She is alert and oriented to person, place, and time.   Psychiatric:         Mood and Affect: Mood normal.         Behavior: Behavior normal.         Thought Content: Thought content normal.         Judgment: Judgment normal.         Procedures           ED Course  ED Course as of 07/24/25 2046 Thu Jul 24, 2025 2009 Work up remains pending. This will be a turn over for Clem ROMANO [TW]      ED Course User Index  [TW] Bridget Hart APRN                                           Urinalysis is negative, CMP negative, CBC negative    Extraspinal findings: No suspicious findings.     MRI thoracic spine IMPRESSION:     1. No fracture, abnormal cord signal, or significant spinal canal  narrowing.  2. Multilevel spondylotic changes; most notable with right foraminal  narrowing spanning the T1-T2 through T5-T6 levels.     MRI L-spine IMPRESSION:  1. No acute fracture or significant spinal canal narrowing.  2. Multilevel spondylotic changes with type I Modic changes.  3. Moderate right L4-L5 and L5-S1 foraminal narrowing.    Workup is essentially negative, will have patient follow-up with her primary care provider for reevaluation.          Medical Decision Making  Patient is a 59-year-old female who presents to the emergency department with complaints of back pain.  Patient states approximately 6 weeks ago she fell injuring her back.  She states that she was picking up a heavy box and slipped and fell back into a metal shelf.  She states that she hit her back against the shelf and landed on her buttock.  Since this time she has had what is described as a progression of pain which also includes urinary incontinence, tingling and numbness to her legs and feet bilaterally.  She was evaluated by her PCP and had an x-ray of her thoracic spine which shows  dextroscoliosis, mild degenerative changes, thorax is evidence of previous ACDF.  She states that she had another episode of urinary incontinence today.  Due to back pain she came to the emergency department for evaluation and treatment.  Past medical history significant for anxiety, arthritis, chronic pain syndrome, fibromyalgia, hyperlipidemia, hypertension, migraines, multiple thyroid nodules, osteoporosis, thoracic spondylosis    Differential diagnosis includes but not limited to spinous fracture, contusion, spinal cord impingement, bulging disc, and other etiologies    Problems Addressed:  Acute midline back pain, unspecified back location: complicated acute illness or injury    Amount and/or Complexity of Data Reviewed  Labs: ordered.  Radiology: ordered.    Risk  Prescription drug management.        Final diagnoses:   Acute midline back pain, unspecified back location       ED Disposition  ED Disposition       ED Disposition   Discharge    Condition   Stable    Comment   --               Jose Luis Steve, DO  [address] [place] [id number] [phone]    Call   Call to schedule follow-up appointment         Medication List      No changes were made to your prescriptions during this visit.            Clem Chiu PA-C  07/24/25 [id number]

## 2025-07-25 NOTE — DISCHARGE INSTRUCTIONS
The MRI of the lumbar and thoracic spines were negative.  The urinalysis was also negative.  Recommend follow-up with primary care provider for reevaluation.

## 2025-07-28 DIAGNOSIS — M45.9 RHEUMATOID ARTHRITIS INVOLVING VERTEBRA WITH POSITIVE RHEUMATOID FACTOR (HCC): ICD-10-CM

## 2025-07-28 RX ORDER — TRAMADOL HYDROCHLORIDE 100 MG/1
100 TABLET, COATED ORAL EVERY 6 HOURS PRN
Qty: 120 TABLET | Refills: 0 | Status: SHIPPED | OUTPATIENT
Start: 2025-08-11 | End: 2025-09-10

## 2025-07-28 NOTE — TELEPHONE ENCOUNTER
Last seen in office visit: 07/07/25  Next scheduled office visit: 10/01/25  Last UDS results: 03/12/25  Any discrepancies on [facility] (such as refills from another provider): no

## 2025-08-04 DIAGNOSIS — M62.830 MUSCLE SPASM OF BACK: ICD-10-CM

## 2025-08-04 RX ORDER — METHOCARBAMOL 750 MG/1
750-1500 TABLET, FILM COATED ORAL 4 TIMES DAILY PRN
Qty: 180 TABLET | Refills: 2 | Status: SHIPPED | OUTPATIENT
Start: 2025-08-04 | End: 2025-11-02

## 2025-08-28 DIAGNOSIS — M45.9 RHEUMATOID ARTHRITIS INVOLVING VERTEBRA WITH POSITIVE RHEUMATOID FACTOR (HCC): ICD-10-CM

## 2025-08-28 RX ORDER — TRAMADOL HYDROCHLORIDE 100 MG/1
100 TABLET, COATED ORAL EVERY 6 HOURS PRN
Qty: 120 TABLET | Refills: 0 | Status: SHIPPED | OUTPATIENT
Start: 2025-09-11 | End: 2025-10-11